# Patient Record
Sex: MALE | Race: WHITE | NOT HISPANIC OR LATINO | Employment: OTHER | URBAN - METROPOLITAN AREA
[De-identification: names, ages, dates, MRNs, and addresses within clinical notes are randomized per-mention and may not be internally consistent; named-entity substitution may affect disease eponyms.]

---

## 2017-02-09 ENCOUNTER — HOSPITAL ENCOUNTER (OUTPATIENT)
Dept: RADIOLOGY | Facility: MEDICAL CENTER | Age: 82
End: 2017-02-09
Attending: INTERNAL MEDICINE
Payer: MEDICARE

## 2017-02-09 DIAGNOSIS — M51.36 DEGENERATION OF LUMBAR INTERVERTEBRAL DISC: ICD-10-CM

## 2017-02-09 PROCEDURE — 72110 X-RAY EXAM L-2 SPINE 4/>VWS: CPT

## 2017-05-04 ENCOUNTER — HOSPITAL ENCOUNTER (OUTPATIENT)
Dept: HOSPITAL 8 - CFH | Age: 82
Discharge: HOME | End: 2017-05-04
Attending: NURSE PRACTITIONER
Payer: MEDICARE

## 2017-05-04 DIAGNOSIS — M25.561: Primary | ICD-10-CM

## 2017-05-04 DIAGNOSIS — M25.562: ICD-10-CM

## 2018-06-18 ENCOUNTER — HOSPITAL ENCOUNTER (OUTPATIENT)
Dept: HOSPITAL 8 - CFH | Age: 83
Discharge: HOME | End: 2018-06-18
Attending: INTERNAL MEDICINE
Payer: COMMERCIAL

## 2018-06-18 DIAGNOSIS — G31.89: Primary | ICD-10-CM

## 2018-06-18 PROCEDURE — 70450 CT HEAD/BRAIN W/O DYE: CPT

## 2019-08-27 ENCOUNTER — APPOINTMENT (RX ONLY)
Dept: URBAN - METROPOLITAN AREA CLINIC 4 | Facility: CLINIC | Age: 84
Setting detail: DERMATOLOGY
End: 2019-08-27

## 2019-08-27 DIAGNOSIS — L81.4 OTHER MELANIN HYPERPIGMENTATION: ICD-10-CM

## 2019-08-27 DIAGNOSIS — D22 MELANOCYTIC NEVI: ICD-10-CM

## 2019-08-27 DIAGNOSIS — L82.1 OTHER SEBORRHEIC KERATOSIS: ICD-10-CM

## 2019-08-27 DIAGNOSIS — D18.0 HEMANGIOMA: ICD-10-CM

## 2019-08-27 DIAGNOSIS — D485 NEOPLASM OF UNCERTAIN BEHAVIOR OF SKIN: ICD-10-CM

## 2019-08-27 DIAGNOSIS — L57.0 ACTINIC KERATOSIS: ICD-10-CM

## 2019-08-27 PROBLEM — D22.72 MELANOCYTIC NEVI OF LEFT LOWER LIMB, INCLUDING HIP: Status: ACTIVE | Noted: 2019-08-27

## 2019-08-27 PROBLEM — D22.62 MELANOCYTIC NEVI OF LEFT UPPER LIMB, INCLUDING SHOULDER: Status: ACTIVE | Noted: 2019-08-27

## 2019-08-27 PROBLEM — D48.5 NEOPLASM OF UNCERTAIN BEHAVIOR OF SKIN: Status: ACTIVE | Noted: 2019-08-27

## 2019-08-27 PROBLEM — D22.71 MELANOCYTIC NEVI OF RIGHT LOWER LIMB, INCLUDING HIP: Status: ACTIVE | Noted: 2019-08-27

## 2019-08-27 PROBLEM — D18.01 HEMANGIOMA OF SKIN AND SUBCUTANEOUS TISSUE: Status: ACTIVE | Noted: 2019-08-27

## 2019-08-27 PROBLEM — D22.5 MELANOCYTIC NEVI OF TRUNK: Status: ACTIVE | Noted: 2019-08-27

## 2019-08-27 PROBLEM — D22.61 MELANOCYTIC NEVI OF RIGHT UPPER LIMB, INCLUDING SHOULDER: Status: ACTIVE | Noted: 2019-08-27

## 2019-08-27 PROCEDURE — 11102 TANGNTL BX SKIN SINGLE LES: CPT

## 2019-08-27 PROCEDURE — 11103 TANGNTL BX SKIN EA SEP/ADDL: CPT

## 2019-08-27 PROCEDURE — ? COUNSELING

## 2019-08-27 PROCEDURE — 99213 OFFICE O/P EST LOW 20 MIN: CPT | Mod: 25

## 2019-08-27 PROCEDURE — ? BIOPSY BY SHAVE METHOD

## 2019-08-27 PROCEDURE — 17000 DESTRUCT PREMALG LESION: CPT | Mod: 59

## 2019-08-27 PROCEDURE — 17003 DESTRUCT PREMALG LES 2-14: CPT

## 2019-08-27 PROCEDURE — ? LIQUID NITROGEN

## 2019-08-27 PROCEDURE — ? SUNSCREEN RECOMMENDATIONS

## 2019-08-27 ASSESSMENT — LOCATION SIMPLE DESCRIPTION DERM
LOCATION SIMPLE: LEFT UPPER BACK
LOCATION SIMPLE: LEFT KNEE
LOCATION SIMPLE: RIGHT SCALP
LOCATION SIMPLE: RIGHT HAND
LOCATION SIMPLE: RIGHT LOWER BACK
LOCATION SIMPLE: RIGHT CHEEK
LOCATION SIMPLE: LEFT ZYGOMA
LOCATION SIMPLE: LEFT CHEEK
LOCATION SIMPLE: LEFT THIGH
LOCATION SIMPLE: LEFT FOREARM
LOCATION SIMPLE: UPPER BACK
LOCATION SIMPLE: SCALP
LOCATION SIMPLE: LEFT HAND
LOCATION SIMPLE: RIGHT FOREHEAD
LOCATION SIMPLE: RIGHT FOREARM
LOCATION SIMPLE: ABDOMEN
LOCATION SIMPLE: RIGHT THIGH
LOCATION SIMPLE: LEFT POSTERIOR UPPER ARM
LOCATION SIMPLE: LEFT FOREHEAD
LOCATION SIMPLE: LEFT ANTERIOR NECK
LOCATION SIMPLE: RIGHT POSTERIOR UPPER ARM

## 2019-08-27 ASSESSMENT — LOCATION ZONE DERM
LOCATION ZONE: LEG
LOCATION ZONE: HAND
LOCATION ZONE: ARM
LOCATION ZONE: FACE
LOCATION ZONE: NECK
LOCATION ZONE: SCALP
LOCATION ZONE: TRUNK

## 2019-08-27 ASSESSMENT — LOCATION DETAILED DESCRIPTION DERM
LOCATION DETAILED: LEFT INFERIOR FOREHEAD
LOCATION DETAILED: LEFT PROXIMAL DORSAL FOREARM
LOCATION DETAILED: RIGHT ANTERIOR PROXIMAL THIGH
LOCATION DETAILED: RIGHT DISTAL POSTERIOR UPPER ARM
LOCATION DETAILED: 2ND WEB SPACE LEFT HAND
LOCATION DETAILED: RIGHT INFERIOR MEDIAL MIDBACK
LOCATION DETAILED: LEFT ULNAR DORSAL HAND
LOCATION DETAILED: RIGHT CENTRAL MALAR CHEEK
LOCATION DETAILED: LEFT ANTERIOR PROXIMAL THIGH
LOCATION DETAILED: LEFT CENTRAL MALAR CHEEK
LOCATION DETAILED: RIGHT CENTRAL FRONTAL SCALP
LOCATION DETAILED: LEFT PROXIMAL POSTERIOR UPPER ARM
LOCATION DETAILED: RIGHT SUPERIOR MEDIAL MIDBACK
LOCATION DETAILED: PERIUMBILICAL SKIN
LOCATION DETAILED: LEFT CENTRAL ZYGOMA
LOCATION DETAILED: LEFT INFERIOR UPPER BACK
LOCATION DETAILED: SUPERIOR THORACIC SPINE
LOCATION DETAILED: LEFT KNEE
LOCATION DETAILED: LEFT SUPERIOR MEDIAL UPPER BACK
LOCATION DETAILED: LEFT CENTRAL PARIETAL SCALP
LOCATION DETAILED: RIGHT SUPERIOR MEDIAL FOREHEAD
LOCATION DETAILED: RIGHT RIB CAGE
LOCATION DETAILED: LEFT INFERIOR ANTERIOR NECK
LOCATION DETAILED: LEFT SUPERIOR CENTRAL MALAR CHEEK
LOCATION DETAILED: LEFT POSTERIOR LATERAL DISTAL THIGH
LOCATION DETAILED: RIGHT RADIAL DORSAL HAND
LOCATION DETAILED: RIGHT PROXIMAL DORSAL FOREARM

## 2019-08-27 NOTE — HPI: FULL BODY SKIN EXAMINATION
How Severe Are Your Spot(S)?: moderate
What Type Of Note Output Would You Prefer (Optional)?: Bullet Format
What Is The Reason For Today's Visit?: Skin Lesions
What Is The Reason For Today's Visit? (Being Monitored For X): the development of new lesions
Additional History: SAUMYA, has “warts” on his left knee and thigh he wants removed.

## 2019-08-27 NOTE — PROCEDURE: BIOPSY BY SHAVE METHOD
Silver Nitrate Text: The wound bed was treated with silver nitrate after the biopsy was performed.
Hemostasis: Drysol
Was A Bandage Applied: Yes
Bill 86174 For Specimen Handling/Conveyance To Laboratory?: no
Dressing: bandage
Electrodesiccation And Curettage Text: The wound bed was treated with electrodesiccation and curettage after the biopsy was performed.
Anesthesia Volume In Cc: 2
Biopsy Type: H and E
X Size Of Lesion In Cm: 0.2
Type Of Destruction Used: Curettage
Consent: Written consent was obtained and risks were reviewed including but not limited to scarring, infection, bleeding, scabbing, incomplete removal, nerve damage and allergy to anesthesia.
Electrodesiccation Text: The wound bed was treated with electrodesiccation after the biopsy was performed.
Depth Of Biopsy: dermis
Notification Instructions: Patient will be notified of biopsy results. However, patient instructed to call the office if not contacted within 2 weeks.
Lab Facility: 
Wound Care: Vaseline
Additional Anesthesia Volume In Cc (Will Not Render If 0): 0
Cryotherapy Text: The wound bed was treated with cryotherapy after the biopsy was performed.
Anesthesia Type: 1% lidocaine with epinephrine
Post-Care Instructions: I reviewed with the patient in detail post-care instructions. Patient is to keep the biopsy site dry overnight, and then apply bacitracin twice daily until healed. Patient may apply hydrogen peroxide soaks to remove any crusting.
Lab: [id number]
Curettage Text: The wound bed was treated with curettage after the biopsy was performed.
Detail Level: Detailed
Billing Type: Third-Party Bill
Biopsy Method: Personna blade
Size Of Lesion In Cm: 1.2
Wound Care: Bacitracin
X Size Of Lesion In Cm: 1.3
Size Of Lesion In Cm: 2.1

## 2019-08-27 NOTE — PROCEDURE: LIQUID NITROGEN
Render Post-Care Instructions In Note?: no
Number Of Freeze-Thaw Cycles: 2 freeze-thaw cycles
Detail Level: Simple
Consent: The patient's consent was obtained including but not limited to risks of crusting, scabbing, blistering, scarring, darker or lighter pigmentary change, recurrence, incomplete removal and infection.
Post-Care Instructions: I reviewed with the patient in detail post-care instructions. Patient is to wear sunprotection, and avoid picking at any of the treated lesions. Pt may apply Vaseline to crusted or scabbing areas.
Duration Of Freeze Thaw-Cycle (Seconds): 3

## 2019-09-05 ENCOUNTER — APPOINTMENT (RX ONLY)
Dept: URBAN - METROPOLITAN AREA CLINIC 4 | Facility: CLINIC | Age: 84
Setting detail: DERMATOLOGY
End: 2019-09-05

## 2019-09-05 PROBLEM — C44.91 BASAL CELL CARCINOMA OF SKIN, UNSPECIFIED: Status: ACTIVE | Noted: 2019-09-05

## 2019-09-05 PROCEDURE — ? MOHS SURGERY PHONE CONSULTATION

## 2019-09-05 NOTE — PROCEDURE: MOHS SURGERY PHONE CONSULTATION
Does The Patient Take Antibiotics Prior To Dental Procedures?: No
Office Location Of Mohs Surgery: [facility]
Detail Level: Simple
Patient Preferred Phone Number: [phone]
Has The Pathology Report Been Received?: Yes
Which Antibiotic Do They Take For Surgical Prophylaxis?: Amoxicillin (2 grams)
Time Of Mohs Surgery: 08:30
If Yes- Details?: MI-2/3/19
Referring Provider: pawel
Patient's Insurance: 
Patient Reported Location: right superior medial forehead
If Yes- What Blood Thinners Do They Take?: asa 81mg
Date Of Mohs Surgery: 09/30/2019
Pathology Accession #: [id number]

## 2019-09-30 ENCOUNTER — APPOINTMENT (RX ONLY)
Dept: URBAN - METROPOLITAN AREA CLINIC 36 | Facility: CLINIC | Age: 84
Setting detail: DERMATOLOGY
End: 2019-09-30

## 2019-09-30 PROBLEM — C44.319 BASAL CELL CARCINOMA OF SKIN OF OTHER PARTS OF FACE: Status: ACTIVE | Noted: 2019-09-30

## 2019-09-30 PROCEDURE — ? MOHS SURGERY

## 2019-09-30 PROCEDURE — 13132 CMPLX RPR F/C/C/M/N/AX/G/H/F: CPT

## 2019-09-30 PROCEDURE — 17311 MOHS 1 STAGE H/N/HF/G: CPT

## 2019-09-30 NOTE — PROCEDURE: MOHS SURGERY
Surgical Defect Length In Cm (Optional): 1.1
Stage 13: Additional Anesthesia Type: 1% lidocaine with epinephrine
Quadrants Reporting?: 0
Show Biopsy Photo Reviewed Variable: Yes
O-L Flap Text: The defect edges were debeveled with a #15 scalpel blade.  Given the location of the defect, shape of the defect and the proximity to free margins an O-L flap was deemed most appropriate.  Using a sterile surgical marker, an appropriate advancement flap was drawn incorporating the defect and placing the expected incisions within the relaxed skin tension lines where possible.    The area thus outlined was incised deep to adipose tissue with a #15 scalpel blade.  The skin margins were undermined to an appropriate distance in all directions utilizing iris scissors.
Donor Site Anesthesia Type: same as repair anesthesia
W Plasty Text: The lesion was extirpated to the level of the fat with a #15 scalpel blade.  Given the location of the defect, shape of the defect and the proximity to free margins a W-plasty was deemed most appropriate for repair.  Using a sterile surgical marker, the appropriate transposition arms of the W-plasty were drawn incorporating the defect and placing the expected incisions within the relaxed skin tension lines where possible.    The area thus outlined was incised deep to adipose tissue with a #15 scalpel blade.  The skin margins were undermined to an appropriate distance in all directions utilizing iris scissors.  The opposing transposition arms were then transposed into place in opposite direction and anchored with interrupted buried subcutaneous sutures.
Repair Type: Complex Repair
Consent 1/Introductory Paragraph: The rationale for Mohs was explained to the patient and consent was obtained. The risks, benefits and alternatives to therapy were discussed in detail. Specifically, the risks of infection, scarring, bleeding, prolonged wound healing, incomplete removal, allergy to anesthesia, nerve injury and recurrence were addressed. Prior to the procedure, the treatment site was clearly identified and confirmed by the patient. All components of Universal Protocol/PAUSE Rule completed.
Plastic Surgeon Procedure Text (A): After obtaining clear surgical margins the patient was sent to plastics for surgical repair.  The patient understands they will receive post-surgical care and follow-up from the referring physician's office.
Area H Indication Text: Tumors in this location are included in Area H (eyelids, eyebrows, nose, lips, chin, ear, pre-auricular, post-auricular, temple, genitalia, hands, feet, ankles and areola).  Tissue conservation is critical in these anatomic locations.
Use A Different Wound Care And Dressing When There Are No Sutures?: No
Non-Graft Cartilage Fenestration Text: The cartilage was fenestrated with a 2mm punch biopsy to help facilitate healing.
Banner Transposition Flap Text: The defect edges were debeveled with a #15 scalpel blade.  Given the location of the defect and the proximity to free margins a Banner transposition flap was deemed most appropriate.  Using a sterile surgical marker, an appropriate flap drawn around the defect. The area thus outlined was incised deep to adipose tissue with a #15 scalpel blade.  The skin margins were undermined to an appropriate distance in all directions utilizing iris scissors.
Xenograft Text: The defect edges were debeveled with a #15 scalpel blade.  Given the location of the defect, shape of the defect and the proximity to free margins a xenograft was deemed most appropriate.  The graft was then trimmed to fit the size of the defect.  The graft was then placed in the primary defect and oriented appropriately.
Closure 3 Information: This tab is for additional flaps and grafts above and beyond our usual structured repairs.  Please note if you enter information here it will not currently bill and you will need to add the billing information manually.
Mohs Method Verbiage: An incision at a 45 degree angle following the standard Mohs approach was done and the specimen was harvested as a microscopic controlled layer.
Advancement-Rotation Flap Text: The defect edges were debeveled with a #15 scalpel blade.  Given the location of the defect, shape of the defect and the proximity to free margins an advancement-rotation flap was deemed most appropriate.  Using a sterile surgical marker, an appropriate flap was drawn incorporating the defect and placing the expected incisions within the relaxed skin tension lines where possible. The area thus outlined was incised deep to adipose tissue with a #15 scalpel blade.  The skin margins were undermined to an appropriate distance in all directions utilizing iris scissors.
Interpolation Flap Text: A decision was made to reconstruct the defect utilizing an interpolation axial flap and a staged reconstruction.  A telfa template was made of the defect.  This telfa template was then used to outline the interpolation flap.  The donor area for the pedicle flap was then injected with anesthesia.  The flap was excised through the skin and subcutaneous tissue down to the layer of the underlying musculature.  The interpolation flap was carefully excised within this deep plane to maintain its blood supply.  The edges of the donor site were undermined.   The donor site was closed in a primary fashion.  The pedicle was then rotated into position and sutured.  Once the tube was sutured into place, adequate blood supply was confirmed with blanching and refill.  The pedicle was then wrapped with xeroform gauze and dressed appropriately with a telfa and gauze bandage to ensure continued blood supply and protect the attached pedicle.
Oculoplastic Surgeon (A): Lamberto
Complex Repair And Flap Additional Text (Will Appearing After The Standard Complex Repair Text): The complex repair was not sufficient to completely close the primary defect. The remaining additional defect was repaired with the flap mentioned below.
Consent (Marginal Mandibular)/Introductory Paragraph: The rationale for Mohs was explained to the patient and consent was obtained. The risks, benefits and alternatives to therapy were discussed in detail. Specifically, the risks of damage to the marginal mandibular branch of the facial nerve, infection, scarring, bleeding, prolonged wound healing, incomplete removal, allergy to anesthesia, and recurrence were addressed. Prior to the procedure, the treatment site was clearly identified and confirmed by the patient. All components of Universal Protocol/PAUSE Rule completed.
Asc Procedure Text (A): After obtaining clear surgical margins the patient was sent to an ASC for surgical repair.  The patient understands they will receive post-surgical care and follow-up from the ASC physician.
Referred To Oculoplastics For Closure Text (Leave Blank If You Do Not Want): After obtaining clear surgical margins the patient was sent to oculoplastics for surgical repair.  The patient understands they will receive post-surgical care and follow-up from the referring physician's office.
Referring Physician (Optional): AMBROSE Garcia
Dorsal Nasal Flap Text: The defect edges were debeveled with a #15 scalpel blade.  Given the location of the defect and the proximity to free margins a dorsal nasal flap,based upon the glabellar folds, was deemed most appropriate.  Using a sterile surgical marker, an appropriate dorsal nasal flap was drawn around the defect.    The area thus outlined was incised deep to adipose tissue with a #15 scalpel blade.  The skin margins were undermined to an appropriate distance in all directions utilizing iris scissors.
Otolaryngologist Procedure Text (B): After obtaining clear surgical margins the patient was sent to otolaryngology for surgical repair.  The patient understands they will receive post-surgical care and follow-up from the referring physician's office.
Partial Purse String (Intermediate) Text: Given the location of the defect and the characteristics of the surrounding skin an intermediate purse string closure was deemed most appropriate.  Undermining was performed circumfirentially around the surgical defect.  A purse string suture was then placed and tightened. Wound tension only allowed a partial closure of the circular defect.
Hatchet Flap Text: The defect edges were debeveled with a #15 scalpel blade.  Given the location of the defect, shape of the defect and the proximity to free margins a hatchet flap based from the glabella was deemed most appropriate.  Using a sterile surgical marker, an appropriate glabellar hatchet flap was drawn incorporating the defect and placing the expected incisions within the relaxed skin tension lines where possible.    The area thus outlined was incised deep to adipose tissue with a #15 scalpel blade.  The skin margins were undermined to an appropriate distance in all directions utilizing iris scissors.
Paramedian Forehead Flap Text: A decision was made to reconstruct the defect utilizing an interpolation axial flap and a staged reconstruction.  A telfa template was made of the defect.  This telfa template was then used to outline the paramedian forehead pedicle flap.  The donor area for the pedicle flap was then injected with anesthesia.  The flap was excised through the skin and subcutaneous tissue down to the layer of the underlying musculature.  The pedicle flap was carefully excised within this deep plane to maintain its blood supply.  The edges of the donor site were undermined.   The donor site was closed in a primary fashion.  The pedicle was then rotated into position and sutured.  Once the tube was sutured into place, adequate blood supply was confirmed with blanching and refill.  The pedicle was then wrapped with xeroform gauze and dressed appropriately with a telfa and gauze bandage to ensure continued blood supply and protect the attached pedicle.
Unique Flap 3 Name: Mercedes Flap
Consent (Nose)/Introductory Paragraph: The rationale for Mohs was explained to the patient and consent was obtained. The risks, benefits and alternatives to therapy were discussed in detail. Specifically, the risks of nasal deformity, changes in the flow of air through the nose, infection, scarring, bleeding, prolonged wound healing, incomplete removal, allergy to anesthesia, nerve injury and recurrence were addressed. Prior to the procedure, the treatment site was clearly identified and confirmed by the patient. All components of Universal Protocol/PAUSE Rule completed.
Cheiloplasty (Less Than 50%) Text: A decision was made to reconstruct the defect with a  cheiloplasty.  The defect was undermined extensively.  Additional obicularis oris muscle was excised with a 15 blade scalpel.  The defect was converted into a full thickness wedge, of less than 50% of the vertical height of the lip, to facilite a better cosmetic result.  Small vessels were then tied off with 5-0 monocyrl. The obicularis oris, superficial fascia, adipose and dermis were then reapproximated.  After the deeper layers were approximated the epidermis was reapproximated with particular care given to realign the vermilion border.
Subsequent Stages Histo Method Verbiage: Using a similar technique to that described above, a thin layer of tissue was removed from all areas where tumor was visible on the previous stage.  The tissue was again oriented, mapped, dyed, and processed as above.
Unique Flap 4 Name: Banner Flap
Consent (Lip)/Introductory Paragraph: The rationale for Mohs was explained to the patient and consent was obtained. The risks, benefits and alternatives to therapy were discussed in detail. Specifically, the risks of lip deformity, changes in the oral aperture, infection, scarring, bleeding, prolonged wound healing, incomplete removal, allergy to anesthesia, nerve injury and recurrence were addressed. Prior to the procedure, the treatment site was clearly identified and confirmed by the patient. All components of Universal Protocol/PAUSE Rule completed.
Island Pedicle Flap-Requiring Vessel Identification Text: The defect edges were debeveled with a #15 scalpel blade.  Given the location of the defect, shape of the defect and the proximity to free margins an island pedicle advancement flap was deemed most appropriate.  Using a sterile surgical marker, an appropriate advancement flap was drawn, based on the axial vessel mentioned above, incorporating the defect, outlining the appropriate donor tissue and placing the expected incisions within the relaxed skin tension lines where possible.    The area thus outlined was incised deep to adipose tissue with a #15 scalpel blade.  The skin margins were undermined to an appropriate distance in all directions around the primary defect and laterally outward around the island pedicle utilizing iris scissors.  There was minimal undermining beneath the pedicle flap.
Epidermal Closure: running cuticular
Stage 3: Additional Anesthesia Type: 1% lidocaine with 1:100,000 epinephrine and 408mcg clindamycin/ml and a 1:10 solution of 8.4% sodium bicarbonate
Repair Performed By Another Provider Text (Leave Blank If You Do Not Want): After obtaining clear surgical margins the defect was repaired by another provider.
Graft Donor Site Epidermal Sutures (Optional): 5-0 Ethibond
Crescentic Complex Repair Preamble Text (Leave Blank If You Do Not Want): Extensive wide undermining was performed at least 2 cm in all directions.
Muscle Hinge Flap Text: The defect edges were debeveled with a #15 scalpel blade.  Given the size, depth and location of the defect and the proximity to free margins a muscle hinge flap was deemed most appropriate.  Using a sterile surgical marker, an appropriate hinge flap was drawn incorporating the defect. The area thus outlined was incised with a #15 scalpel blade.  The skin margins were undermined to an appropriate distance in all directions utilizing iris scissors.
Closure 2 Information: This tab is for additional flaps and grafts, including complex repair and grafts and complex repair and flaps. You can also specify a different location for the additional defect, if the location is the same you do not need to select a new one. We will insert the automated text for the repair you select below just as we do for solitary flaps and grafts. Please note that at this time if you select a location with a different insurance zone you will need to override the ICD10 and CPT if appropriate.
Ftsg Text: The defect edges were debeveled with a #15 scalpel blade.  Given the location of the defect, shape of the defect and the proximity to free margins a full thickness skin graft was deemed most appropriate.  Using a sterile surgical marker, the primary defect shape was transferred to the donor site. The area thus outlined was incised deep to adipose tissue with a #15 scalpel blade.  The harvested graft was then trimmed of adipose tissue until only dermis and epidermis was left.  The skin margins of the secondary defect were undermined to an appropriate distance in all directions utilizing iris scissors.  The secondary defect was closed with interrupted buried subcutaneous sutures.  The skin edges were then re-apposed with running  sutures.  The skin graft was then placed in the primary defect and oriented appropriately.
Unique Flap 4 Text: The defect edges were debeveled with a #15 scalpel blade.  Given the location of the defect and the proximity to free margins a Banner transposition flap was deemed most appropriate.  Using a sterile surgical marker, an appropriate Banner transposition flap was drawn incorporating the defect.    The area thus outlined was incised deep to adipose tissue with a #15 scalpel blade.  The skin margins were undermined to an appropriate distance in all directions utilizing iris scissors.
Chonodrocutaneous Helical Advancement Flap Text: The defect edges were debeveled with a #15 scalpel blade.  Given the location of the defect and the proximity to free margins a chondrocutaneous helical advancement flap was deemed most appropriate.  Using a sterile surgical marker, the appropriate advancement flap was drawn incorporating the defect and placing the expected incisions within the relaxed skin tension lines where possible.    The area thus outlined was incised deep to adipose tissue with a #15 scalpel blade.  The skin margins were undermined to an appropriate distance in all directions utilizing iris scissors.
Graft Donor Site Bandage (Optional-Leave Blank If You Don't Want In Note): Aquaphor and telefa placed on wound. Pressure dressing applied to donor site
X Size Of Lesion In Cm (Optional): 0.5
Double O-Z Plasty Text: The defect edges were debeveled with a #15 scalpel blade.  Given the location of the defect, shape of the defect and the proximity to free margins a Double O-Z plasty (double transposition flap) was deemed most appropriate.  Using a sterile surgical marker, the appropriate transposition flaps were drawn incorporating the defect and placing the expected incisions within the relaxed skin tension lines where possible. The area thus outlined was incised deep to adipose tissue with a #15 scalpel blade.  The skin margins were undermined to an appropriate distance in all directions utilizing iris scissors.  Hemostasis was achieved with electrocautery.  The flaps were then transposed into place, one clockwise and the other counterclockwise, and anchored with interrupted buried subcutaneous sutures.
Additional Anesthesia Volume In Cc: 6
Wound Care: Aquaphor
Crescentic Intermediate Repair Preamble Text (Leave Blank If You Do Not Want): Undermining was performed with blunt dissection.
Bi-Rhombic Flap Text: The defect edges were debeveled with a #15 scalpel blade.  Given the location of the defect and the proximity to free margins a bi-rhombic flap was deemed most appropriate.  Using a sterile surgical marker, an appropriate rhombic flap was drawn incorporating the defect. The area thus outlined was incised deep to adipose tissue with a #15 scalpel blade.  The skin margins were undermined to an appropriate distance in all directions utilizing iris scissors.
Epidermal Autograft Text: The defect edges were debeveled with a #15 scalpel blade.  Given the location of the defect, shape of the defect and the proximity to free margins an epidermal autograft was deemed most appropriate.  Using a sterile surgical marker, the primary defect shape was transferred to the donor site. The epidermal graft was then harvested.  The skin graft was then placed in the primary defect and oriented appropriately.
Information: Selecting Yes will display possible errors in your note based on the variables you have selected. This validation is only offered as a suggestion for you. PLEASE NOTE THAT THE VALIDATION TEXT WILL BE REMOVED WHEN YOU FINALIZE YOUR NOTE. IF YOU WANT TO FAX A PRELIMINARY NOTE YOU WILL NEED TO TOGGLE THIS TO 'NO' IF YOU DO NOT WANT IT IN YOUR FAXED NOTE.
Mid-Level Procedure Text (D): After obtaining clear surgical margins the patient was sent to a mid-level provider for surgical repair.  The patient understands they will receive post-surgical care and follow-up from the mid-level provider.
Same Histology In Subsequent Stages Text: The pattern and morphology of the tumor is as described in the first stage.
Simple / Intermediate / Complex Repair - Final Wound Length In Cm: 3
Surgical Defect Width In Cm (Optional): 0.8
O-Z Flap Text: The defect edges were debeveled with a #15 scalpel blade.  Given the location of the defect, shape of the defect and the proximity to free margins an O-Z flap was deemed most appropriate.  Using a sterile surgical marker, an appropriate transposition flap was drawn incorporating the defect and placing the expected incisions within the relaxed skin tension lines where possible. The area thus outlined was incised deep to adipose tissue with a #15 scalpel blade.  The skin margins were undermined to an appropriate distance in all directions utilizing iris scissors.
Consent 2/Introductory Paragraph: Mohs surgery was explained to the patient and consent was obtained. The risks, benefits and alternatives to therapy were discussed in detail. Specifically, the risks of infection, scarring, bleeding, prolonged wound healing, incomplete removal, allergy to anesthesia, nerve injury and recurrence were addressed. Prior to the procedure, the treatment site was clearly identified and confirmed by the patient. All components of Universal Protocol/PAUSE Rule completed.
Z Plasty Text: The lesion was extirpated to the level of the fat with a #15 scalpel blade.  Given the location of the defect, shape of the defect and the proximity to free margins a Z-plasty was deemed most appropriate for repair.  Using a sterile surgical marker, the appropriate transposition arms of the Z-plasty were drawn incorporating the defect and placing the expected incisions within the relaxed skin tension lines where possible.    The area thus outlined was incised deep to adipose tissue with a #15 scalpel blade.  The skin margins were undermined to an appropriate distance in all directions utilizing iris scissors.  The opposing transposition arms were then transposed into place in opposite direction and anchored with interrupted buried subcutaneous sutures.
Wound Care (No Sutures): Petrolatum
Area M Indication Text: Tumors in this location are included in Area M (cheek, forehead, scalp, neck, jawline and pretibial skin).  Mohs surgery is indicated for tumors in these anatomic locations.
Graft Cartilage Fenestration Text: The cartilage was fenestrated with a 2mm punch biopsy to help facilitate graft survival and healing.
Previous Accession (Optional): [id number]
Bilobed Flap Text: The defect edges were debeveled with a #15 scalpel blade.  Given the location of the defect and the proximity to free margins a bilobe flap was deemed most appropriate.  Using a sterile surgical marker, an appropriate bilobe flap drawn around the defect.    The area thus outlined was incised deep to adipose tissue with a #15 scalpel blade.  The skin margins were undermined to an appropriate distance in all directions utilizing iris scissors.
Medical Necessity Statement: Based on my medical judgement, Mohs surgery is the most appropriate treatment for this cancer compared to other treatments.
Purse String (Simple) Text: Given the location of the defect and the characteristics of the surrounding skin a purse string closure was deemed most appropriate.  Undermining was performed circumfirentially around the surgical defect.  A purse string suture was then placed and tightened.
Pain Refusal Text: I offered to prescribe pain medication but the patient refused to take this medication.
Surgeon/Pathologist Verbiage (Will Incorporate Name Of Surgeon From Intro If Not Blank): operated in two distinct and integrated capacities as the surgeon and pathologist.
Mercedes Flap Text: The defect edges were debeveled with a #15 scalpel blade.  Given the location of the defect, shape of the defect and the proximity to free margins a Mercedes flap was deemed most appropriate.  Using a sterile surgical marker, an appropriate advancement flap was drawn incorporating the defect and placing the expected incisions within the relaxed skin tension lines where possible. The area thus outlined was incised deep to adipose tissue with a #15 scalpel blade.  The skin margins were undermined to an appropriate distance in all directions utilizing iris scissors.
Melolabial Interpolation Flap Text: A decision was made to reconstruct the defect utilizing an interpolation axial flap and a staged reconstruction.  A telfa template was made of the defect.  This telfa template was then used to outline the melolabial interpolation flap.  The donor area for the pedicle flap was then injected with anesthesia.  The flap was excised through the skin and subcutaneous tissue down to the layer of the underlying musculature.  The pedicle flap was carefully excised within this deep plane to maintain its blood supply.  The edges of the donor site were undermined.   The donor site was closed in a primary fashion.  The pedicle was then rotated into position and sutured.  Once the tube was sutured into place, adequate blood supply was confirmed with blanching and refill.  The pedicle was then wrapped with xeroform gauze and dressed appropriately with a telfa and gauze bandage to ensure continued blood supply and protect the attached pedicle.
Complex Repair And Graft Additional Text (Will Appearing After The Standard Complex Repair Text): The complex repair was not sufficient to completely close the primary defect. The remaining additional defect was repaired with the graft mentioned below.
Consent (Spinal Accessory)/Introductory Paragraph: The rationale for Mohs was explained to the patient and consent was obtained. The risks, benefits and alternatives to therapy were discussed in detail. Specifically, the risks of damage to the spinal accessory nerve, infection, scarring, bleeding, prolonged wound healing, incomplete removal, allergy to anesthesia, and recurrence were addressed. Prior to the procedure, the treatment site was clearly identified and confirmed by the patient. All components of Universal Protocol/PAUSE Rule completed.
Suture Removal: 7 days
Consent Type: Consent 1 (Standard)
Island Pedicle Flap Text: The defect edges were debeveled with a #15 scalpel blade.  Given the location of the defect, shape of the defect and the proximity to free margins an island pedicle advancement flap was deemed most appropriate.  Using a sterile surgical marker, an appropriate advancement flap was drawn incorporating the defect, outlining the appropriate donor tissue and placing the expected incisions within the relaxed skin tension lines where possible.    The area thus outlined was incised deep to adipose tissue with a #15 scalpel blade.  The skin margins were undermined to an appropriate distance in all directions around the primary defect and laterally outward around the island pedicle utilizing iris scissors.  There was minimal undermining beneath the pedicle flap.
Localized Dermabrasion With Wire Brush Text: The patient was draped in routine manner.  Localized dermabrasion using 3 x 17 mm wire brush was performed in routine manner to papillary dermis. This spot dermabrasion is being performed to complete skin cancer reconstruction. It also will eliminate the other sun damaged precancerous cells that are known to be part of the regional effect of a lifetime's worth of sun exposure. This localized dermabrasion is therapeutic and should not be considered cosmetic in any regard.
Bcc Histology Text: There were numerous aggregates of basaloid cells.
Rotation Flap Text: The defect edges were debeveled with a #15 scalpel blade.  Given the location of the defect, shape of the defect and the proximity to free margins a rotation flap was deemed most appropriate.  Using a sterile surgical marker, an appropriate rotation flap was drawn incorporating the defect and placing the expected incisions within the relaxed skin tension lines where possible.    The area thus outlined was incised deep to adipose tissue with a #15 scalpel blade.  The skin margins were undermined to an appropriate distance in all directions utilizing iris scissors.
Location Indication Override (Is Already Calculated Based On Selected Body Location): Area M
Spiral Flap Text: The defect edges were debeveled with a #15 scalpel blade.  Given the location of the defect, shape of the defect and the proximity to free margins a spiral flap was deemed most appropriate.  Using a sterile surgical marker, an appropriate rotation flap was drawn incorporating the defect and placing the expected incisions within the relaxed skin tension lines where possible. The area thus outlined was incised deep to adipose tissue with a #15 scalpel blade.  The skin margins were undermined to an appropriate distance in all directions utilizing iris scissors.
Cheiloplasty (Complex) Text: A decision was made to reconstruct the defect with a  cheiloplasty.  The defect was undermined extensively.  Additional obicularis oris muscle was excised with a 15 blade scalpel.  The defect was converted into a full thickness wedge to facilite a better cosmetic result.  Small vessels were then tied off with 5-0 monocyrl. The obicularis oris, superficial fascia, adipose and dermis were then reapproximated.  After the deeper layers were approximated the epidermis was reapproximated with particular care given to realign the vermilion border.
Mohs Rapid Report Verbiage: The area of clinically evident tumor was marked with skin marking ink and appropriately hatched.  The initial incision was made following the Mohs approach through the skin.  The specimen was taken to the lab, divided into the necessary number of pieces, chromacoded and processed according to the Mohs protocol.  This was repeated in successive stages until a tumor free defect was achieved.
Consent (Scalp)/Introductory Paragraph: The rationale for Mohs was explained to the patient and consent was obtained. The risks, benefits and alternatives to therapy were discussed in detail. Specifically, the risks of changes in hair growth pattern secondary to repair, infection, scarring, bleeding, prolonged wound healing, incomplete removal, allergy to anesthesia, nerve injury and recurrence were addressed. Prior to the procedure, the treatment site was clearly identified and confirmed by the patient. All components of Universal Protocol/PAUSE Rule completed.
Unique Flap 1 Text: A decision was made to reconstruct the defect utilizing a myocutaneous Island pedicle Flap based on the levator labii superioris muscle.  A telfa template was made of the defect.  This telfa template was then used to outline the myocutaneous flap, based along the meilolabial fold.  The donor area for the pedicle flap was then injected with anesthesia.  The flap was excised through the skin and subcutaneous tissue down to the layer of the underlying musculature.  The myocutaneous flap was carefully excised within this deep plane to maintain its blood supply. Based on the muscle. The edges of the donor site were undermined.   The donor site was closed in a primary fashion to the point of transposition.  The pedicle was then transposed into position and sutured.  Once the flap was sutured into place, adequate blood supply was confirmed with blanching and refill.
Keystone Flap Text: The defect edges were debeveled with a #15 scalpel blade.  Given the location of the defect, shape of the defect a keystone flap was deemed most appropriate.  Using a sterile surgical marker, an appropriate keystone flap was drawn incorporating the defect, outlining the appropriate donor tissue and placing the expected incisions within the relaxed skin tension lines where possible. The area thus outlined was incised deep to adipose tissue with a #15 scalpel blade.  The skin margins were undermined to an appropriate distance in all directions around the primary defect and laterally outward around the flap utilizing iris scissors.
Advancement Flap (Single) Text: The defect edges were debeveled with a #15 scalpel blade.  Given the location of the defect and the proximity to free margins a single advancement flap was deemed most appropriate.  Using a sterile surgical marker, an appropriate advancement flap was drawn incorporating the defect and placing the expected incisions within the relaxed skin tension lines where possible.    The area thus outlined was incised deep to adipose tissue with a #15 scalpel blade.  The skin margins were undermined to an appropriate distance in all directions utilizing iris scissors.
Anesthesia Volume In Cc: 2.5
Melolabial Transposition Flap Text: The defect edges were debeveled with a #15 scalpel blade.  Given the location of the defect and the proximity to free margins a melolabial flap was deemed most appropriate.  Using a sterile surgical marker, an appropriate melolabial transposition flap was drawn incorporating the defect.    The area thus outlined was incised deep to adipose tissue with a #15 scalpel blade.  The skin margins were undermined to an appropriate distance in all directions utilizing iris scissors.
Split-Thickness Skin Graft Text: The defect edges were debeveled with a #15 scalpel blade.  Given the location of the defect, shape of the defect and the proximity to free margins a split thickness skin graft was deemed most appropriate.  Using a sterile surgical marker, the primary defect shape was transferred to the donor site. The split thickness graft was then harvested.  The skin graft was then placed in the primary defect and oriented appropriately.
Manual Repair Warning Statement: We plan on removing the manually selected variable below in favor of our much easier automatic structured text blocks found in the previous tab. We decided to do this to help make the flow better and give you the full power of structured data. Manual selection is never going to be ideal in our platform and I would encourage you to avoid using manual selection from this point on, especially since I will be sunsetting this feature. It is important that you do one of two things with the customized text below. First, you can save all of the text in a word file so you can have it for future reference. Second, transfer the text to the appropriate area in the Library tab. Lastly, if there is a flap or graft type which we do not have you need to let us know right away so I can add it in before the variable is hidden. No need to panic, we plan to give you roughly 6 months to make the change.
Retention Suture Bite Size: 3 mm
Crescentic Advancement Flap Text: The defect edges were debeveled with a #15 scalpel blade.  Given the location of the defect and the proximity to free margins a crescentic advancement flap was deemed most appropriate.  Using a sterile surgical marker, the appropriate advancement flap was drawn incorporating the defect and placing the expected incisions within the relaxed skin tension lines where possible.    The area thus outlined was incised deep to adipose tissue with a #15 scalpel blade.  The skin margins were undermined to an appropriate distance in all directions utilizing iris scissors.
S Plasty Text: Given the location and shape of the defect, and the orientation of relaxed skin tension lines, an S-plasty was deemed most appropriate for repair.  Using a sterile surgical marker, the appropriate outline of the S-plasty was drawn, incorporating the defect and placing the expected incisions within the relaxed skin tension lines where possible.  The area thus outlined was incised deep to adipose tissue with a #15 scalpel blade.  The skin margins were undermined to an appropriate distance in all directions utilizing iris scissors. The skin flaps were advanced over the defect.  The opposing margins were then approximated with interrupted buried subcutaneous sutures.
Number Of Stages: 1
Hemostasis: Electrocautery
Deep Sutures: 5-0 Vicryl
Helical Rim Advancement Flap Text: The defect edges were debeveled with a #15 blade scalpel.  Given the location of the defect and the proximity to free margins (helical rim) a double helical rim advancement flap was deemed most appropriate.  Using a sterile surgical marker, the appropriate advancement flaps were drawn incorporating the defect and placing the expected incisions between the helical rim and antihelix where possible.  The area thus outlined was incised through and through with a #15 scalpel blade.  With a skin hook and iris scissors, the flaps were gently and sharply undermined and freed up.
Dermal Autograft Text: The defect edges were debeveled with a #15 scalpel blade.  Given the location of the defect, shape of the defect and the proximity to free margins a dermal autograft was deemed most appropriate.  Using a sterile surgical marker, the primary defect shape was transferred to the donor site. The area thus outlined was incised deep to adipose tissue with a #15 scalpel blade.  The harvested graft was then trimmed of adipose and epidermal tissue until only dermis was left.  The skin graft was then placed in the primary defect and oriented appropriately.
Postop Diagnosis: same
Bilateral Helical Rim Advancement Flap Text: The defect edges were debeveled with a #15 blade scalpel.  Given the location of the defect and the proximity to free margins (helical rim) a bilateral helical rim advancement flap was deemed most appropriate.  Using a sterile surgical marker, the appropriate advancement flaps were drawn incorporating the defect and placing the expected incisions between the helical rim and antihelix where possible.  The area thus outlined was incised through and through with a #15 scalpel blade.  With a skin hook and iris scissors, the flaps were gently and sharply undermined and freed up.
Skin Substitute Text: The defect edges were debeveled with a #15 scalpel blade.  Given the location of the defect, shape of the defect and the proximity to free margins a skin substitute graft was deemed most appropriate.  The graft material was trimmed to fit the size of the defect. The graft was then placed in the primary defect and oriented appropriately.
No Residual Tumor Seen Histology Text: There were no malignant cells seen in the sections examined.
Double O-Z Flap Text: The defect edges were debeveled with a #15 scalpel blade.  Given the location of the defect, shape of the defect and the proximity to free margins a Double O-Z flap was deemed most appropriate.  Using a sterile surgical marker, an appropriate transposition flap was drawn incorporating the defect and placing the expected incisions within the relaxed skin tension lines where possible. The area thus outlined was incised deep to adipose tissue with a #15 scalpel blade.  The skin margins were undermined to an appropriate distance in all directions utilizing iris scissors.
Consent 3/Introductory Paragraph: I gave the patient a chance to ask questions they had about the procedure.  Following this I explained the Mohs procedure and consent was obtained. The risks, benefits and alternatives to therapy were discussed in detail. Specifically, the risks of infection, scarring, bleeding, prolonged wound healing, incomplete removal, allergy to anesthesia, nerve injury and recurrence were addressed. Prior to the procedure, the treatment site was clearly identified and confirmed by the patient. All components of Universal Protocol/PAUSE Rule completed.
Cheek Interpolation Flap Text: A decision was made to reconstruct the defect utilizing an interpolation axial flap and a staged reconstruction.  A telfa template was made of the defect.  This telfa template was then used to outline the Cheek Interpolation flap.  The donor area for the pedicle flap was then injected with anesthesia.  The flap was excised through the skin and subcutaneous tissue down to the layer of the underlying musculature.  The interpolation flap was carefully excised within this deep plane to maintain its blood supply.  The edges of the donor site were undermined.   The donor site was closed in a primary fashion.  The pedicle was then rotated into position and sutured.  Once the tube was sutured into place, adequate blood supply was confirmed with blanching and refill.  The pedicle was then wrapped with xeroform gauze and dressed appropriately with a telfa and gauze bandage to ensure continued blood supply and protect the attached pedicle.
Area L Indication Text: Tumors in this location are included in Area L (trunk and extremities).  Mohs surgery is indicated for larger tumors, or tumors with aggressive histologic features, in these anatomic locations.
Secondary Intention Text (Leave Blank If You Do Not Want): The defect will heal with secondary intention.
Bilobed Transposition Flap Text: The defect edges were debeveled with a #15 scalpel blade.  Given the location of the defect and the proximity to free margins a bilobed transposition flap was deemed most appropriate.  Using a sterile surgical marker, an appropriate bilobe flap drawn around the defect.    The area thus outlined was incised deep to adipose tissue with a #15 scalpel blade.  The skin margins were undermined to an appropriate distance in all directions utilizing iris scissors.
Purse String (Intermediate) Text: Given the location of the defect and the characteristics of the surrounding skin a purse string intermediate closure was deemed most appropriate.  Undermining was performed circumfirentially around the surgical defect.  A purse string suture was then placed and tightened.
Mauc Instructions: By selecting yes to the question below the MAUC number will be added into the note.  This will be calculated automatically based on the diagnosis chosen, the size entered, the body zone selected (H,M,L) and the specific indications you chose. You will also have the option to override the Mohs AUC if you disagree with the automatically calculated number and this option is found in the Case Summary tab.
Modified Advancement Flap Text: The defect edges were debeveled with a #15 scalpel blade.  Given the location of the defect, shape of the defect and the proximity to free margins a modified advancement flap was deemed most appropriate.  Using a sterile surgical marker, an appropriate advancement flap was drawn incorporating the defect and placing the expected incisions within the relaxed skin tension lines where possible.    The area thus outlined was incised deep to adipose tissue with a #15 scalpel blade.  The skin margins were undermined to an appropriate distance in all directions utilizing iris scissors.
Mastoid Interpolation Flap Text: A decision was made to reconstruct the defect utilizing an interpolation axial flap and a staged reconstruction.  A telfa template was made of the defect.  This telfa template was then used to outline the mastoid interpolation flap.  The donor area for the pedicle flap was then injected with anesthesia.  The flap was excised through the skin and subcutaneous tissue down to the layer of the underlying musculature.  The pedicle flap was carefully excised within this deep plane to maintain its blood supply.  The edges of the donor site were undermined.   The donor site was closed in a primary fashion.  The pedicle was then rotated into position and sutured.  Once the tube was sutured into place, adequate blood supply was confirmed with blanching and refill.  The pedicle was then wrapped with xeroform gauze and dressed appropriately with a telfa and gauze bandage to ensure continued blood supply and protect the attached pedicle.
Mohs Histo Method Verbiage: Each section was then chromacoded and processed in the Mohs lab using the Mohs protocol and submitted for frozen section.
Unique Flap 1 Name: Myocutaneous Island pedicle Flap
Consent (Near Eyelid Margin)/Introductory Paragraph: The rationale for Mohs was explained to the patient and consent was obtained. The risks, benefits and alternatives to therapy were discussed in detail. Specifically, the risks of ectropion or eyelid deformity, infection, scarring, bleeding, prolonged wound healing, incomplete removal, allergy to anesthesia, nerve injury and recurrence were addressed. Prior to the procedure, the treatment site was clearly identified and confirmed by the patient. All components of Universal Protocol/PAUSE Rule completed.
Island Pedicle Flap With Canthal Suspension Text: The defect edges were debeveled with a #15 scalpel blade.  Given the location of the defect, shape of the defect and the proximity to free margins an island pedicle advancement flap was deemed most appropriate.  Using a sterile surgical marker, an appropriate advancement flap was drawn incorporating the defect, outlining the appropriate donor tissue and placing the expected incisions within the relaxed skin tension lines where possible. The area thus outlined was incised deep to adipose tissue with a #15 scalpel blade.  The skin margins were undermined to an appropriate distance in all directions around the primary defect and laterally outward around the island pedicle utilizing iris scissors.  There was minimal undermining beneath the pedicle flap. A suspension suture was placed in the canthal tendon to prevent tension and prevent ectropion.
Tarsorrhaphy Text: A tarsorrhaphy was performed using Frost sutures.
Bcc Infiltrative Histology Text: There were numerous aggregates of basaloid cells demonstrating an infiltrative pattern.
Alar Island Pedicle Flap Text: The defect edges were debeveled with a #15 scalpel blade.  Given the location of the defect, shape of the defect and the proximity to the alar rim an island pedicle advancement flap was deemed most appropriate.  Using a sterile surgical marker, an appropriate advancement flap was drawn incorporating the defect, outlining the appropriate donor tissue and placing the expected incisions within the nasal ala running parallel to the alar rim. The area thus outlined was incised with a #15 scalpel blade.  The skin margins were undermined minimally to an appropriate distance in all directions around the primary defect and laterally outward around the island pedicle utilizing iris scissors.  There was minimal undermining beneath the pedicle flap.
Star Wedge Flap Text: The defect edges were debeveled with a #15 scalpel blade.  Given the location of the defect, shape of the defect and the proximity to free margins a star wedge flap was deemed most appropriate.  Using a sterile surgical marker, an appropriate rotation flap was drawn incorporating the defect and placing the expected incisions within the relaxed skin tension lines where possible. The area thus outlined was incised deep to adipose tissue with a #15 scalpel blade.  The skin margins were undermined to an appropriate distance in all directions utilizing iris scissors.
Ear Wedge Repair Text: A wedge excision was completed by carrying down an excision through the full thickness of the ear and cartilage with an inward facing Burow's triangle. The wound was then closed in a layered fashion.
Detail Level: Detailed
Unique Flap 2 Text: A decision was made to reconstruct the defect utilizing a Peng Flap (Bilateral Advancement Rotation Flap). Given the location of the defect and the proximity to free margins, this flap was deemed most appropriate.  Using a sterile surgical marker, the appropriate rotation flaps were drawn incorporating the defect and placing the expected incisions within the relaxed skin tension lines where possible.    The area thus outlined was incised deep to adipose tissue with a #15 scalpel blade.  The skin margins were undermined to an appropriate distance in all directions utilizing iris scissors.
Unna Boot Text: An Unna boot was placed to help immobilize the limb and facilitate more rapid healing.
Advancement Flap (Double) Text: The defect edges were debeveled with a #15 scalpel blade.  Given the location of the defect and the proximity to free margins a double advancement flap was deemed most appropriate.  Using a sterile surgical marker, the appropriate advancement flaps were drawn incorporating the defect and placing the expected incisions within the relaxed skin tension lines where possible.    The area thus outlined was incised deep to adipose tissue with a #15 scalpel blade.  The skin margins were undermined to an appropriate distance in all directions utilizing iris scissors.
O-T Plasty Text: The defect edges were debeveled with a #15 scalpel blade.  Given the location of the defect, shape of the defect and the proximity to free margins an O-T plasty was deemed most appropriate.  Using a sterile surgical marker, an appropriate O-T plasty was drawn incorporating the defect and placing the expected incisions within the relaxed skin tension lines where possible.    The area thus outlined was incised deep to adipose tissue with a #15 scalpel blade.  The skin margins were undermined to an appropriate distance in all directions utilizing iris scissors.
Epidermal Closure Graft Donor Site (Optional): simple interrupted
Surgeon Performing Repair (Optional): Melissa
M-Plasty Complex Repair Preamble Text (Leave Blank If You Do Not Want): Extensive wide undermining was performed.
Rhombic Flap Text: The defect edges were debeveled with a #15 scalpel blade.  Given the location of the defect and the proximity to free margins a rhombic flap was deemed most appropriate.  Using a sterile surgical marker, an appropriate rhombic flap was drawn incorporating the defect.    The area thus outlined was incised deep to adipose tissue with a #15 scalpel blade.  The skin margins were undermined to an appropriate distance in all directions utilizing iris scissors.
Cartilage Graft Text: The defect edges were debeveled with a #15 scalpel blade.  Given the location of the defect, shape of the defect, the fact the defect involved a full thickness cartilage defect a cartilage graft was deemed most appropriate.  An appropriate donor site was identified, cleansed, and anesthetized. The cartilage graft was then harvested and transferred to the recipient site, oriented appropriately and then sutured into place.  The secondary defect was then repaired using a primary closure.
Post-Care Instructions: I reviewed with the patient in detail post-care instructions. Patient is not to engage in any heavy lifting, exercise, or swimming for the next 14 days. Should the patient develop any fevers, chills, bleeding, severe pain patient will contact the office immediately.
Suturegard Intro: Intraoperative tissue expansion was performed, utilizing the SUTUREGARD device, in order to reduce wound tension.
A-T Advancement Flap Text: The defect edges were debeveled with a #15 scalpel blade.  Given the location of the defect, shape of the defect and the proximity to free margins an A-T advancement flap was deemed most appropriate.  Using a sterile surgical marker, an appropriate advancement flap was drawn incorporating the defect and placing the expected incisions within the relaxed skin tension lines where possible.    The area thus outlined was incised deep to adipose tissue with a #15 scalpel blade.  The skin margins were undermined to an appropriate distance in all directions utilizing iris scissors.
V-Y Plasty Text: The defect edges were debeveled with a #15 scalpel blade.  Given the location of the defect, shape of the defect and the proximity to free margins an V-Y advancement flap was deemed most appropriate.  Using a sterile surgical marker, an appropriate advancement flap was drawn incorporating the defect and placing the expected incisions within the relaxed skin tension lines where possible.    The area thus outlined was incised deep to adipose tissue with a #15 scalpel blade.  The skin margins were undermined to an appropriate distance in all directions utilizing iris scissors.
Dressing: dry sterile dressing
Estimated Blood Loss (Cc): less than 5 cc
Mohs Case Number: 
Ear Star Wedge Flap Text: The defect edges were debeveled with a #15 blade scalpel.  Given the location of the defect and the proximity to free margins (helical rim) an ear star wedge flap was deemed most appropriate.  Using a sterile surgical marker, the appropriate flap was drawn incorporating the defect and placing the expected incisions between the helical rim and antihelix where possible.  The area thus outlined was incised through and through with a #15 scalpel blade.
Tissue Cultured Epidermal Autograft Text: The defect edges were debeveled with a #15 scalpel blade.  Given the location of the defect, shape of the defect and the proximity to free margins a tissue cultured epidermal autograft was deemed most appropriate.  The graft was then trimmed to fit the size of the defect.  The graft was then placed in the primary defect and oriented appropriately.
Inflammation Suggestive Of Cancer Camouflage Histology Text: There was a dense lymphocytic infiltrate which prevented adequate histologic evaluation of adjacent structures.
Eye Protection Verbiage: Before proceeding with the stage, a plastic scleral shield was inserted. The globe was anesthetized with a few drops of 1% lidocaine with 1:100,000 epinephrine. Then, an appropriate sized scleral shield was chosen and coated with lacrilube ointment. The shield was gently inserted and left in place for the duration of each stage. After the stage was completed, the shield was gently removed.
V-Y Flap Text: The defect edges were debeveled with a #15 scalpel blade.  Given the location of the defect, shape of the defect and the proximity to free margins a V-Y flap was deemed most appropriate.  Using a sterile surgical marker, an appropriate advancement flap was drawn incorporating the defect and placing the expected incisions within the relaxed skin tension lines where possible.    The area thus outlined was incised deep to adipose tissue with a #15 scalpel blade.  The skin margins were undermined to an appropriate distance in all directions utilizing iris scissors.
Graft Basting Suture (Optional): 5-0 Fast Absorbing Gut
Cheek-To-Nose Interpolation Flap Text: A decision was made to reconstruct the defect utilizing an interpolation axial flap and a staged reconstruction.  A telfa template was made of the defect.  This telfa template was then used to outline the Cheek-To-Nose Interpolation flap.  The donor area for the pedicle flap was then injected with anesthesia.  The flap was excised through the skin and subcutaneous tissue down to the layer of the underlying musculature.  The interpolation flap was carefully excised within this deep plane to maintain its blood supply.  The edges of the donor site were undermined.   The donor site was closed in a primary fashion.  The pedicle was then rotated into position and sutured.  Once the tube was sutured into place, adequate blood supply was confirmed with blanching and refill.  The pedicle was then wrapped with xeroform gauze and dressed appropriately with a telfa and gauze bandage to ensure continued blood supply and protect the attached pedicle.
Consent (Temporal Branch)/Introductory Paragraph: The rationale for Mohs was explained to the patient and consent was obtained. The risks, benefits and alternatives to therapy were discussed in detail. Specifically, the risks of damage to the temporal branch of the facial nerve, infection, scarring, bleeding, prolonged wound healing, incomplete removal, allergy to anesthesia, and recurrence were addressed. Prior to the procedure, the treatment site was clearly identified and confirmed by the patient. All components of Universal Protocol/PAUSE Rule completed.
No Repair - Repaired With Adjacent Surgical Defect Text (Leave Blank If You Do Not Want): After obtaining clear surgical margins the defect was repaired concurrently with another surgical defect which was in close approximation.
Epidermal Sutures: 5-0 Ethilon
Trilobed Flap Text: The defect edges were debeveled with a #15 scalpel blade.  Given the location of the defect and the proximity to free margins a trilobed flap was deemed most appropriate.  Using a sterile surgical marker, an appropriate trilobed flap drawn around the defect.    The area thus outlined was incised deep to adipose tissue with a #15 scalpel blade.  The skin margins were undermined to an appropriate distance in all directions utilizing iris scissors.
Partial Purse String (Simple) Text: Given the location of the defect and the characteristics of the surrounding skin a simple purse string closure was deemed most appropriate.  Undermining was performed circumfirentially around the surgical defect.  A purse string suture was then placed and tightened. Wound tension only allowed a partial closure of the circular defect.
Mucosal Advancement Flap Text: Given the location of the defect, shape of the defect and the proximity to free margins a mucosal advancement flap was deemed most appropriate. Incisions were made with a 15 blade scalpel in the appropriate fashion along the cutaneous vermilion border and the mucosal lip. The remaining actinically damaged mucosal tissue was excised.  The mucosal advancement flap was then elevated to the gingival sulcus with care taken to preserve the neurovascular structures and advanced into the primary defect. Care was taken to ensure that precise realignment of the vermilion border was achieved.
Repair Anesthesia Method: local infiltration
Posterior Auricular Interpolation Flap Text: A decision was made to reconstruct the defect utilizing an interpolation axial flap and a staged reconstruction.  A telfa template was made of the defect.  This telfa template was then used to outline the posterior auricular interpolation flap.  The donor area for the pedicle flap was then injected with anesthesia.  The flap was excised through the skin and subcutaneous tissue down to the layer of the underlying musculature.  The pedicle flap was carefully excised within this deep plane to maintain its blood supply.  The edges of the donor site were undermined.   The donor site was closed in a primary fashion.  The pedicle was then rotated into position and sutured.  Once the tube was sutured into place, adequate blood supply was confirmed with blanching and refill.  The pedicle was then wrapped with xeroform gauze and dressed appropriately with a telfa and gauze bandage to ensure continued blood supply and protect the attached pedicle.
Unique Flap 2 Name: Peng Flap
Consent (Ear)/Introductory Paragraph: The rationale for Mohs was explained to the patient and consent was obtained. The risks, benefits and alternatives to therapy were discussed in detail. Specifically, the risks of ear deformity, infection, scarring, bleeding, prolonged wound healing, incomplete removal, allergy to anesthesia, nerve injury and recurrence were addressed. Prior to the procedure, the treatment site was clearly identified and confirmed by the patient. All components of Universal Protocol/PAUSE Rule completed.
Double Island Pedicle Flap Text: The defect edges were debeveled with a #15 scalpel blade.  Given the location of the defect, shape of the defect and the proximity to free margins a double island pedicle advancement flap was deemed most appropriate.  Using a sterile surgical marker, an appropriate advancement flap was drawn incorporating the defect, outlining the appropriate donor tissue and placing the expected incisions within the relaxed skin tension lines where possible.    The area thus outlined was incised deep to adipose tissue with a #15 scalpel blade.  The skin margins were undermined to an appropriate distance in all directions around the primary defect and laterally outward around the island pedicle utilizing iris scissors.  There was minimal undermining beneath the pedicle flap.
Anesthesia Type: 0.5% lidocaine with 1:200,000 epinephrine and a 1:10 solution of 8.4% sodium bicarbonate and 408mcg clindamycin/ml
Transposition Flap Text: The defect edges were debeveled with a #15 scalpel blade.  Given the location of the defect and the proximity to free margins a transposition flap was deemed most appropriate.  Using a sterile surgical marker, an appropriate transposition flap was drawn incorporating the defect.    The area thus outlined was incised deep to adipose tissue with a #15 scalpel blade.  The skin margins were undermined to an appropriate distance in all directions utilizing iris scissors.
Full Thickness Lip Wedge Repair (Flap) Text: Given the location of the defect and the proximity to free margins a full thickness wedge repair was deemed most appropriate.  Using a sterile surgical marker, the appropriate repair was drawn incorporating the defect and placing the expected incisions perpendicular to the vermilion border.  The vermilion border was also meticulously outlined to ensure appropriate reapproximation during the repair.  The area thus outlined was incised through and through with a #15 scalpel blade.  The muscularis and dermis were reaproximated with deep sutures following hemostasis. Care was taken to realign the vermilion border before proceeding with the superficial closure.  Once the vermilion was realigned the superfical and mucosal closure was finished.
Unique Flap 3 Text: The defect edges were debeveled with a #15 scalpel blade.  Given the location of the defect, shape of the defect and the proximity to free margins a Mercedes (double advancement flap) was deemed most appropriate.  Using a sterile surgical marker, the appropriate transposition flaps were drawn incorporating the defect and placing the expected incisions within the relaxed skin tension lines where possible.    The area thus outlined was incised deep to adipose tissue with a #15 scalpel blade.  The skin margins were undermined to an appropriate distance in all directions utilizing iris scissors.  Hemostasis was achieved with electrocautery.  The flaps were then advanced into the defect and anchored with interrupted buried subcutaneous sutures.
Home Suture Removal Text: Patient was provided instructions on removing sutures and will remove their sutures at home.  If they have any questions or difficulties they will call the office.
Suturegard Retention Suture: 2-0 Nylon
O-Z Plasty Text: The defect edges were debeveled with a #15 scalpel blade.  Given the location of the defect, shape of the defect and the proximity to free margins an O-Z plasty (double transposition flap) was deemed most appropriate.  Using a sterile surgical marker, the appropriate transposition flaps were drawn incorporating the defect and placing the expected incisions within the relaxed skin tension lines where possible.    The area thus outlined was incised deep to adipose tissue with a #15 scalpel blade.  The skin margins were undermined to an appropriate distance in all directions utilizing iris scissors.  Hemostasis was achieved with electrocautery.  The flaps were then transposed into place, one clockwise and the other counterclockwise, and anchored with interrupted buried subcutaneous sutures.
Burow's Advancement Flap Text: The defect edges were debeveled with a #15 scalpel blade.  Given the location of the defect and the proximity to free margins a Burow's advancement flap was deemed most appropriate.  Using a sterile surgical marker, the appropriate advancement flap was drawn incorporating the defect and placing the expected incisions within the relaxed skin tension lines where possible.    The area thus outlined was incised deep to adipose tissue with a #15 scalpel blade.  The skin margins were undermined to an appropriate distance in all directions utilizing iris scissors.
Rhomboid Transposition Flap Text: The defect edges were debeveled with a #15 scalpel blade.  Given the location of the defect and the proximity to free margins a rhomboid transposition flap was deemed most appropriate.  Using a sterile surgical marker, an appropriate rhomboid flap was drawn incorporating the defect.    The area thus outlined was incised deep to adipose tissue with a #15 scalpel blade.  The skin margins were undermined to an appropriate distance in all directions utilizing iris scissors.
Composite Graft Text: The defect edges were debeveled with a #15 scalpel blade.  Given the location of the defect, shape of the defect, the proximity to free margins and the fact the defect was full thickness a composite graft was deemed most appropriate.  The defect was outline and then transferred to the donor site.  A full thickness graft was then excised from the donor site. The graft was then placed in the primary defect, oriented appropriately and then sutured into place.  The secondary defect was then repaired using a primary closure.
Length To Time In Minutes Device Was In Place: 10
Suturegard Body: The suture ends were repeatedly re-tightened and re-clamped to achieve the desired tissue expansion.
O-T Advancement Flap Text: The defect edges were debeveled with a #15 scalpel blade.  Given the location of the defect, shape of the defect and the proximity to free margins an O-T advancement flap was deemed most appropriate.  Using a sterile surgical marker, an appropriate advancement flap was drawn incorporating the defect and placing the expected incisions within the relaxed skin tension lines where possible.    The area thus outlined was incised deep to adipose tissue with a #15 scalpel blade.  The skin margins were undermined to an appropriate distance in all directions utilizing iris scissors.
Alternatives Discussed Intro (Do Not Add Period): I discussed alternative treatments to Mohs surgery and specifically discussed the risks and benefits of
H Plasty Text: Given the location of the defect, shape of the defect and the proximity to free margins a H-plasty was deemed most appropriate for repair.  Using a sterile surgical marker, the appropriate advancement arms of the H-plasty were drawn incorporating the defect and placing the expected incisions within the relaxed skin tension lines where possible. The area thus outlined was incised deep to adipose tissue with a #15 scalpel blade. The skin margins were undermined to an appropriate distance in all directions utilizing iris scissors.  The opposing advancement arms were then advanced into place in opposite direction and anchored with interrupted buried subcutaneous sutures.

## 2019-10-07 ENCOUNTER — APPOINTMENT (RX ONLY)
Dept: URBAN - METROPOLITAN AREA CLINIC 36 | Facility: CLINIC | Age: 84
Setting detail: DERMATOLOGY
End: 2019-10-07

## 2019-10-07 DIAGNOSIS — Z48.02 ENCOUNTER FOR REMOVAL OF SUTURES: ICD-10-CM

## 2019-10-07 PROCEDURE — ? SUTURE REMOVAL (GLOBAL PERIOD)

## 2019-10-07 PROCEDURE — 99024 POSTOP FOLLOW-UP VISIT: CPT

## 2019-10-07 ASSESSMENT — LOCATION SIMPLE DESCRIPTION DERM: LOCATION SIMPLE: RIGHT FOREHEAD

## 2019-10-07 ASSESSMENT — LOCATION ZONE DERM: LOCATION ZONE: FACE

## 2019-10-07 ASSESSMENT — LOCATION DETAILED DESCRIPTION DERM: LOCATION DETAILED: RIGHT SUPERIOR MEDIAL FOREHEAD

## 2019-10-07 NOTE — PROCEDURE: SUTURE REMOVAL (GLOBAL PERIOD)
Detail Level: Detailed
Add 60513 Cpt? (Important Note: In 2017 The Use Of 56306 Is Being Tracked By Cms To Determine Future Global Period Reimbursement For Global Periods): yes

## 2019-10-14 ENCOUNTER — APPOINTMENT (RX ONLY)
Dept: URBAN - METROPOLITAN AREA CLINIC 36 | Facility: CLINIC | Age: 84
Setting detail: DERMATOLOGY
End: 2019-10-14

## 2019-10-14 PROBLEM — C44.519 BASAL CELL CARCINOMA OF SKIN OF OTHER PART OF TRUNK: Status: ACTIVE | Noted: 2019-10-14

## 2019-10-14 PROCEDURE — ? EXCISION

## 2019-10-14 PROCEDURE — 13101 CMPLX RPR TRUNK 2.6-7.5 CM: CPT

## 2019-10-14 PROCEDURE — 11603 EXC TR-EXT MAL+MARG 2.1-3 CM: CPT

## 2019-10-14 NOTE — PROCEDURE: EXCISION
Deep Sutures: 4-0 PDO
Billing Type: Third-Party Bill
Island Pedicle Flap Text: The defect edges were debeveled with a #15 scalpel blade.  Given the location of the defect, shape of the defect and the proximity to free margins an island pedicle advancement flap was deemed most appropriate.  Using a sterile surgical marker, an appropriate advancement flap was drawn incorporating the defect, outlining the appropriate donor tissue and placing the expected incisions within the relaxed skin tension lines where possible.    The area thus outlined was incised deep to adipose tissue with a #15 scalpel blade.  The skin margins were undermined to an appropriate distance in all directions around the primary defect and laterally outward around the island pedicle utilizing iris scissors.  There was minimal undermining beneath the pedicle flap.
Skin Substitute Units (Will Override Primary Defect Units If Greater Than 0): 0
Complex Repair And Xenograft Text: The defect edges were debeveled with a #15 scalpel blade.  The primary defect was closed partially with a complex linear closure.  Given the location of the defect, shape of the defect and the proximity to free margins a xenograft was deemed most appropriate to repair the remaining defect.  The graft was trimmed to fit the size of the remaining defect.  The graft was then placed in the primary defect, oriented appropriately, and sutured into place.
Curettage Prior To Excision?: Yes
Mucosal Advancement Flap Text: Given the location of the defect, shape of the defect and the proximity to free margins a mucosal advancement flap was deemed most appropriate. Incisions were made with a 15 blade scalpel in the appropriate fashion along the cutaneous vermilion border and the mucosal lip. The remaining actinically damaged mucosal tissue was excised.  The mucosal advancement flap was then elevated to the gingival sulcus with care taken to preserve the neurovascular structures and advanced into the primary defect. Care was taken to ensure that precise realignment of the vermilion border was achieved.
Complex Repair And A-T Advancement Flap Text: The defect edges were debeveled with a #15 scalpel blade.  The primary defect was closed partially with a complex linear closure.  Given the location of the remaining defect, shape of the defect and the proximity to free margins an A-T advancement flap was deemed most appropriate for complete closure of the defect.  Using a sterile surgical marker, an appropriate advancement flap was drawn incorporating the defect and placing the expected incisions within the relaxed skin tension lines where possible.    The area thus outlined was incised deep to adipose tissue with a #15 scalpel blade.  The skin margins were undermined to an appropriate distance in all directions utilizing iris scissors.
Posterior Auricular Interpolation Flap Text: A decision was made to reconstruct the defect utilizing an interpolation axial flap and a staged reconstruction.  A telfa template was made of the defect.  This telfa template was then used to outline the posterior auricular interpolation flap.  The donor area for the pedicle flap was then injected with anesthesia.  The flap was excised through the skin and subcutaneous tissue down to the layer of the underlying musculature.  The pedicle flap was carefully excised within this deep plane to maintain its blood supply.  The edges of the donor site were undermined.   The donor site was closed in a primary fashion.  The pedicle was then rotated into position and sutured.  Once the tube was sutured into place, adequate blood supply was confirmed with blanching and refill.  The pedicle was then wrapped with xeroform gauze and dressed appropriately with a telfa and gauze bandage to ensure continued blood supply and protect the attached pedicle.
Render Path Notes In Note?: no
Elliptical Excision Additional Text (Leave Blank If You Do Not Want): The margin was drawn around the clinically apparent lesion.  An elliptical shape was then drawn on the skin incorporating the lesion and margins.  Incisions were then made along these lines to the appropriate tissue plane and the lesion was extirpated.
Intermediate / Complex Repair - Final Wound Length In Cm: 6
Dressing: dry sterile dressing
Complex Repair And Tissue Cultured Epidermal Autograft Text: The defect edges were debeveled with a #15 scalpel blade.  The primary defect was closed partially with a complex linear closure.  Given the location of the defect, shape of the defect and the proximity to free margins an tissue cultured epidermal autograft was deemed most appropriate to repair the remaining defect.  The graft was trimmed to fit the size of the remaining defect.  The graft was then placed in the primary defect, oriented appropriately, and sutured into place.
Dorsal Nasal Flap Text: The defect edges were debeveled with a #15 scalpel blade.  Given the location of the defect and the proximity to free margins a dorsal nasal flap was deemed most appropriate.  Using a sterile surgical marker, an appropriate dorsal nasal flap was drawn around the defect.    The area thus outlined was incised deep to adipose tissue with a #15 scalpel blade.  The skin margins were undermined to an appropriate distance in all directions utilizing iris scissors.
Modified Advancement Flap Text: The defect edges were debeveled with a #15 scalpel blade.  Given the location of the defect, shape of the defect and the proximity to free margins a modified advancement flap was deemed most appropriate.  Using a sterile surgical marker, an appropriate advancement flap was drawn incorporating the defect and placing the expected incisions within the relaxed skin tension lines where possible.    The area thus outlined was incised deep to adipose tissue with a #15 scalpel blade.  The skin margins were undermined to an appropriate distance in all directions utilizing iris scissors.
Complex Repair And Modified Advancement Flap Text: The defect edges were debeveled with a #15 scalpel blade.  The primary defect was closed partially with a complex linear closure.  Given the location of the remaining defect, shape of the defect and the proximity to free margins a modified advancement flap was deemed most appropriate for complete closure of the defect.  Using a sterile surgical marker, an appropriate advancement flap was drawn incorporating the defect and placing the expected incisions within the relaxed skin tension lines where possible.    The area thus outlined was incised deep to adipose tissue with a #15 scalpel blade.  The skin margins were undermined to an appropriate distance in all directions utilizing iris scissors.
Saucerization Excision Additional Text (Leave Blank If You Do Not Want): The margin was drawn around the clinically apparent lesion.  Incisions were then made along these lines, in a tangential fashion, to the appropriate tissue plane and the lesion was extirpated.
Mastoid Interpolation Flap Text: A decision was made to reconstruct the defect utilizing an interpolation axial flap and a staged reconstruction.  A telfa template was made of the defect.  This telfa template was then used to outline the mastoid interpolation flap.  The donor area for the pedicle flap was then injected with anesthesia.  The flap was excised through the skin and subcutaneous tissue down to the layer of the underlying musculature.  The pedicle flap was carefully excised within this deep plane to maintain its blood supply.  The edges of the donor site were undermined.   The donor site was closed in a primary fashion.  The pedicle was then rotated into position and sutured.  Once the tube was sutured into place, adequate blood supply was confirmed with blanching and refill.  The pedicle was then wrapped with xeroform gauze and dressed appropriately with a telfa and gauze bandage to ensure continued blood supply and protect the attached pedicle.
Referring Physician (Optional): AMBROSE Garcia
Hemostasis: Electrocautery
Complex Repair And Dermal Autograft Text: The defect edges were debeveled with a #15 scalpel blade.  The primary defect was closed partially with a complex linear closure.  Given the location of the defect, shape of the defect and the proximity to free margins an dermal autograft was deemed most appropriate to repair the remaining defect.  The graft was trimmed to fit the size of the remaining defect.  The graft was then placed in the primary defect, oriented appropriately, and sutured into place.
Trilobed Flap Text: The defect edges were debeveled with a #15 scalpel blade.  Given the location of the defect and the proximity to free margins a trilobed flap was deemed most appropriate.  Using a sterile surgical marker, an appropriate trilobed flap drawn around the defect.    The area thus outlined was incised deep to adipose tissue with a #15 scalpel blade.  The skin margins were undermined to an appropriate distance in all directions utilizing iris scissors.
Complex Repair And Double Advancement Flap Text: The defect edges were debeveled with a #15 scalpel blade.  The primary defect was closed partially with a complex linear closure.  Given the location of the remaining defect, shape of the defect and the proximity to free margins a double advancement flap was deemed most appropriate for complete closure of the defect.  Using a sterile surgical marker, an appropriate advancement flap was drawn incorporating the defect and placing the expected incisions within the relaxed skin tension lines where possible.    The area thus outlined was incised deep to adipose tissue with a #15 scalpel blade.  The skin margins were undermined to an appropriate distance in all directions utilizing iris scissors.
Mercedes Flap Text: The defect edges were debeveled with a #15 scalpel blade.  Given the location of the defect, shape of the defect and the proximity to free margins a Mercedes flap was deemed most appropriate.  Using a sterile surgical marker, an appropriate advancement flap was drawn incorporating the defect and placing the expected incisions within the relaxed skin tension lines where possible. The area thus outlined was incised deep to adipose tissue with a #15 scalpel blade.  The skin margins were undermined to an appropriate distance in all directions utilizing iris scissors.
Slit Excision Additional Text (Leave Blank If You Do Not Want): A linear line was drawn on the skin overlying the lesion. An incision was made slowly until the lesion was visualized.  Once visualized, the lesion was removed with blunt dissection.
Melolabial Interpolation Flap Text: A decision was made to reconstruct the defect utilizing an interpolation axial flap and a staged reconstruction.  A telfa template was made of the defect.  This telfa template was then used to outline the melolabial interpolation flap.  The donor area for the pedicle flap was then injected with anesthesia.  The flap was excised through the skin and subcutaneous tissue down to the layer of the underlying musculature.  The pedicle flap was carefully excised within this deep plane to maintain its blood supply.  The edges of the donor site were undermined.   The donor site was closed in a primary fashion.  The pedicle was then rotated into position and sutured.  Once the tube was sutured into place, adequate blood supply was confirmed with blanching and refill.  The pedicle was then wrapped with xeroform gauze and dressed appropriately with a telfa and gauze bandage to ensure continued blood supply and protect the attached pedicle.
Information: Selecting Yes will display possible errors in your note based on the variables you have selected. This validation is only offered as a suggestion for you. PLEASE NOTE THAT THE VALIDATION TEXT WILL BE REMOVED WHEN YOU FINALIZE YOUR NOTE. IF YOU WANT TO FAX A PRELIMINARY NOTE YOU WILL NEED TO TOGGLE THIS TO 'NO' IF YOU DO NOT WANT IT IN YOUR FAXED NOTE.
Repair Type: Complex
Bilobed Transposition Flap Text: The defect edges were debeveled with a #15 scalpel blade.  Given the location of the defect and the proximity to free margins a bilobed transposition flap was deemed most appropriate.  Using a sterile surgical marker, an appropriate bilobe flap drawn around the defect.    The area thus outlined was incised deep to adipose tissue with a #15 scalpel blade.  The skin margins were undermined to an appropriate distance in all directions utilizing iris scissors.
Advancement-Rotation Flap Text: The defect edges were debeveled with a #15 scalpel blade.  Given the location of the defect, shape of the defect and the proximity to free margins an advancement-rotation flap was deemed most appropriate.  Using a sterile surgical marker, an appropriate flap was drawn incorporating the defect and placing the expected incisions within the relaxed skin tension lines where possible. The area thus outlined was incised deep to adipose tissue with a #15 scalpel blade.  The skin margins were undermined to an appropriate distance in all directions utilizing iris scissors.
Complex Repair And Single Advancement Flap Text: The defect edges were debeveled with a #15 scalpel blade.  The primary defect was closed partially with a complex linear closure.  Given the location of the remaining defect, shape of the defect and the proximity to free margins a single advancement flap was deemed most appropriate for complete closure of the defect.  Using a sterile surgical marker, an appropriate advancement flap was drawn incorporating the defect and placing the expected incisions within the relaxed skin tension lines where possible.    The area thus outlined was incised deep to adipose tissue with a #15 scalpel blade.  The skin margins were undermined to an appropriate distance in all directions utilizing iris scissors.
Interpolation Flap Text: A decision was made to reconstruct the defect utilizing an interpolation axial flap and a staged reconstruction.  A telfa template was made of the defect.  This telfa template was then used to outline the interpolation flap.  The donor area for the pedicle flap was then injected with anesthesia.  The flap was excised through the skin and subcutaneous tissue down to the layer of the underlying musculature.  The interpolation flap was carefully excised within this deep plane to maintain its blood supply.  The edges of the donor site were undermined.   The donor site was closed in a primary fashion.  The pedicle was then rotated into position and sutured.  Once the tube was sutured into place, adequate blood supply was confirmed with blanching and refill.  The pedicle was then wrapped with xeroform gauze and dressed appropriately with a telfa and gauze bandage to ensure continued blood supply and protect the attached pedicle.
Excisional Biopsy Additional Text (Leave Blank If You Do Not Want): The margin was drawn around the clinically apparent lesion. An elliptical shape was then drawn on the skin incorporating the lesion and margins.  Incisions were then made along these lines to the appropriate tissue plane and the lesion was extirpated.
Complex Repair And Epidermal Autograft Text: The defect edges were debeveled with a #15 scalpel blade.  The primary defect was closed partially with a complex linear closure.  Given the location of the defect, shape of the defect and the proximity to free margins an epidermal autograft was deemed most appropriate to repair the remaining defect.  The graft was trimmed to fit the size of the remaining defect.  The graft was then placed in the primary defect, oriented appropriately, and sutured into place.
Estimated Blood Loss (Cc): minimal
Lab: [id number]
Bilobed Flap Text: The defect edges were debeveled with a #15 scalpel blade.  Given the location of the defect and the proximity to free margins a bilobe flap was deemed most appropriate.  Using a sterile surgical marker, an appropriate bilobe flap drawn around the defect.    The area thus outlined was incised deep to adipose tissue with a #15 scalpel blade.  The skin margins were undermined to an appropriate distance in all directions utilizing iris scissors.
Partial Purse String (Simple) Text: Given the location of the defect and the characteristics of the surrounding skin a simple purse string closure was deemed most appropriate.  Undermining was performed circumferentially around the surgical defect.  A purse string suture was then placed and tightened. Wound tension of the circular defect prevented complete closure of the wound.
V-Y Flap Text: The defect edges were debeveled with a #15 scalpel blade.  Given the location of the defect, shape of the defect and the proximity to free margins a V-Y flap was deemed most appropriate.  Using a sterile surgical marker, an appropriate advancement flap was drawn incorporating the defect and placing the expected incisions within the relaxed skin tension lines where possible.    The area thus outlined was incised deep to adipose tissue with a #15 scalpel blade.  The skin margins were undermined to an appropriate distance in all directions utilizing iris scissors.
Perilesional Excision Additional Text (Leave Blank If You Do Not Want): The margin was drawn around the clinically apparent lesion. Incisions were then made along these lines to the appropriate tissue plane and the lesion was extirpated.
Cheek-To-Nose Interpolation Flap Text: A decision was made to reconstruct the defect utilizing an interpolation axial flap and a staged reconstruction.  A telfa template was made of the defect.  This telfa template was then used to outline the Cheek-To-Nose Interpolation flap.  The donor area for the pedicle flap was then injected with anesthesia.  The flap was excised through the skin and subcutaneous tissue down to the layer of the underlying musculature.  The interpolation flap was carefully excised within this deep plane to maintain its blood supply.  The edges of the donor site were undermined.   The donor site was closed in a primary fashion.  The pedicle was then rotated into position and sutured.  Once the tube was sutured into place, adequate blood supply was confirmed with blanching and refill.  The pedicle was then wrapped with xeroform gauze and dressed appropriately with a telfa and gauze bandage to ensure continued blood supply and protect the attached pedicle.
Complex Repair And Split-Thickness Skin Graft Text: The defect edges were debeveled with a #15 scalpel blade.  The primary defect was closed partially with a complex linear closure.  Given the location of the defect, shape of the defect and the proximity to free margins a split thickness skin graft was deemed most appropriate to repair the remaining defect.  The graft was trimmed to fit the size of the remaining defect.  The graft was then placed in the primary defect, oriented appropriately, and sutured into place.
Lab Facility: 
Double O-Z Flap Text: The defect edges were debeveled with a #15 scalpel blade.  Given the location of the defect, shape of the defect and the proximity to free margins a Double O-Z flap was deemed most appropriate.  Using a sterile surgical marker, an appropriate transposition flap was drawn incorporating the defect and placing the expected incisions within the relaxed skin tension lines where possible. The area thus outlined was incised deep to adipose tissue with a #15 scalpel blade.  The skin margins were undermined to an appropriate distance in all directions utilizing iris scissors.
Partial Purse String (Intermediate) Text: Given the location of the defect and the characteristics of the surrounding skin an intermediate purse string closure was deemed most appropriate.  Undermining was performed circumferentially around the surgical defect.  A purse string suture was then placed and tightened. Wound tension of the circular defect prevented complete closure of the wound.
Cheek Interpolation Flap Text: A decision was made to reconstruct the defect utilizing an interpolation axial flap and a staged reconstruction.  A telfa template was made of the defect.  This telfa template was then used to outline the Cheek Interpolation flap.  The donor area for the pedicle flap was then injected with anesthesia.  The flap was excised through the skin and subcutaneous tissue down to the layer of the underlying musculature.  The interpolation flap was carefully excised within this deep plane to maintain its blood supply.  The edges of the donor site were undermined.   The donor site was closed in a primary fashion.  The pedicle was then rotated into position and sutured.  Once the tube was sutured into place, adequate blood supply was confirmed with blanching and refill.  The pedicle was then wrapped with xeroform gauze and dressed appropriately with a telfa and gauze bandage to ensure continued blood supply and protect the attached pedicle.
Complex Repair And Ftsg Text: The defect edges were debeveled with a #15 scalpel blade.  The primary defect was closed partially with a complex linear closure.  Given the location of the defect, shape of the defect and the proximity to free margins a full thickness skin graft was deemed most appropriate to repair the remaining defect.  The graft was trimmed to fit the size of the remaining defect.  The graft was then placed in the primary defect, oriented appropriately, and sutured into place.
Banner Transposition Flap Text: The defect edges were debeveled with a #15 scalpel blade.  Given the location of the defect and the proximity to free margins a Banner transposition flap was deemed most appropriate.  Using a sterile surgical marker, an appropriate flap drawn around the defect. The area thus outlined was incised deep to adipose tissue with a #15 scalpel blade.  The skin margins were undermined to an appropriate distance in all directions utilizing iris scissors.
Anesthesia Volume In Cc: 10
Positioning (Leave Blank If You Do Not Want): The patient was placed in a comfortable position exposing the surgical site.
Excision Depth: adipose tissue
Graft Donor Site Bandage (Optional-Leave Blank If You Don't Want In Note): Steri-strips and a pressure bandage were applied to the donor site.
O-Z Flap Text: The defect edges were debeveled with a #15 scalpel blade.  Given the location of the defect, shape of the defect and the proximity to free margins an O-Z flap was deemed most appropriate.  Using a sterile surgical marker, an appropriate transposition flap was drawn incorporating the defect and placing the expected incisions within the relaxed skin tension lines where possible. The area thus outlined was incised deep to adipose tissue with a #15 scalpel blade.  The skin margins were undermined to an appropriate distance in all directions utilizing iris scissors.
Purse String (Simple) Text: Given the location of the defect and the characteristics of the surrounding skin a purse string simple closure was deemed most appropriate.  Undermining was performed circumferentially around the surgical defect.  A purse string suture was then placed and tightened.
Z Plasty Text: The lesion was extirpated to the level of the fat with a #15 scalpel blade.  Given the location of the defect, shape of the defect and the proximity to free margins a Z-plasty was deemed most appropriate for repair.  Using a sterile surgical marker, the appropriate transposition arms of the Z-plasty were drawn incorporating the defect and placing the expected incisions within the relaxed skin tension lines where possible.    The area thus outlined was incised deep to adipose tissue with a #15 scalpel blade.  The skin margins were undermined to an appropriate distance in all directions utilizing iris scissors.  The opposing transposition arms were then transposed into place in opposite direction and anchored with interrupted buried subcutaneous sutures.
Ear Star Wedge Flap Text: The defect edges were debeveled with a #15 blade scalpel.  Given the location of the defect and the proximity to free margins (helical rim) an ear star wedge flap was deemed most appropriate.  Using a sterile surgical marker, the appropriate flap was drawn incorporating the defect and placing the expected incisions between the helical rim and antihelix where possible.  The area thus outlined was incised through and through with a #15 scalpel blade.
Complex Repair And Dorsal Nasal Flap Text: The defect edges were debeveled with a #15 scalpel blade.  The primary defect was closed partially with a complex linear closure.  Given the location of the remaining defect, shape of the defect and the proximity to free margins a dorsal nasal flap was deemed most appropriate for complete closure of the defect.  Using a sterile surgical marker, an appropriate flap was drawn incorporating the defect and placing the expected incisions within the relaxed skin tension lines where possible.    The area thus outlined was incised deep to adipose tissue with a #15 scalpel blade.  The skin margins were undermined to an appropriate distance in all directions utilizing iris scissors.
Pre-Excision Curettage Text (Leave Blank If You Do Not Want): Prior to drawing the surgical margin the visible lesion was removed with electrodesiccation and curettage to clearly define the lesion size.
Purse String (Intermediate) Text: Given the location of the defect and the characteristics of the surrounding skin a purse string intermediate closure was deemed most appropriate.  Undermining was performed circumfirentially around the surgical defect.  A purse string suture was then placed and tightened.
O-L Flap Text: The defect edges were debeveled with a #15 scalpel blade.  Given the location of the defect, shape of the defect and the proximity to free margins an O-L flap was deemed most appropriate.  Using a sterile surgical marker, an appropriate advancement flap was drawn incorporating the defect and placing the expected incisions within the relaxed skin tension lines where possible.    The area thus outlined was incised deep to adipose tissue with a #15 scalpel blade.  The skin margins were undermined to an appropriate distance in all directions utilizing iris scissors.
W Plasty Text: The lesion was extirpated to the level of the fat with a #15 scalpel blade.  Given the location of the defect, shape of the defect and the proximity to free margins a W-plasty was deemed most appropriate for repair.  Using a sterile surgical marker, the appropriate transposition arms of the W-plasty were drawn incorporating the defect and placing the expected incisions within the relaxed skin tension lines where possible.    The area thus outlined was incised deep to adipose tissue with a #15 scalpel blade.  The skin margins were undermined to an appropriate distance in all directions utilizing iris scissors.  The opposing transposition arms were then transposed into place in opposite direction and anchored with interrupted buried subcutaneous sutures.
Complex Repair And Z Plasty Text: The defect edges were debeveled with a #15 scalpel blade.  The primary defect was closed partially with a complex linear closure.  Given the location of the remaining defect, shape of the defect and the proximity to free margins a Z plasty was deemed most appropriate for complete closure of the defect.  Using a sterile surgical marker, an appropriate advancement flap was drawn incorporating the defect and placing the expected incisions within the relaxed skin tension lines where possible.    The area thus outlined was incised deep to adipose tissue with a #15 scalpel blade.  The skin margins were undermined to an appropriate distance in all directions utilizing iris scissors.
Bilateral Helical Rim Advancement Flap Text: The defect edges were debeveled with a #15 blade scalpel.  Given the location of the defect and the proximity to free margins (helical rim) a bilateral helical rim advancement flap was deemed most appropriate.  Using a sterile surgical marker, the appropriate advancement flaps were drawn incorporating the defect and placing the expected incisions between the helical rim and antihelix where possible.  The area thus outlined was incised through and through with a #15 scalpel blade.  With a skin hook and iris scissors, the flaps were gently and sharply undermined and freed up.
Excision Method: Elliptical
Complex Repair Preamble Text (Leave Blank If You Do Not Want): Extensive wide undermining was performed.
Scalpel Size: 15 blade
Detail Level: Detailed
Xenograft Text: The defect edges were debeveled with a #15 scalpel blade.  Given the location of the defect, shape of the defect and the proximity to free margins a xenograft was deemed most appropriate.  The graft was then trimmed to fit the size of the defect.  The graft was then placed in the primary defect and oriented appropriately.
H Plasty Text: Given the location of the defect, shape of the defect and the proximity to free margins a H-plasty was deemed most appropriate for repair.  Using a sterile surgical marker, the appropriate advancement arms of the H-plasty were drawn incorporating the defect and placing the expected incisions within the relaxed skin tension lines where possible. The area thus outlined was incised deep to adipose tissue with a #15 scalpel blade. The skin margins were undermined to an appropriate distance in all directions utilizing iris scissors.  The opposing advancement arms were then advanced into place in opposite direction and anchored with interrupted buried subcutaneous sutures.
Helical Rim Advancement Flap Text: The defect edges were debeveled with a #15 blade scalpel.  Given the location of the defect and the proximity to free margins (helical rim) a double helical rim advancement flap was deemed most appropriate.  Using a sterile surgical marker, the appropriate advancement flaps were drawn incorporating the defect and placing the expected incisions between the helical rim and antihelix where possible.  The area thus outlined was incised through and through with a #15 scalpel blade.  With a skin hook and iris scissors, the flaps were gently and sharply undermined and freed up.
O-T Advancement Flap Text: The defect edges were debeveled with a #15 scalpel blade.  Given the location of the defect, shape of the defect and the proximity to free margins an O-T advancement flap was deemed most appropriate.  Using a sterile surgical marker, an appropriate advancement flap was drawn incorporating the defect and placing the expected incisions within the relaxed skin tension lines where possible.    The area thus outlined was incised deep to adipose tissue with a #15 scalpel blade.  The skin margins were undermined to an appropriate distance in all directions utilizing iris scissors.
Complex Repair And W Plasty Text: The defect edges were debeveled with a #15 scalpel blade.  The primary defect was closed partially with a complex linear closure.  Given the location of the remaining defect, shape of the defect and the proximity to free margins a W plasty was deemed most appropriate for complete closure of the defect.  Using a sterile surgical marker, an appropriate advancement flap was drawn incorporating the defect and placing the expected incisions within the relaxed skin tension lines where possible.    The area thus outlined was incised deep to adipose tissue with a #15 scalpel blade.  The skin margins were undermined to an appropriate distance in all directions utilizing iris scissors.
Size Of Margin In Cm: 0.4
Intermediate Repair Preamble Text (Leave Blank If You Do Not Want): Undermining was performed with blunt dissection.
Tissue Cultured Epidermal Autograft Text: The defect edges were debeveled with a #15 scalpel blade.  Given the location of the defect, shape of the defect and the proximity to free margins a tissue cultured epidermal autograft was deemed most appropriate.  The graft was then trimmed to fit the size of the defect.  The graft was then placed in the primary defect and oriented appropriately.
V-Y Plasty Text: The defect edges were debeveled with a #15 scalpel blade.  Given the location of the defect, shape of the defect and the proximity to free margins an V-Y advancement flap was deemed most appropriate.  Using a sterile surgical marker, an appropriate advancement flap was drawn incorporating the defect and placing the expected incisions within the relaxed skin tension lines where possible.    The area thus outlined was incised deep to adipose tissue with a #15 scalpel blade.  The skin margins were undermined to an appropriate distance in all directions utilizing iris scissors.
Where Do You Want The Question To Include Opioid Counseling Located?: Case Summary Tab
Bi-Rhombic Flap Text: The defect edges were debeveled with a #15 scalpel blade.  Given the location of the defect and the proximity to free margins a bi-rhombic flap was deemed most appropriate.  Using a sterile surgical marker, an appropriate rhombic flap was drawn incorporating the defect. The area thus outlined was incised deep to adipose tissue with a #15 scalpel blade.  The skin margins were undermined to an appropriate distance in all directions utilizing iris scissors.
Complex Repair And Double M Plasty Text: The defect edges were debeveled with a #15 scalpel blade.  The primary defect was closed partially with a complex linear closure.  Given the location of the remaining defect, shape of the defect and the proximity to free margins a double M plasty was deemed most appropriate for complete closure of the defect.  Using a sterile surgical marker, an appropriate advancement flap was drawn incorporating the defect and placing the expected incisions within the relaxed skin tension lines where possible.    The area thus outlined was incised deep to adipose tissue with a #15 scalpel blade.  The skin margins were undermined to an appropriate distance in all directions utilizing iris scissors.
A-T Advancement Flap Text: The defect edges were debeveled with a #15 scalpel blade.  Given the location of the defect, shape of the defect and the proximity to free margins an A-T advancement flap was deemed most appropriate.  Using a sterile surgical marker, an appropriate advancement flap was drawn incorporating the defect and placing the expected incisions within the relaxed skin tension lines where possible.    The area thus outlined was incised deep to adipose tissue with a #15 scalpel blade.  The skin margins were undermined to an appropriate distance in all directions utilizing iris scissors.
X Size Of Lesion In Cm (Optional): 1.5
Skin Substitute Text: The defect edges were debeveled with a #15 scalpel blade.  Given the location of the defect, shape of the defect and the proximity to free margins a skin substitute graft was deemed most appropriate.  The graft material was trimmed to fit the size of the defect. The graft was then placed in the primary defect and oriented appropriately.
S Plasty Text: Given the location and shape of the defect, and the orientation of relaxed skin tension lines, an S-plasty was deemed most appropriate for repair.  Using a sterile surgical marker, the appropriate outline of the S-plasty was drawn, incorporating the defect and placing the expected incisions within the relaxed skin tension lines where possible.  The area thus outlined was incised deep to adipose tissue with a #15 scalpel blade.  The skin margins were undermined to an appropriate distance in all directions utilizing iris scissors. The skin flaps were advanced over the defect.  The opposing margins were then approximated with interrupted buried subcutaneous sutures.
Complex Repair And M Plasty Text: The defect edges were debeveled with a #15 scalpel blade.  The primary defect was closed partially with a complex linear closure.  Given the location of the remaining defect, shape of the defect and the proximity to free margins an M plasty was deemed most appropriate for complete closure of the defect.  Using a sterile surgical marker, an appropriate advancement flap was drawn incorporating the defect and placing the expected incisions within the relaxed skin tension lines where possible.    The area thus outlined was incised deep to adipose tissue with a #15 scalpel blade.  The skin margins were undermined to an appropriate distance in all directions utilizing iris scissors.
Rhomboid Transposition Flap Text: The defect edges were debeveled with a #15 scalpel blade.  Given the location of the defect and the proximity to free margins a rhomboid transposition flap was deemed most appropriate.  Using a sterile surgical marker, an appropriate rhomboid flap was drawn incorporating the defect.    The area thus outlined was incised deep to adipose tissue with a #15 scalpel blade.  The skin margins were undermined to an appropriate distance in all directions utilizing iris scissors.
Home Suture Removal Text: Patient was provided a home suture removal kit and will remove their sutures at home.  If they have any questions or difficulties they will call the office.
Crescentic Advancement Flap Text: The defect edges were debeveled with a #15 scalpel blade.  Given the location of the defect and the proximity to free margins a crescentic advancement flap was deemed most appropriate.  Using a sterile surgical marker, the appropriate advancement flap was drawn incorporating the defect and placing the expected incisions within the relaxed skin tension lines where possible.    The area thus outlined was incised deep to adipose tissue with a #15 scalpel blade.  The skin margins were undermined to an appropriate distance in all directions utilizing iris scissors.
Dermal Autograft Text: The defect edges were debeveled with a #15 scalpel blade.  Given the location of the defect, shape of the defect and the proximity to free margins a dermal autograft was deemed most appropriate.  Using a sterile surgical marker, the primary defect shape was transferred to the donor site. The area thus outlined was incised deep to adipose tissue with a #15 scalpel blade.  The harvested graft was then trimmed of adipose and epidermal tissue until only dermis was left.  The skin graft was then placed in the primary defect and oriented appropriately.
Double O-Z Plasty Text: The defect edges were debeveled with a #15 scalpel blade.  Given the location of the defect, shape of the defect and the proximity to free margins a Double O-Z plasty (double transposition flap) was deemed most appropriate.  Using a sterile surgical marker, the appropriate transposition flaps were drawn incorporating the defect and placing the expected incisions within the relaxed skin tension lines where possible. The area thus outlined was incised deep to adipose tissue with a #15 scalpel blade.  The skin margins were undermined to an appropriate distance in all directions utilizing iris scissors.  Hemostasis was achieved with electrocautery.  The flaps were then transposed into place, one clockwise and the other counterclockwise, and anchored with interrupted buried subcutaneous sutures.
Rhombic Flap Text: The defect edges were debeveled with a #15 scalpel blade.  Given the location of the defect and the proximity to free margins a rhombic flap was deemed most appropriate.  Using a sterile surgical marker, an appropriate rhombic flap was drawn incorporating the defect.    The area thus outlined was incised deep to adipose tissue with a #15 scalpel blade.  The skin margins were undermined to an appropriate distance in all directions utilizing iris scissors.
Complex Repair And V-Y Plasty Text: The defect edges were debeveled with a #15 scalpel blade.  The primary defect was closed partially with a complex linear closure.  Given the location of the remaining defect, shape of the defect and the proximity to free margins a V-Y plasty was deemed most appropriate for complete closure of the defect.  Using a sterile surgical marker, an appropriate advancement flap was drawn incorporating the defect and placing the expected incisions within the relaxed skin tension lines where possible.    The area thus outlined was incised deep to adipose tissue with a #15 scalpel blade.  The skin margins were undermined to an appropriate distance in all directions utilizing iris scissors.
Chonodrocutaneous Helical Advancement Flap Text: The defect edges were debeveled with a #15 scalpel blade.  Given the location of the defect and the proximity to free margins a chondrocutaneous helical advancement flap was deemed most appropriate.  Using a sterile surgical marker, the appropriate advancement flap was drawn incorporating the defect and placing the expected incisions within the relaxed skin tension lines where possible.    The area thus outlined was incised deep to adipose tissue with a #15 scalpel blade.  The skin margins were undermined to an appropriate distance in all directions utilizing iris scissors.
Post-Care Instructions: I reviewed with the patient in detail post-care instructions. Patient is not to engage in any heavy lifting, exercise, or swimming for the next 14 days. Should the patient develop any fevers, chills, bleeding, severe pain patient will contact the office immediately.
Primary Defect Width (In Cm): 2.2
Epidermal Autograft Text: The defect edges were debeveled with a #15 scalpel blade.  Given the location of the defect, shape of the defect and the proximity to free margins an epidermal autograft was deemed most appropriate.  Using a sterile surgical marker, the primary defect shape was transferred to the donor site. The epidermal graft was then harvested.  The skin graft was then placed in the primary defect and oriented appropriately.
O-Z Plasty Text: The defect edges were debeveled with a #15 scalpel blade.  Given the location of the defect, shape of the defect and the proximity to free margins an O-Z plasty (double transposition flap) was deemed most appropriate.  Using a sterile surgical marker, the appropriate transposition flaps were drawn incorporating the defect and placing the expected incisions within the relaxed skin tension lines where possible.    The area thus outlined was incised deep to adipose tissue with a #15 scalpel blade.  The skin margins were undermined to an appropriate distance in all directions utilizing iris scissors.  Hemostasis was achieved with electrocautery.  The flaps were then transposed into place, one clockwise and the other counterclockwise, and anchored with interrupted buried subcutaneous sutures.
Melolabial Transposition Flap Text: The defect edges were debeveled with a #15 scalpel blade.  Given the location of the defect and the proximity to free margins a melolabial flap was deemed most appropriate.  Using a sterile surgical marker, an appropriate melolabial transposition flap was drawn incorporating the defect.    The area thus outlined was incised deep to adipose tissue with a #15 scalpel blade.  The skin margins were undermined to an appropriate distance in all directions utilizing iris scissors.
Complex Repair And Transposition Flap Text: The defect edges were debeveled with a #15 scalpel blade.  The primary defect was closed partially with a complex linear closure.  Given the location of the remaining defect, shape of the defect and the proximity to free margins a transposition flap was deemed most appropriate for complete closure of the defect.  Using a sterile surgical marker, an appropriate advancement flap was drawn incorporating the defect and placing the expected incisions within the relaxed skin tension lines where possible.    The area thus outlined was incised deep to adipose tissue with a #15 scalpel blade.  The skin margins were undermined to an appropriate distance in all directions utilizing iris scissors.
Burow's Advancement Flap Text: The defect edges were debeveled with a #15 scalpel blade.  Given the location of the defect and the proximity to free margins a Burow's advancement flap was deemed most appropriate.  Using a sterile surgical marker, the appropriate advancement flap was drawn incorporating the defect and placing the expected incisions within the relaxed skin tension lines where possible.    The area thus outlined was incised deep to adipose tissue with a #15 scalpel blade.  The skin margins were undermined to an appropriate distance in all directions utilizing iris scissors.
Composite Graft Text: The defect edges were debeveled with a #15 scalpel blade.  Given the location of the defect, shape of the defect, the proximity to free margins and the fact the defect was full thickness a composite graft was deemed most appropriate.  The defect was outline and then transferred to the donor site.  A full thickness graft was then excised from the donor site. The graft was then placed in the primary defect, oriented appropriately and then sutured into place.  The secondary defect was then repaired using a primary closure.
O-T Plasty Text: The defect edges were debeveled with a #15 scalpel blade.  Given the location of the defect, shape of the defect and the proximity to free margins an O-T plasty was deemed most appropriate.  Using a sterile surgical marker, an appropriate O-T plasty was drawn incorporating the defect and placing the expected incisions within the relaxed skin tension lines where possible.    The area thus outlined was incised deep to adipose tissue with a #15 scalpel blade.  The skin margins were undermined to an appropriate distance in all directions utilizing iris scissors.
Epidermal Closure: running subcuticular
Complex Repair And Rhombic Flap Text: The defect edges were debeveled with a #15 scalpel blade.  The primary defect was closed partially with a complex linear closure.  Given the location of the remaining defect, shape of the defect and the proximity to free margins a rhombic flap was deemed most appropriate for complete closure of the defect.  Using a sterile surgical marker, an appropriate advancement flap was drawn incorporating the defect and placing the expected incisions within the relaxed skin tension lines where possible.    The area thus outlined was incised deep to adipose tissue with a #15 scalpel blade.  The skin margins were undermined to an appropriate distance in all directions utilizing iris scissors.
Muscle Hinge Flap Text: The defect edges were debeveled with a #15 scalpel blade.  Given the size, depth and location of the defect and the proximity to free margins a muscle hinge flap was deemed most appropriate.  Using a sterile surgical marker, an appropriate hinge flap was drawn incorporating the defect. The area thus outlined was incised with a #15 scalpel blade.  The skin margins were undermined to an appropriate distance in all directions utilizing iris scissors.
Advancement Flap (Double) Text: The defect edges were debeveled with a #15 scalpel blade.  Given the location of the defect and the proximity to free margins a double advancement flap was deemed most appropriate.  Using a sterile surgical marker, the appropriate advancement flaps were drawn incorporating the defect and placing the expected incisions within the relaxed skin tension lines where possible.    The area thus outlined was incised deep to adipose tissue with a #15 scalpel blade.  The skin margins were undermined to an appropriate distance in all directions utilizing iris scissors.
Anesthesia Type: 1% lidocaine with epinephrine
Anesthesia Type: 0.5% lidocaine with 1:200,000 epinephrine and a 1:10 solution of 8.4% sodium bicarbonate and 408mcg clindamycin/ml
Cartilage Graft Text: The defect edges were debeveled with a #15 scalpel blade.  Given the location of the defect, shape of the defect, the fact the defect involved a full thickness cartilage defect a cartilage graft was deemed most appropriate.  An appropriate donor site was identified, cleansed, and anesthetized. The cartilage graft was then harvested and transferred to the recipient site, oriented appropriately and then sutured into place.  The secondary defect was then repaired using a primary closure.
Keystone Flap Text: The defect edges were debeveled with a #15 scalpel blade.  Given the location of the defect, shape of the defect a keystone flap was deemed most appropriate.  Using a sterile surgical marker, an appropriate keystone flap was drawn incorporating the defect, outlining the appropriate donor tissue and placing the expected incisions within the relaxed skin tension lines where possible. The area thus outlined was incised deep to adipose tissue with a #15 scalpel blade.  The skin margins were undermined to an appropriate distance in all directions around the primary defect and laterally outward around the flap utilizing iris scissors.
Transposition Flap Text: The defect edges were debeveled with a #15 scalpel blade.  Given the location of the defect and the proximity to free margins a transposition flap was deemed most appropriate.  Using a sterile surgical marker, an appropriate transposition flap was drawn incorporating the defect.    The area thus outlined was incised deep to adipose tissue with a #15 scalpel blade.  The skin margins were undermined to an appropriate distance in all directions utilizing iris scissors.
Complex Repair And Rotation Flap Text: The defect edges were debeveled with a #15 scalpel blade.  The primary defect was closed partially with a complex linear closure.  Given the location of the remaining defect, shape of the defect and the proximity to free margins a rotation flap was deemed most appropriate for complete closure of the defect.  Using a sterile surgical marker, an appropriate advancement flap was drawn incorporating the defect and placing the expected incisions within the relaxed skin tension lines where possible.    The area thus outlined was incised deep to adipose tissue with a #15 scalpel blade.  The skin margins were undermined to an appropriate distance in all directions utilizing iris scissors.
Advancement Flap (Single) Text: The defect edges were debeveled with a #15 scalpel blade.  Given the location of the defect and the proximity to free margins a single advancement flap was deemed most appropriate.  Using a sterile surgical marker, an appropriate advancement flap was drawn incorporating the defect and placing the expected incisions within the relaxed skin tension lines where possible.    The area thus outlined was incised deep to adipose tissue with a #15 scalpel blade.  The skin margins were undermined to an appropriate distance in all directions utilizing iris scissors.
Consent was obtained from the patient. The risks and benefits to therapy were discussed in detail. Specifically, the risks of infection, scarring, bleeding, prolonged wound healing, incomplete removal, allergy to anesthesia, nerve injury and recurrence were addressed. Prior to the procedure, the treatment site was clearly identified and confirmed by the patient. All components of Universal Protocol/PAUSE Rule completed.
Epidermal Closure Graft Donor Site (Optional): simple interrupted
Previous Accession (Optional): [id number]
Split-Thickness Skin Graft Text: The defect edges were debeveled with a #15 scalpel blade.  Given the location of the defect, shape of the defect and the proximity to free margins a split thickness skin graft was deemed most appropriate.  Using a sterile surgical marker, the primary defect shape was transferred to the donor site. The split thickness graft was then harvested.  The skin graft was then placed in the primary defect and oriented appropriately.
Island Pedicle Flap-Requiring Vessel Identification Text: The defect edges were debeveled with a #15 scalpel blade.  Given the location of the defect, shape of the defect and the proximity to free margins an island pedicle advancement flap was deemed most appropriate.  Using a sterile surgical marker, an appropriate advancement flap was drawn, based on the axial vessel mentioned above, incorporating the defect, outlining the appropriate donor tissue and placing the expected incisions within the relaxed skin tension lines where possible.    The area thus outlined was incised deep to adipose tissue with a #15 scalpel blade.  The skin margins were undermined to an appropriate distance in all directions around the primary defect and laterally outward around the island pedicle utilizing iris scissors.  There was minimal undermining beneath the pedicle flap.
Complex Repair And Melolabial Flap Text: The defect edges were debeveled with a #15 scalpel blade.  The primary defect was closed partially with a complex linear closure.  Given the location of the remaining defect, shape of the defect and the proximity to free margins a melolabial flap was deemed most appropriate for complete closure of the defect.  Using a sterile surgical marker, an appropriate advancement flap was drawn incorporating the defect and placing the expected incisions within the relaxed skin tension lines where possible.    The area thus outlined was incised deep to adipose tissue with a #15 scalpel blade.  The skin margins were undermined to an appropriate distance in all directions utilizing iris scissors.
Star Wedge Flap Text: The defect edges were debeveled with a #15 scalpel blade.  Given the location of the defect, shape of the defect and the proximity to free margins a star wedge flap was deemed most appropriate.  Using a sterile surgical marker, an appropriate rotation flap was drawn incorporating the defect and placing the expected incisions within the relaxed skin tension lines where possible. The area thus outlined was incised deep to adipose tissue with a #15 scalpel blade.  The skin margins were undermined to an appropriate distance in all directions utilizing iris scissors.
No Repair - Repaired With Adjacent Surgical Defect Text (Leave Blank If You Do Not Want): After the excision the defect was repaired concurrently with another surgical defect which was in close approximation.
Anesthesia Volume In Cc: 7
Ftsg Text: The defect edges were debeveled with a #15 scalpel blade.  Given the location of the defect, shape of the defect and the proximity to free margins a full thickness skin graft was deemed most appropriate.  Using a sterile surgical marker, the primary defect shape was transferred to the donor site. The area thus outlined was incised deep to adipose tissue with a #15 scalpel blade.  The harvested graft was then trimmed of adipose tissue until only dermis and epidermis was left.  The skin margins of the secondary defect were undermined to an appropriate distance in all directions utilizing iris scissors.  The secondary defect was closed with interrupted buried subcutaneous sutures.  The skin edges were then re-apposed with running  sutures.  The skin graft was then placed in the primary defect and oriented appropriately.
Double Island Pedicle Flap Text: The defect edges were debeveled with a #15 scalpel blade.  Given the location of the defect, shape of the defect and the proximity to free margins a double island pedicle advancement flap was deemed most appropriate.  Using a sterile surgical marker, an appropriate advancement flap was drawn incorporating the defect, outlining the appropriate donor tissue and placing the expected incisions within the relaxed skin tension lines where possible.    The area thus outlined was incised deep to adipose tissue with a #15 scalpel blade.  The skin margins were undermined to an appropriate distance in all directions around the primary defect and laterally outward around the island pedicle utilizing iris scissors.  There was minimal undermining beneath the pedicle flap.
Spiral Flap Text: The defect edges were debeveled with a #15 scalpel blade.  Given the location of the defect, shape of the defect and the proximity to free margins a spiral flap was deemed most appropriate.  Using a sterile surgical marker, an appropriate rotation flap was drawn incorporating the defect and placing the expected incisions within the relaxed skin tension lines where possible. The area thus outlined was incised deep to adipose tissue with a #15 scalpel blade.  The skin margins were undermined to an appropriate distance in all directions utilizing iris scissors.
Repair Performed By Another Provider Text (Leave Blank If You Do Not Want): After the tissue was excised the defect was repaired by another provider.
Complex Repair And Bilobe Flap Text: The defect edges were debeveled with a #15 scalpel blade.  The primary defect was closed partially with a complex linear closure.  Given the location of the remaining defect, shape of the defect and the proximity to free margins a bilobe flap was deemed most appropriate for complete closure of the defect.  Using a sterile surgical marker, an appropriate advancement flap was drawn incorporating the defect and placing the expected incisions within the relaxed skin tension lines where possible.    The area thus outlined was incised deep to adipose tissue with a #15 scalpel blade.  The skin margins were undermined to an appropriate distance in all directions utilizing iris scissors.
Alar Island Pedicle Flap Text: The defect edges were debeveled with a #15 scalpel blade.  Given the location of the defect, shape of the defect and the proximity to the alar rim an island pedicle advancement flap was deemed most appropriate.  Using a sterile surgical marker, an appropriate advancement flap was drawn incorporating the defect, outlining the appropriate donor tissue and placing the expected incisions within the nasal ala running parallel to the alar rim. The area thus outlined was incised with a #15 scalpel blade.  The skin margins were undermined minimally to an appropriate distance in all directions around the primary defect and laterally outward around the island pedicle utilizing iris scissors.  There was minimal undermining beneath the pedicle flap.
Rotation Flap Text: The defect edges were debeveled with a #15 scalpel blade.  Given the location of the defect, shape of the defect and the proximity to free margins a rotation flap was deemed most appropriate.  Using a sterile surgical marker, an appropriate rotation flap was drawn incorporating the defect and placing the expected incisions within the relaxed skin tension lines where possible.    The area thus outlined was incised deep to adipose tissue with a #15 scalpel blade.  The skin margins were undermined to an appropriate distance in all directions utilizing iris scissors.
Complex Repair And O-L Flap Text: The defect edges were debeveled with a #15 scalpel blade.  The primary defect was closed partially with a complex linear closure.  Given the location of the remaining defect, shape of the defect and the proximity to free margins an O-L flap was deemed most appropriate for complete closure of the defect.  Using a sterile surgical marker, an appropriate flap was drawn incorporating the defect and placing the expected incisions within the relaxed skin tension lines where possible.    The area thus outlined was incised deep to adipose tissue with a #15 scalpel blade.  The skin margins were undermined to an appropriate distance in all directions utilizing iris scissors.
Lip Wedge Excision Repair Text: Given the location of the defect and the proximity to free margins a full thickness wedge repair was deemed most appropriate.  Using a sterile surgical marker, the appropriate repair was drawn incorporating the defect and placing the expected incisions perpendicular to the vermilion border.  The vermilion border was also meticulously outlined to ensure appropriate reapproximation during the repair.  The area thus outlined was incised through and through with a #15 scalpel blade.  The muscularis and dermis were reaproximated with deep sutures following hemostasis. Care was taken to realign the vermilion border before proceeding with the superficial closure.  Once the vermilion was realigned the superfical and mucosal closure was finished.
Path Notes (To The Dermatopathologist): Please check margins.
Curvilinear Excision Additional Text (Leave Blank If You Do Not Want): The margin was drawn around the clinically apparent lesion.  A curvilinear shape was then drawn on the skin incorporating the lesion and margins.  Incisions were then made along these lines to the appropriate tissue plane and the lesion was extirpated.
Wound Care: Petrolatum
Additional Anesthesia Volume In Cc: 3
Complex Repair And Skin Substitute Graft Text: The defect edges were debeveled with a #15 scalpel blade.  The primary defect was closed partially with a complex linear closure.  Given the location of the remaining defect, shape of the defect and the proximity to free margins a skin substitute graft was deemed most appropriate to repair the remaining defect.  The graft was trimmed to fit the size of the remaining defect.  The graft was then placed in the primary defect, oriented appropriately, and sutured into place.
Island Pedicle Flap With Canthal Suspension Text: The defect edges were debeveled with a #15 scalpel blade.  Given the location of the defect, shape of the defect and the proximity to free margins an island pedicle advancement flap was deemed most appropriate.  Using a sterile surgical marker, an appropriate advancement flap was drawn incorporating the defect, outlining the appropriate donor tissue and placing the expected incisions within the relaxed skin tension lines where possible. The area thus outlined was incised deep to adipose tissue with a #15 scalpel blade.  The skin margins were undermined to an appropriate distance in all directions around the primary defect and laterally outward around the island pedicle utilizing iris scissors.  There was minimal undermining beneath the pedicle flap. A suspension suture was placed in the canthal tendon to prevent tension and prevent ectropion.
Complex Repair And O-T Advancement Flap Text: The defect edges were debeveled with a #15 scalpel blade.  The primary defect was closed partially with a complex linear closure.  Given the location of the remaining defect, shape of the defect and the proximity to free margins an O-T advancement flap was deemed most appropriate for complete closure of the defect.  Using a sterile surgical marker, an appropriate advancement flap was drawn incorporating the defect and placing the expected incisions within the relaxed skin tension lines where possible.    The area thus outlined was incised deep to adipose tissue with a #15 scalpel blade.  The skin margins were undermined to an appropriate distance in all directions utilizing iris scissors.
Hatchet Flap Text: The defect edges were debeveled with a #15 scalpel blade.  Given the location of the defect, shape of the defect and the proximity to free margins a hatchet flap was deemed most appropriate.  Using a sterile surgical marker, an appropriate hatchet flap was drawn incorporating the defect and placing the expected incisions within the relaxed skin tension lines where possible.    The area thus outlined was incised deep to adipose tissue with a #15 scalpel blade.  The skin margins were undermined to an appropriate distance in all directions utilizing iris scissors.
Fusiform Excision Additional Text (Leave Blank If You Do Not Want): The margin was drawn around the clinically apparent lesion.  A fusiform shape was then drawn on the skin incorporating the lesion and margins.  Incisions were then made along these lines to the appropriate tissue plane and the lesion was extirpated.
Paramedian Forehead Flap Text: A decision was made to reconstruct the defect utilizing an interpolation axial flap and a staged reconstruction.  A telfa template was made of the defect.  This telfa template was then used to outline the paramedian forehead pedicle flap.  The donor area for the pedicle flap was then injected with anesthesia.  The flap was excised through the skin and subcutaneous tissue down to the layer of the underlying musculature.  The pedicle flap was carefully excised within this deep plane to maintain its blood supply.  The edges of the donor site were undermined.   The donor site was closed in a primary fashion.  The pedicle was then rotated into position and sutured.  Once the tube was sutured into place, adequate blood supply was confirmed with blanching and refill.  The pedicle was then wrapped with xeroform gauze and dressed appropriately with a telfa and gauze bandage to ensure continued blood supply and protect the attached pedicle.

## 2020-10-26 ENCOUNTER — APPOINTMENT (RX ONLY)
Dept: URBAN - METROPOLITAN AREA CLINIC 4 | Facility: CLINIC | Age: 85
Setting detail: DERMATOLOGY
End: 2020-10-26

## 2020-10-26 DIAGNOSIS — L40.0 PSORIASIS VULGARIS: ICD-10-CM

## 2020-10-26 DIAGNOSIS — L81.4 OTHER MELANIN HYPERPIGMENTATION: ICD-10-CM

## 2020-10-26 DIAGNOSIS — L57.0 ACTINIC KERATOSIS: ICD-10-CM

## 2020-10-26 DIAGNOSIS — Z71.89 OTHER SPECIFIED COUNSELING: ICD-10-CM

## 2020-10-26 DIAGNOSIS — L82.1 OTHER SEBORRHEIC KERATOSIS: ICD-10-CM

## 2020-10-26 DIAGNOSIS — D22 MELANOCYTIC NEVI: ICD-10-CM

## 2020-10-26 DIAGNOSIS — D18.0 HEMANGIOMA: ICD-10-CM

## 2020-10-26 PROBLEM — D18.01 HEMANGIOMA OF SKIN AND SUBCUTANEOUS TISSUE: Status: ACTIVE | Noted: 2020-10-26

## 2020-10-26 PROBLEM — D22.9 MELANOCYTIC NEVI, UNSPECIFIED: Status: ACTIVE | Noted: 2020-10-26

## 2020-10-26 PROCEDURE — 99213 OFFICE O/P EST LOW 20 MIN: CPT | Mod: 25

## 2020-10-26 PROCEDURE — ? LIQUID NITROGEN

## 2020-10-26 PROCEDURE — ? SUNSCREEN RECOMMENDATIONS

## 2020-10-26 PROCEDURE — ? COUNSELING

## 2020-10-26 PROCEDURE — 17004 DESTROY PREMAL LESIONS 15/>: CPT

## 2020-10-26 ASSESSMENT — LOCATION SIMPLE DESCRIPTION DERM
LOCATION SIMPLE: LEFT THIGH
LOCATION SIMPLE: RIGHT UPPER ARM
LOCATION SIMPLE: RIGHT CHEEK
LOCATION SIMPLE: LEFT CHEEK
LOCATION SIMPLE: LEFT FOREHEAD
LOCATION SIMPLE: LEFT UPPER ARM
LOCATION SIMPLE: LEFT ELBOW
LOCATION SIMPLE: RIGHT SCALP
LOCATION SIMPLE: RIGHT HAND
LOCATION SIMPLE: RIGHT FOREARM
LOCATION SIMPLE: RIGHT PRETIBIAL REGION
LOCATION SIMPLE: RIGHT EAR
LOCATION SIMPLE: LEFT PRETIBIAL REGION
LOCATION SIMPLE: LEFT FOREARM
LOCATION SIMPLE: LOWER BACK
LOCATION SIMPLE: RIGHT FOREHEAD

## 2020-10-26 ASSESSMENT — LOCATION ZONE DERM
LOCATION ZONE: LEG
LOCATION ZONE: FACE
LOCATION ZONE: TRUNK
LOCATION ZONE: ARM
LOCATION ZONE: HAND
LOCATION ZONE: SCALP
LOCATION ZONE: EAR

## 2020-10-26 ASSESSMENT — LOCATION DETAILED DESCRIPTION DERM
LOCATION DETAILED: RIGHT ANTERIOR PROXIMAL UPPER ARM
LOCATION DETAILED: LEFT ANTERIOR PROXIMAL UPPER ARM
LOCATION DETAILED: LEFT SUPERIOR CENTRAL BUCCAL CHEEK
LOCATION DETAILED: LEFT LATERAL DISTAL UPPER ARM
LOCATION DETAILED: RIGHT ULNAR DORSAL HAND
LOCATION DETAILED: RIGHT ANTERIOR DISTAL UPPER ARM
LOCATION DETAILED: RIGHT CENTRAL MALAR CHEEK
LOCATION DETAILED: RIGHT RADIAL DORSAL HAND
LOCATION DETAILED: LEFT PROXIMAL PRETIBIAL REGION
LOCATION DETAILED: LEFT INFERIOR FOREHEAD
LOCATION DETAILED: LEFT PROXIMAL RADIAL DORSAL FOREARM
LOCATION DETAILED: RIGHT DORSAL MIDDLE METACARPOPHALANGEAL JOINT
LOCATION DETAILED: LEFT ANTERIOR DISTAL THIGH
LOCATION DETAILED: LEFT ANTECUBITAL SKIN
LOCATION DETAILED: RIGHT PROXIMAL DORSAL FOREARM
LOCATION DETAILED: RIGHT PROXIMAL PRETIBIAL REGION
LOCATION DETAILED: SUPERIOR LUMBAR SPINE
LOCATION DETAILED: RIGHT SUPERIOR HELIX
LOCATION DETAILED: RIGHT SUPERIOR LATERAL FOREHEAD
LOCATION DETAILED: RIGHT LATERAL FOREHEAD
LOCATION DETAILED: LEFT DISTAL DORSAL FOREARM
LOCATION DETAILED: LEFT FOREHEAD
LOCATION DETAILED: RIGHT LATERAL PROXIMAL UPPER ARM
LOCATION DETAILED: LEFT SUPERIOR MEDIAL FOREHEAD
LOCATION DETAILED: RIGHT CENTRAL FRONTAL SCALP

## 2020-10-26 NOTE — HPI: UPPER BODY SKIN CHECK
How Severe Are Your Spot(S)?: mild
What Is The Reason For Today's Visit?: Upper Body Skin Exam
Additional History: FBE.

## 2020-10-26 NOTE — PROCEDURE: LIQUID NITROGEN
Render Post-Care Instructions In Note?: no
Detail Level: Simple
Post-Care Instructions: I reviewed with the patient in detail post-care instructions. Patient is to wear sunprotection, and avoid picking at any of the treated lesions. Pt may apply Vaseline to crusted or scabbing areas.
Number Of Freeze-Thaw Cycles: 2 freeze-thaw cycles
Duration Of Freeze Thaw-Cycle (Seconds): 2
Consent: The patient's consent was obtained including but not limited to risks of crusting, scabbing, blistering, scarring, darker or lighter pigmentary change, recurrence, incomplete removal and infection.

## 2020-10-28 ENCOUNTER — HOSPITAL ENCOUNTER (OUTPATIENT)
Dept: HOSPITAL 8 - CFH | Age: 85
Discharge: HOME | End: 2020-10-28
Attending: INTERNAL MEDICINE
Payer: MEDICARE

## 2020-10-28 DIAGNOSIS — I25.10: ICD-10-CM

## 2020-10-28 DIAGNOSIS — I10: Primary | ICD-10-CM

## 2020-10-28 PROCEDURE — A9502 TC99M TETROFOSMIN: HCPCS

## 2020-10-28 PROCEDURE — 93017 CV STRESS TEST TRACING ONLY: CPT

## 2020-10-28 PROCEDURE — 78452 HT MUSCLE IMAGE SPECT MULT: CPT

## 2021-01-14 DIAGNOSIS — Z23 NEED FOR VACCINATION: ICD-10-CM

## 2022-01-19 ENCOUNTER — APPOINTMENT (OUTPATIENT)
Dept: RADIOLOGY | Facility: MEDICAL CENTER | Age: 87
DRG: 521 | End: 2022-01-19
Attending: STUDENT IN AN ORGANIZED HEALTH CARE EDUCATION/TRAINING PROGRAM
Payer: MEDICARE

## 2022-01-19 ENCOUNTER — HOSPITAL ENCOUNTER (INPATIENT)
Facility: MEDICAL CENTER | Age: 87
LOS: 14 days | DRG: 521 | End: 2022-02-02
Attending: STUDENT IN AN ORGANIZED HEALTH CARE EDUCATION/TRAINING PROGRAM | Admitting: STUDENT IN AN ORGANIZED HEALTH CARE EDUCATION/TRAINING PROGRAM
Payer: MEDICARE

## 2022-01-19 DIAGNOSIS — R55 VASOVAGAL SYNCOPE: ICD-10-CM

## 2022-01-19 DIAGNOSIS — S72.001A CLOSED FRACTURE OF RIGHT HIP, INITIAL ENCOUNTER (HCC): ICD-10-CM

## 2022-01-19 PROCEDURE — 36415 COLL VENOUS BLD VENIPUNCTURE: CPT

## 2022-01-19 PROCEDURE — 80048 BASIC METABOLIC PNL TOTAL CA: CPT

## 2022-01-19 PROCEDURE — 72192 CT PELVIS W/O DYE: CPT | Mod: MG

## 2022-01-19 PROCEDURE — 87426 SARSCOV CORONAVIRUS AG IA: CPT

## 2022-01-19 PROCEDURE — 99285 EMERGENCY DEPT VISIT HI MDM: CPT

## 2022-01-19 PROCEDURE — 72125 CT NECK SPINE W/O DYE: CPT | Mod: MG

## 2022-01-19 PROCEDURE — 72170 X-RAY EXAM OF PELVIS: CPT

## 2022-01-19 PROCEDURE — 71045 X-RAY EXAM CHEST 1 VIEW: CPT

## 2022-01-19 PROCEDURE — 85025 COMPLETE CBC W/AUTO DIFF WBC: CPT

## 2022-01-19 PROCEDURE — 70450 CT HEAD/BRAIN W/O DYE: CPT | Mod: ME

## 2022-01-19 PROCEDURE — 99223 1ST HOSP IP/OBS HIGH 75: CPT | Mod: AI | Performed by: STUDENT IN AN ORGANIZED HEALTH CARE EDUCATION/TRAINING PROGRAM

## 2022-01-19 PROCEDURE — 770006 HCHG ROOM/CARE - MED/SURG/GYN SEMI*

## 2022-01-19 PROCEDURE — 73552 X-RAY EXAM OF FEMUR 2/>: CPT | Mod: RT

## 2022-01-19 PROCEDURE — 0SRR0J9 REPLACEMENT OF RIGHT HIP JOINT, FEMORAL SURFACE WITH SYNTHETIC SUBSTITUTE, CEMENTED, OPEN APPROACH: ICD-10-PCS | Performed by: ORTHOPAEDIC SURGERY

## 2022-01-19 RX ORDER — ASPIRIN 81 MG/1
81 TABLET, CHEWABLE ORAL DAILY
COMMUNITY
End: 2022-08-22

## 2022-01-20 PROBLEM — I25.10 CAD (CORONARY ARTERY DISEASE), NATIVE CORONARY ARTERY: Status: ACTIVE | Noted: 2022-01-20

## 2022-01-20 PROBLEM — R71.8 ELEVATED MCV: Status: ACTIVE | Noted: 2022-01-20

## 2022-01-20 PROBLEM — N18.31 STAGE 3A CHRONIC KIDNEY DISEASE: Status: ACTIVE | Noted: 2022-01-20

## 2022-01-20 LAB
ANION GAP SERPL CALC-SCNC: 14 MMOL/L (ref 7–16)
BASOPHILS # BLD AUTO: 0.3 % (ref 0–1.8)
BASOPHILS # BLD: 0.03 K/UL (ref 0–0.12)
BUN SERPL-MCNC: 26 MG/DL (ref 8–22)
CALCIUM SERPL-MCNC: 9.2 MG/DL (ref 8.5–10.5)
CHLORIDE SERPL-SCNC: 105 MMOL/L (ref 96–112)
CO2 SERPL-SCNC: 20 MMOL/L (ref 20–33)
CREAT SERPL-MCNC: 1.21 MG/DL (ref 0.5–1.4)
EOSINOPHIL # BLD AUTO: 0.08 K/UL (ref 0–0.51)
EOSINOPHIL NFR BLD: 0.9 % (ref 0–6.9)
ERYTHROCYTE [DISTWIDTH] IN BLOOD BY AUTOMATED COUNT: 46.4 FL (ref 35.9–50)
GLUCOSE SERPL-MCNC: 108 MG/DL (ref 65–99)
HCT VFR BLD AUTO: 43.5 % (ref 42–52)
HGB BLD-MCNC: 14.6 G/DL (ref 14–18)
IMM GRANULOCYTES # BLD AUTO: 0.03 K/UL (ref 0–0.11)
IMM GRANULOCYTES NFR BLD AUTO: 0.3 % (ref 0–0.9)
LYMPHOCYTES # BLD AUTO: 1 K/UL (ref 1–4.8)
LYMPHOCYTES NFR BLD: 11.1 % (ref 22–41)
MCH RBC QN AUTO: 33.9 PG (ref 27–33)
MCHC RBC AUTO-ENTMCNC: 33.6 G/DL (ref 33.7–35.3)
MCV RBC AUTO: 100.9 FL (ref 81.4–97.8)
MONOCYTES # BLD AUTO: 0.79 K/UL (ref 0–0.85)
MONOCYTES NFR BLD AUTO: 8.8 % (ref 0–13.4)
NEUTROPHILS # BLD AUTO: 7.08 K/UL (ref 1.82–7.42)
NEUTROPHILS NFR BLD: 78.6 % (ref 44–72)
NRBC # BLD AUTO: 0 K/UL
NRBC BLD-RTO: 0 /100 WBC
PLATELET # BLD AUTO: 151 K/UL (ref 164–446)
PMV BLD AUTO: 9.7 FL (ref 9–12.9)
POTASSIUM SERPL-SCNC: 4.4 MMOL/L (ref 3.6–5.5)
RBC # BLD AUTO: 4.31 M/UL (ref 4.7–6.1)
SARS-COV+SARS-COV-2 AG RESP QL IA.RAPID: NOTDETECTED
SODIUM SERPL-SCNC: 139 MMOL/L (ref 135–145)
SPECIMEN SOURCE: NORMAL
WBC # BLD AUTO: 9 K/UL (ref 4.8–10.8)

## 2022-01-20 PROCEDURE — 700102 HCHG RX REV CODE 250 W/ 637 OVERRIDE(OP): Performed by: STUDENT IN AN ORGANIZED HEALTH CARE EDUCATION/TRAINING PROGRAM

## 2022-01-20 PROCEDURE — 96376 TX/PRO/DX INJ SAME DRUG ADON: CPT

## 2022-01-20 PROCEDURE — 99221 1ST HOSP IP/OBS SF/LOW 40: CPT | Mod: 57 | Performed by: ORTHOPAEDIC SURGERY

## 2022-01-20 PROCEDURE — 700111 HCHG RX REV CODE 636 W/ 250 OVERRIDE (IP): Performed by: STUDENT IN AN ORGANIZED HEALTH CARE EDUCATION/TRAINING PROGRAM

## 2022-01-20 PROCEDURE — 99233 SBSQ HOSP IP/OBS HIGH 50: CPT | Performed by: STUDENT IN AN ORGANIZED HEALTH CARE EDUCATION/TRAINING PROGRAM

## 2022-01-20 PROCEDURE — A9270 NON-COVERED ITEM OR SERVICE: HCPCS | Performed by: STUDENT IN AN ORGANIZED HEALTH CARE EDUCATION/TRAINING PROGRAM

## 2022-01-20 PROCEDURE — 770001 HCHG ROOM/CARE - MED/SURG/GYN PRIV*

## 2022-01-20 PROCEDURE — 96374 THER/PROPH/DIAG INJ IV PUSH: CPT

## 2022-01-20 RX ORDER — MORPHINE SULFATE 4 MG/ML
4 INJECTION INTRAVENOUS EVERY 4 HOURS PRN
Status: DISCONTINUED | OUTPATIENT
Start: 2022-01-20 | End: 2022-01-21

## 2022-01-20 RX ORDER — CHOLECALCIFEROL (VITAMIN D3) 125 MCG
1000 CAPSULE ORAL DAILY
Status: DISCONTINUED | OUTPATIENT
Start: 2022-01-20 | End: 2022-02-02 | Stop reason: HOSPADM

## 2022-01-20 RX ORDER — ATORVASTATIN CALCIUM 40 MG/1
40 TABLET, FILM COATED ORAL DAILY
Status: DISCONTINUED | OUTPATIENT
Start: 2022-01-20 | End: 2022-02-02 | Stop reason: HOSPADM

## 2022-01-20 RX ORDER — LABETALOL HYDROCHLORIDE 5 MG/ML
10 INJECTION, SOLUTION INTRAVENOUS EVERY 4 HOURS PRN
Status: DISCONTINUED | OUTPATIENT
Start: 2022-01-20 | End: 2022-02-02 | Stop reason: HOSPADM

## 2022-01-20 RX ORDER — AMOXICILLIN 250 MG
2 CAPSULE ORAL 2 TIMES DAILY
Status: DISCONTINUED | OUTPATIENT
Start: 2022-01-20 | End: 2022-01-21

## 2022-01-20 RX ORDER — CHOLECALCIFEROL (VITAMIN D3) 125 MCG
30 CAPSULE ORAL NIGHTLY
Status: DISCONTINUED | OUTPATIENT
Start: 2022-01-20 | End: 2022-01-20

## 2022-01-20 RX ORDER — IBUPROFEN 200 MG
400 TABLET ORAL EVERY 6 HOURS PRN
Status: ON HOLD | COMMUNITY
End: 2022-02-02

## 2022-01-20 RX ORDER — CHOLECALCIFEROL (VITAMIN D3) 125 MCG
5 CAPSULE ORAL NIGHTLY
Status: DISCONTINUED | OUTPATIENT
Start: 2022-01-20 | End: 2022-01-26

## 2022-01-20 RX ORDER — BISACODYL 10 MG
10 SUPPOSITORY, RECTAL RECTAL
Status: DISCONTINUED | OUTPATIENT
Start: 2022-01-20 | End: 2022-01-21

## 2022-01-20 RX ORDER — POLYETHYLENE GLYCOL 3350 17 G/17G
1 POWDER, FOR SOLUTION ORAL
Status: DISCONTINUED | OUTPATIENT
Start: 2022-01-20 | End: 2022-01-21

## 2022-01-20 RX ORDER — OXYCODONE HYDROCHLORIDE 5 MG/1
5 TABLET ORAL EVERY 4 HOURS PRN
Status: DISCONTINUED | OUTPATIENT
Start: 2022-01-20 | End: 2022-01-21

## 2022-01-20 RX ORDER — ATORVASTATIN CALCIUM 40 MG/1
40 TABLET, FILM COATED ORAL DAILY
COMMUNITY
End: 2023-07-31

## 2022-01-20 RX ORDER — M-VIT,TX,IRON,MINS/CALC/FOLIC 27MG-0.4MG
1 TABLET ORAL DAILY
COMMUNITY
End: 2022-02-22

## 2022-01-20 RX ORDER — ACETAMINOPHEN 325 MG/1
650 TABLET ORAL EVERY 6 HOURS PRN
Status: DISCONTINUED | OUTPATIENT
Start: 2022-01-20 | End: 2022-01-21

## 2022-01-20 RX ORDER — CALCIUM CARBONATE 300MG(750)
30 TABLET,CHEWABLE ORAL
Status: ON HOLD | COMMUNITY
End: 2022-02-02

## 2022-01-20 RX ORDER — FOLIC ACID 1 MG/1
1 TABLET ORAL DAILY
Status: DISCONTINUED | OUTPATIENT
Start: 2022-01-20 | End: 2022-02-02 | Stop reason: HOSPADM

## 2022-01-20 RX ADMIN — SENNOSIDES AND DOCUSATE SODIUM 2 TABLET: 50; 8.6 TABLET ORAL at 20:00

## 2022-01-20 RX ADMIN — OXYCODONE 5 MG: 5 TABLET ORAL at 00:41

## 2022-01-20 RX ADMIN — MORPHINE SULFATE 4 MG: 4 INJECTION INTRAVENOUS at 15:32

## 2022-01-20 RX ADMIN — MORPHINE SULFATE 4 MG: 4 INJECTION INTRAVENOUS at 11:24

## 2022-01-20 RX ADMIN — ACETAMINOPHEN 650 MG: 325 TABLET, FILM COATED ORAL at 22:49

## 2022-01-20 RX ADMIN — OXYCODONE 5 MG: 5 TABLET ORAL at 14:25

## 2022-01-20 RX ADMIN — MORPHINE SULFATE 4 MG: 4 INJECTION INTRAVENOUS at 01:54

## 2022-01-20 RX ADMIN — Medication 5 MG: at 22:47

## 2022-01-20 ASSESSMENT — PAIN SCALES - PAIN ASSESSMENT IN ADVANCED DEMENTIA (PAINAD)
FACIALEXPRESSION: SMILING OR INEXPRESSIVE
BREATHING: NORMAL
TOTALSCORE: 0
BODYLANGUAGE: RELAXED
CONSOLABILITY: NO NEED TO CONSOLE

## 2022-01-20 ASSESSMENT — ENCOUNTER SYMPTOMS
NAUSEA: 0
COUGH: 0
SORE THROAT: 0
HEADACHES: 0
FALLS: 1
VOMITING: 0
FEVER: 0
DIARRHEA: 0
ABDOMINAL PAIN: 0
SHORTNESS OF BREATH: 0
DIZZINESS: 0
CHILLS: 0

## 2022-01-20 ASSESSMENT — PAIN DESCRIPTION - PAIN TYPE
TYPE: ACUTE PAIN
TYPE: ACUTE PAIN

## 2022-01-20 NOTE — ED TRIAGE NOTES
"Chief Complaint   Patient presents with   • GLF     Pt BIB [facility] after falling down 1 stair. Questionable \"head bump\". -LOC. +asa   • Hip Pain     Pt complains of R hip pain following fall        Pt activated as TBI and brought to CT. Pt given 100 fentanyl by EMS prior to arrival   "

## 2022-01-20 NOTE — ED PROVIDER NOTES
"ED Provider Note    CHIEF COMPLAINT  Chief Complaint   Patient presents with   • GLF     Pt BIB [facility] after falling down 1 stair. Questionable \"head bump\". -LOC. +asa   • Hip Pain     Pt complains of R hip pain following fall        HPI  Navin Suazo is a 89 y.o. male hx CAD s/p stent, HTN, HLD who presents via EMS with right hip pain after falling down 1 stair.  Patient states he tripped and fell, denies any dizziness, lightheadedness, chest pain, shortness of breath.  States he landed on his right hip and has most pain there.  Has had difficulty bearing weight since the fall.  Does report hitting his head when he fell, but denies loss of consciousness or neck pain.  Reports mild left hip pain as well, but denies pain other than these 2 areas.  States he is able to feel me touching his foot, but states he cannot move his toes due to pain.  Denies history of prior hip surgeries.    REVIEW OF SYSTEMS  See HPI for further details. All other systems are negative.     PAST MEDICAL HISTORY   has a past medical history of Erectile dysfunction.    SOCIAL HISTORY  Social History     Tobacco Use   • Smoking status: Never Smoker   • Smokeless tobacco: Never Used   Substance and Sexual Activity   • Alcohol use: Yes     Alcohol/week: 0.0 oz   • Drug use: Not on file   • Sexual activity: Not on file       SURGICAL HISTORY   has a past surgical history that includes hernia repair and vasectomy.    CURRENT MEDICATIONS  Home Medications     Reviewed by Miles Barbosa (Pharmacy Tech) on 01/20/22 at 0005  Med List Status: Complete   Medication Last Dose Status   aspirin (ASA) 81 MG Chew Tab chewable tablet 1/19/2022 Active   atorvastatin (LIPITOR) 40 MG Tab 1/20/2022 Active   Glucosamine HCl (GLUCOSAMINE PO) 1/20/2022 Active   ibuprofen (MOTRIN) 200 MG Tab 1/19/2022 Active   Melatonin 10 MG TABLET DISPERSIBLE 1/19/2022 Active   therapeutic multivitamin-minerals (THERAGRAN-M) Tab 1/20/2022 Active          " "      ALLERGIES  No Known Allergies    PHYSICAL EXAM  VITAL SIGNS: /92   Pulse 97   Temp 36.6 °C (97.8 °F) (Temporal)   Resp 19   Ht 1.803 m (5' 11\")   Wt 90.7 kg (200 lb)   SpO2 96%   BMI 27.89 kg/m²     Pulse ox interpretation: I interpret this pulse ox as normal.  Constitutional: Alert in no apparent distress.  Elderly male who appears stated age and pleasant  HENT: No signs of trauma, Bilateral external ears normal, Nose normal.  Hard of hearing  Eyes: Pupils are equal and reactive, Conjunctiva normal, Non-icteric.   Neck: Normal range of motion, No tenderness, Supple, No stridor.  No C-spine tenderness  Cardiovascular: Regular rate and rhythm, no murmurs.   Thorax & Lungs: Normal breath sounds, No respiratory distress, No wheezing, No chest tenderness.   Abdomen: Soft, No tenderness, No masses, No pulsatile masses. No peritoneal signs.  Skin: Warm, Dry, No erythema, No rash.   Extremities: Intact distal pulses, No edema, No tenderness, No cyanosis.  Musculoskeletal: Decreased range of motion at right hip, pain with any attempts at range of motion.  Intact sensation distally.  Mild tenderness over left hip, but no pain in left hip with range of motion of left leg.  No tenderness of bilateral upper extremities, clavicles, chest wall.  Neurologic: Alert , Normal motor function, Normal sensory function, No focal deficits noted.   Psychiatric: Affect normal, Judgment normal, Mood normal.       DIAGNOSTIC STUDIES / PROCEDURES      LABS  Results for orders placed or performed during the hospital encounter of 01/19/22   CBC WITH DIFFERENTIAL   Result Value Ref Range    WBC 9.0 4.8 - 10.8 K/uL    RBC 4.31 (L) 4.70 - 6.10 M/uL    Hemoglobin 14.6 14.0 - 18.0 g/dL    Hematocrit 43.5 42.0 - 52.0 %    .9 (H) 81.4 - 97.8 fL    MCH 33.9 (H) 27.0 - 33.0 pg    MCHC 33.6 (L) 33.7 - 35.3 g/dL    RDW 46.4 35.9 - 50.0 fL    Platelet Count 151 (L) 164 - 446 K/uL    MPV 9.7 9.0 - 12.9 fL    Neutrophils-Polys 78.60 " (H) 44.00 - 72.00 %    Lymphocytes 11.10 (L) 22.00 - 41.00 %    Monocytes 8.80 0.00 - 13.40 %    Eosinophils 0.90 0.00 - 6.90 %    Basophils 0.30 0.00 - 1.80 %    Immature Granulocytes 0.30 0.00 - 0.90 %    Nucleated RBC 0.00 /100 WBC    Neutrophils (Absolute) 7.08 1.82 - 7.42 K/uL    Lymphs (Absolute) 1.00 1.00 - 4.80 K/uL    Monos (Absolute) 0.79 0.00 - 0.85 K/uL    Eos (Absolute) 0.08 0.00 - 0.51 K/uL    Baso (Absolute) 0.03 0.00 - 0.12 K/uL    Immature Granulocytes (abs) 0.03 0.00 - 0.11 K/uL    NRBC (Absolute) 0.00 K/uL   BASIC METABOLIC PANEL   Result Value Ref Range    Sodium 139 135 - 145 mmol/L    Potassium 4.4 3.6 - 5.5 mmol/L    Chloride 105 96 - 112 mmol/L    Co2 20 20 - 33 mmol/L    Glucose 108 (H) 65 - 99 mg/dL    Bun 26 (H) 8 - 22 mg/dL    Creatinine 1.21 0.50 - 1.40 mg/dL    Calcium 9.2 8.5 - 10.5 mg/dL    Anion Gap 14.0 7.0 - 16.0   SARS-COV Antigen MATILDE: Collect dry nasal swab   Result Value Ref Range    SARS-CoV-2 Source Nasal Swab     SARS-COV ANTIGEN MATILDE NotDetected NotDetected   ESTIMATED GFR   Result Value Ref Range    GFR If African American >60 >60 mL/min/1.73 m 2    GFR If Non  56 (A) >60 mL/min/1.73 m 2         RADIOLOGY  DX-CHEST-LIMITED (1 VIEW)   Final Result      1.  Cardiomegaly.   2.  Slight interstitial prominence. No consolidation or pleural effusion.      DX-FEMUR-2+ RIGHT   Final Result      Mildly angulated and displaced transcervical right femoral neck fracture.      DX-PELVIS-1 OR 2 VIEWS   Final Result      Mildly displaced transcervical right femoral neck fracture.      CT-PELVIS W/O PLUS RECONS   Final Result      Mildly comminuted, angulated and displaced transcervical right femoral neck fracture.      CT-HEAD W/O   Final Result      1.  Chronic ischemic changes.   2.  No acute intracranial abnormality.         CT-CSPINE WITHOUT PLUS RECONS   Final Result      1.  No acute fracture or dislocation of the cervical spine.   2.  Mild degeneration.   3.  Severe  carotid calcified plaque.              COURSE & MEDICAL DECISION MAKING  Pertinent Labs & Imaging studies reviewed. (See chart for details)  11:34 PM  X-ray shows acute right femoral neck fracture, no injuries on the left.  Orthopedics and hospitalist christin.      Denisse 89-year-old male presented via EMS after mechanical fall from standing.  Only complaint of right hip pain.  Patient tripped and had no presyncopal episodes and has no chest pain or shortness of breath to me any concern for dysrhythmia, PE, or ACS.  On exam he has no evidence of injury apart from right hip pain and tenderness and pain with range of motion.  X-ray shows fracture of the right femoral neck which will require operative intervention.  He had has mild tenderness in the left, but no fractures seen on CT or x-ray.  CT head and C-spine were ordered given age and head trauma with fall and were negative for fracture or intracranial hemorrhage.  Basic preop labs were ordered.  Patient admitted to hospitalist, plan for OR 1/20 with ortho.         FINAL IMPRESSION  1. Closed fracture of right hip, initial encounter (HCC)           Electronically signed by: Rosette Gilbert M.D., 1/19/2022 10:40 PM

## 2022-01-20 NOTE — ED NOTES
Pt resting in gurney, pt reports pain starting to increase but denies need for pain medication at this time.  Call light in reach.

## 2022-01-20 NOTE — ED NOTES
Patient transported from CT. Dressed patient in gown. Placed on monitor. Placed all belongings in patient belongings bag. Provided blankets for comfort.

## 2022-01-20 NOTE — ASSESSMENT & PLAN NOTE
Mechanical fall down some stairs prior to admission.  CT pelvis shows right femoral neck fracture  S/p right hip fx repair on 1/21.   PT/OT has evaluate the patient, recommend postacute

## 2022-01-20 NOTE — CONSULTS
[facility] ORTHO TRAUMA    Ortho Trauma Consult Note    Assessment & Plan:  1) 89 y.o. male s/p ground-level fall who sustained right closed displaced femoral neck fracture.      Discussion of nonoperative and operative treatments occurred at length. I am recommending operative management. Goals of surgery would be to restore length, alignment, and rotation, improve mobility and function and decrease pain.    Risks, benefits, alternatives, and expected prognosis were discussed at length with the patient, who verbalized understanding.  Risks include, but are not limited to, pain, infection, bleeding, neurologic injury that may be permanent, hip instability, need for conversion to JASKARAN, the need for further surgery, reaction to the anesthetic, thromboembolic events, loss of limb, and even loss of life; he understood these risks and wished to proceed.     Patient and family also understand and agree to intraoperative clinical photographs and radiographs that may be taken and utilized for research and medical education in addition to routine clinical care.    We will proceed to the OR for right hip hemiarthroplasty with either myself, Dr. Roman, or Dr. House..    Patient is in agreement with the above-mentioned plan; all questions were answered to their apparent satisfaction.    Subjective     Chief Complaint: Right hip pain    History of Present Illness:  Navin Suazo was injured as a result of ground-level fall.  Was unable to ambulate and had severe right hip pain.  Denies other injuries.  Denies numbness/tingling in the extremity.   Has no other questions or concerns.      ROS: Negative otherwise than mentioned in HPI.    Past Medical History:   Diagnosis Date   • Erectile dysfunction        Family History:  Family History   Problem Relation Age of Onset   • Other Mother    • Other Father      No family history of DVT/PE. No family history of bleeding disorders.    Social History:   reports that he has never  "smoked. He has never used smokeless tobacco. He reports current alcohol use.     Patient has No Known Allergies.    Prior to Admission medications    Medication Sig Start Date End Date Taking? Authorizing Provider   atorvastatin (LIPITOR) 40 MG Tab Take 40 mg by mouth every day.   Yes Physician Outpatient   ibuprofen (MOTRIN) 200 MG Tab Take 400 mg by mouth every 6 hours as needed for Mild Pain or Inflammation.   Yes Physician Outpatient   Melatonin 10 MG TABLET DISPERSIBLE Take 30 mg by mouth at bedtime. 3 TABLETS = 30 MG   Yes Physician Outpatient   Glucosamine HCl (GLUCOSAMINE PO) Take 1 Tablet by mouth every day.   Yes Physician Outpatient   therapeutic multivitamin-minerals (THERAGRAN-M) Tab Take 1 Tablet by mouth every day.   Yes Physician Outpatient   aspirin (ASA) 81 MG Chew Tab chewable tablet Chew 81 mg every day.   Yes Nn Emergency Md Per Protocol, M.D.       Objective     Current Vital Signs:  /62   Pulse 81   Temp 36.6 °C (97.8 °F) (Temporal)   Resp 17   Ht 1.803 m (5' 11\")   Wt 90.7 kg (200 lb)   SpO2 96%   BMI 27.89 kg/m²     Physical Exam:  General: Awake, appropriate, cooperative, and in no acute distress  HEENT: Normocephalic, atraumatic  CV: Equal peripheral pulses  Resp: Equal chest rise bilaterally  GI: Abdomen soft, nontender, no rebound, no guarding  Neuro: Cranial nerves II-XII grossly intact  Psych: Alert and oriented x3, good insight and judgement  Extremities:   Right lower extremity: Shortened externally rotated right lower extremity, positive logroll, neurovascular tact distally.    Data Review: labs pending    Imaging: Radiographs of the pelvis right hip demonstrate a closed displaced femoral neck fracture.    Paul Graves M.D.  Date: 1/20/2022  Time: 9:37 AM  "

## 2022-01-20 NOTE — PROGRESS NOTES
[facility] Medicine Daily Progress Note    Date of Service  1/20/2022    Chief Complaint  Navin Suazo is a 89 y.o. male admitted 1/19/2022 with right hip pain    Hospital Course  Navin Suazo is a 89 y.o. male who presented 1/19/2022 with fall.  PMH of dyslipidemia, CKD 3.  Patient tripped down some stairs, fell down on the last step and hit the concrete on the right side earlier this evening.  Did not has hit his head, denies LOC, most of the pain is on the right way he also has some left hip pain as well.  CT head, C-spine with no acute abnormality.  CT pelvis shows angulated and displaced transcervical right femoral neck fracture.  EDP spoke with Ortho who will evaluate the patient for surgery tomorrow.    Interval Problem Update  Patient reports that he has right hip pain that is slightly improved with pain meds and is better with rest.  Case was discussed with orthopedic surgery.  They are reporting that they may consider operating this afternoon or tomorrow.  For now n.p.o.  Vitals are stable.    I have personally seen and examined the patient at bedside. I discussed the plan of care with patient, bedside RN and orthopedics.    Consultants/Specialty  orthopedics    Code Status  Full Code    Disposition  Patient is not medically cleared for discharge.   Anticipate discharge to to skilled nursing facility.  I have placed the appropriate orders for post-discharge needs.    Review of Systems  Review of Systems   Constitutional: Negative for chills and fever.   Respiratory: Negative for shortness of breath.    Cardiovascular: Negative for chest pain and leg swelling.   Gastrointestinal: Negative for abdominal pain, nausea and vomiting.   Genitourinary: Negative for dysuria.   Musculoskeletal:        Right hip pain   All other systems reviewed and are negative.       Physical Exam  Temp:  [36.6 °C (97.8 °F)] 36.6 °C (97.8 °F)  Pulse:  [64-97] 64  Resp:  [14-22] 16  BP: (111-172)/() 119/57  SpO2:   [87 %-99 %] 93 %    Physical Exam  Vitals and nursing note reviewed.   Constitutional:       General: He is not in acute distress.     Appearance: Normal appearance.   HENT:      Head: Normocephalic and atraumatic.   Eyes:      General: No scleral icterus.        Right eye: No discharge.         Left eye: No discharge.   Cardiovascular:      Rate and Rhythm: Normal rate and regular rhythm.      Heart sounds: Normal heart sounds.   Pulmonary:      Effort: No respiratory distress.      Breath sounds: Normal breath sounds. No wheezing or rales.   Abdominal:      General: Abdomen is flat. Bowel sounds are normal. There is no distension.      Tenderness: There is no abdominal tenderness.   Musculoskeletal:         General: Tenderness (Right) present.      Right lower leg: No edema.      Left lower leg: No edema.      Comments: Decreased range of motion of right hip   Neurological:      Mental Status: He is alert and oriented to person, place, and time.      Cranial Nerves: No cranial nerve deficit.   Psychiatric:         Mood and Affect: Mood normal.         Thought Content: Thought content normal.         Judgment: Judgment normal.         Fluids  No intake or output data in the 24 hours ending 01/20/22 1246    Laboratory  Recent Labs     01/19/22  2329   WBC 9.0   RBC 4.31*   HEMOGLOBIN 14.6   HEMATOCRIT 43.5   .9*   MCH 33.9*   MCHC 33.6*   RDW 46.4   PLATELETCT 151*   MPV 9.7     Recent Labs     01/19/22  2329   SODIUM 139   POTASSIUM 4.4   CHLORIDE 105   CO2 20   GLUCOSE 108*   BUN 26*   CREATININE 1.21   CALCIUM 9.2                   Imaging  DX-CHEST-LIMITED (1 VIEW)   Final Result      1.  Cardiomegaly.   2.  Slight interstitial prominence. No consolidation or pleural effusion.      DX-FEMUR-2+ RIGHT   Final Result      Mildly angulated and displaced transcervical right femoral neck fracture.      DX-PELVIS-1 OR 2 VIEWS   Final Result      Mildly displaced transcervical right femoral neck fracture.       CT-PELVIS W/O PLUS RECONS   Final Result      Mildly comminuted, angulated and displaced transcervical right femoral neck fracture.      CT-HEAD W/O   Final Result      1.  Chronic ischemic changes.   2.  No acute intracranial abnormality.         CT-CSPINE WITHOUT PLUS RECONS   Final Result      1.  No acute fracture or dislocation of the cervical spine.   2.  Mild degeneration.   3.  Severe carotid calcified plaque.           Assessment/Plan  * Closed right hip fracture, initial encounter (HCC)- (present on admission)  Assessment & Plan  Mechanical fall down some stairs prior to admission.  CT pelvis shows right femoral neck fracture  EDP spoke with Ortho evaluate patient for surgery tomorrow  N.p.o., pain management  PT, OT  Surgery will be later today or tomorrow per orthopedic surgery    CAD (coronary artery disease), native coronary artery  Assessment & Plan  From chart review patient appears of had CAD with a stent placed in the past.  Unclear when this was placed however greater than 1 year ago.  Continue atorvastatin  Postoperatively would benefit from starting aspirin.    Elevated MCV  Assessment & Plan  .9.  B12 and folate ordered  TSH level    Stage 3a chronic kidney disease (HCC)- (present on admission)  Assessment & Plan  History of CKD, labs pending  Avoid nephrotoxic agents    Mixed hyperlipidemia- (present on admission)  Assessment & Plan  Continue statin    Insomnia- (present on admission)  Assessment & Plan  Continue melatonin       VTE prophylaxis: pharmacologic prophylaxis contraindicated due to Pending surgery    I have performed a physical exam and reviewed and updated ROS and Plan today (1/20/2022). In review of yesterday's note (1/19/2022), there are no changes except as documented above.

## 2022-01-20 NOTE — ED NOTES
Report rec'd from JUDY Bishop.  Pt resting in [facility], no needs or complaints voiced at this time time.   Call light in reach.

## 2022-01-20 NOTE — ASSESSMENT & PLAN NOTE
From chart review patient appears of had CAD with a stent placed in the past.  Unclear when this was placed however greater than 1 year ago.  Continue home atorvastatin.  Continue home ASA on discharge.

## 2022-01-20 NOTE — ED NOTES
Pt medicated for pain per MAR. Notified [facility] of order of 30 mg of melatonin is a high dose. MD states do not administer, New orders for Melatonin 5mg nightly instead.

## 2022-01-20 NOTE — H&P
"Hospital Medicine History & Physical Note    Date of Service  1/19/2022    Primary Care Physician  Karthikeyan Chaudhry M.D.    Consultants  orthopedics    Code Status  Full Code    Chief Complaint  Chief Complaint   Patient presents with   • GLF     Pt BIB [facility] after falling down 1 stair. Questionable \"head bump\". -LOC. +asa   • Hip Pain     Pt complains of R hip pain following fall        History of Presenting Illness  Navin Suazo is a 89 y.o. male who presented 1/19/2022 with fall.  PMH of dyslipidemia, CKD 3.  Patient tripped down some stairs, fell down on the last step and hit the concrete on the right side earlier this evening.  Did not has hit his head, denies LOC, most of the pain is on the right way he also has some left hip pain as well.  CT head, C-spine with no acute abnormality.  CT pelvis shows angulated and displaced transcervical right femoral neck fracture.  EDP spoke with Ortho who will evaluate the patient for surgery tomorrow.    I discussed the plan of care with patient.    Review of Systems  Review of Systems   Constitutional: Negative for chills and fever.   HENT: Negative for sore throat.    Respiratory: Negative for cough and shortness of breath.    Cardiovascular: Negative for chest pain.   Gastrointestinal: Negative for abdominal pain, diarrhea, nausea and vomiting.   Genitourinary: Negative for dysuria and urgency.   Musculoskeletal: Positive for falls and joint pain.   Neurological: Negative for dizziness and headaches.   All other systems reviewed and are negative.      Past Medical History   has a past medical history of Erectile dysfunction.    Surgical History   has a past surgical history that includes hernia repair and vasectomy.     Family History  family history includes Other in his father and mother.   Family history reviewed with patient. There is no family history that is pertinent to the chief complaint.     Social History   reports that he has never smoked. He has never " used smokeless tobacco. He reports current alcohol use.    Allergies  No Known Allergies    Medications  Prior to Admission Medications   Prescriptions Last Dose Informant Patient Reported? Taking?   Glucosamine HCl (GLUCOSAMINE PO) 1/20/2022 at 1200 Patient Yes Yes   Sig: Take 1 Tablet by mouth every day.   Melatonin 10 MG TABLET DISPERSIBLE 1/19/2022 at PM Patient Yes Yes   Sig: Take 30 mg by mouth at bedtime. 3 TABLETS = 30 MG   aspirin (ASA) 81 MG Chew Tab chewable tablet 1/19/2022 at 2000 Patient Yes Yes   Sig: Chew 81 mg every day.   atorvastatin (LIPITOR) 40 MG Tab 1/20/2022 at 1200 Patient Yes Yes   Sig: Take 40 mg by mouth every day.   ibuprofen (MOTRIN) 200 MG Tab 1/19/2022 at 0000 Patient Yes Yes   Sig: Take 400 mg by mouth every 6 hours as needed for Mild Pain or Inflammation.   therapeutic multivitamin-minerals (THERAGRAN-M) Tab 1/20/2022 at 1200 Patient Yes Yes   Sig: Take 1 Tablet by mouth every day.      Facility-Administered Medications: None       Physical Exam  Temp:  [36.6 °C (97.8 °F)] 36.6 °C (97.8 °F)  Pulse:  [75-93] 93  Resp:  [22] 22  BP: (168)/(101) 168/101  SpO2:  [93 %-99 %] 93 %  Blood Pressure : (!) 168/101   Temperature: 36.6 °C (97.8 °F)   Pulse: 93   Respiration: (!) 22   Pulse Oximetry: 93 %       Physical Exam  Constitutional:       Appearance: Normal appearance.   HENT:      Head: Normocephalic and atraumatic.      Mouth/Throat:      Mouth: Mucous membranes are moist.      Pharynx: Oropharynx is clear. No oropharyngeal exudate or posterior oropharyngeal erythema.   Eyes:      General: No scleral icterus.  Cardiovascular:      Rate and Rhythm: Normal rate and regular rhythm.      Pulses: Normal pulses.      Heart sounds: Normal heart sounds.   Pulmonary:      Effort: Pulmonary effort is normal. No respiratory distress.      Breath sounds: Normal breath sounds. No wheezing.   Abdominal:      Palpations: Abdomen is soft.      Tenderness: There is no abdominal tenderness.    Musculoskeletal:      Cervical back: Normal range of motion.      Comments: Right lower extremity pain and decreased range of motion   Skin:     General: Skin is warm and dry.   Neurological:      General: No focal deficit present.      Mental Status: He is alert and oriented to person, place, and time. Mental status is at baseline.   Psychiatric:         Mood and Affect: Mood normal.         Laboratory:  Recent Labs     01/19/22  2329   WBC 9.0   RBC 4.31*   HEMOGLOBIN 14.6   HEMATOCRIT 43.5   .9*   MCH 33.9*   MCHC 33.6*   RDW 46.4   PLATELETCT 151*   MPV 9.7         No results for input(s): ALTSGPT, ASTSGOT, ALKPHOSPHAT, TBILIRUBIN, DBILIRUBIN, GAMMAGT, AMYLASE, LIPASE, ALB, PREALBUMIN, GLUCOSE in the last 72 hours.      No results for input(s): NTPROBNP in the last 72 hours.      No results for input(s): TROPONINT in the last 72 hours.    Imaging:  DX-CHEST-LIMITED (1 VIEW)   Final Result      1.  Cardiomegaly.   2.  Slight interstitial prominence. No consolidation or pleural effusion.      DX-FEMUR-2+ RIGHT   Final Result      Mildly angulated and displaced transcervical right femoral neck fracture.      DX-PELVIS-1 OR 2 VIEWS   Final Result      Mildly displaced transcervical right femoral neck fracture.      CT-PELVIS W/O PLUS RECONS   Final Result      Mildly comminuted, angulated and displaced transcervical right femoral neck fracture.      CT-HEAD W/O   Final Result      1.  Chronic ischemic changes.   2.  No acute intracranial abnormality.         CT-CSPINE WITHOUT PLUS RECONS   Final Result      1.  No acute fracture or dislocation of the cervical spine.   2.  Mild degeneration.   3.  Severe carotid calcified plaque.          X-Ray:  I have personally reviewed the images and compared with prior images.    Assessment/Plan:  I anticipate this patient will require at least two midnights for appropriate medical management, necessitating inpatient admission.    * Closed right hip fracture, initial  encounter (HCC)- (present on admission)  Assessment & Plan  Mechanical fall down some stairs prior to admission.  CT pelvis shows right femoral neck fracture  EDP spoke with Ortho evaluate patient for surgery tomorrow  N.p.o., pain management  PT, OT    Stage 3a chronic kidney disease (HCC)- (present on admission)  Assessment & Plan  History of CKD, labs pending  Avoid nephrotoxic agents    Mixed hyperlipidemia- (present on admission)  Assessment & Plan  Continue statin    Insomnia- (present on admission)  Assessment & Plan  Continue melatonin      VTE prophylaxis: pharmacologic prophylaxis contraindicated due to surgery

## 2022-01-21 ENCOUNTER — ANESTHESIA EVENT (OUTPATIENT)
Dept: SURGERY | Facility: MEDICAL CENTER | Age: 87
DRG: 521 | End: 2022-01-21
Payer: MEDICARE

## 2022-01-21 ENCOUNTER — ANESTHESIA (OUTPATIENT)
Dept: SURGERY | Facility: MEDICAL CENTER | Age: 87
DRG: 521 | End: 2022-01-21
Payer: MEDICARE

## 2022-01-21 LAB
ANION GAP SERPL CALC-SCNC: 14 MMOL/L (ref 7–16)
BUN SERPL-MCNC: 19 MG/DL (ref 8–22)
CALCIUM SERPL-MCNC: 8.4 MG/DL (ref 8.5–10.5)
CHLORIDE SERPL-SCNC: 105 MMOL/L (ref 96–112)
CO2 SERPL-SCNC: 20 MMOL/L (ref 20–33)
CREAT SERPL-MCNC: 1 MG/DL (ref 0.5–1.4)
EKG IMPRESSION: NORMAL
ERYTHROCYTE [DISTWIDTH] IN BLOOD BY AUTOMATED COUNT: 45.9 FL (ref 35.9–50)
GLUCOSE SERPL-MCNC: 114 MG/DL (ref 65–99)
HCT VFR BLD AUTO: 45.7 % (ref 42–52)
HGB BLD-MCNC: 15.7 G/DL (ref 14–18)
MCH RBC QN AUTO: 35 PG (ref 27–33)
MCHC RBC AUTO-ENTMCNC: 34.4 G/DL (ref 33.7–35.3)
MCV RBC AUTO: 102 FL (ref 81.4–97.8)
PLATELET # BLD AUTO: 151 K/UL (ref 164–446)
PMV BLD AUTO: 9.7 FL (ref 9–12.9)
POTASSIUM SERPL-SCNC: 3.9 MMOL/L (ref 3.6–5.5)
RBC # BLD AUTO: 4.48 M/UL (ref 4.7–6.1)
SODIUM SERPL-SCNC: 139 MMOL/L (ref 135–145)
TSH SERPL DL<=0.005 MIU/L-ACNC: 2.16 UIU/ML (ref 0.38–5.33)
WBC # BLD AUTO: 11 K/UL (ref 4.8–10.8)

## 2022-01-21 PROCEDURE — 700102 HCHG RX REV CODE 250 W/ 637 OVERRIDE(OP): Performed by: ORTHOPAEDIC SURGERY

## 2022-01-21 PROCEDURE — 27236 TREAT THIGH FRACTURE: CPT | Mod: RT | Performed by: ORTHOPAEDIC SURGERY

## 2022-01-21 PROCEDURE — 700111 HCHG RX REV CODE 636 W/ 250 OVERRIDE (IP): Performed by: ORTHOPAEDIC SURGERY

## 2022-01-21 PROCEDURE — A9270 NON-COVERED ITEM OR SERVICE: HCPCS | Performed by: STUDENT IN AN ORGANIZED HEALTH CARE EDUCATION/TRAINING PROGRAM

## 2022-01-21 PROCEDURE — 93010 ELECTROCARDIOGRAM REPORT: CPT | Performed by: INTERNAL MEDICINE

## 2022-01-21 PROCEDURE — 84443 ASSAY THYROID STIM HORMONE: CPT

## 2022-01-21 PROCEDURE — A9270 NON-COVERED ITEM OR SERVICE: HCPCS | Performed by: ORTHOPAEDIC SURGERY

## 2022-01-21 PROCEDURE — 700102 HCHG RX REV CODE 250 W/ 637 OVERRIDE(OP): Performed by: STUDENT IN AN ORGANIZED HEALTH CARE EDUCATION/TRAINING PROGRAM

## 2022-01-21 PROCEDURE — 700101 HCHG RX REV CODE 250: Performed by: ORTHOPAEDIC SURGERY

## 2022-01-21 PROCEDURE — 160031 HCHG SURGERY MINUTES - 1ST 30 MINS LEVEL 5: Performed by: ORTHOPAEDIC SURGERY

## 2022-01-21 PROCEDURE — 700111 HCHG RX REV CODE 636 W/ 250 OVERRIDE (IP): Performed by: ANESTHESIOLOGY

## 2022-01-21 PROCEDURE — 501838 HCHG SUTURE GENERAL: Performed by: ORTHOPAEDIC SURGERY

## 2022-01-21 PROCEDURE — 770001 HCHG ROOM/CARE - MED/SURG/GYN PRIV*

## 2022-01-21 PROCEDURE — 27236 TREAT THIGH FRACTURE: CPT | Mod: ASROC,RT | Performed by: PHYSICIAN ASSISTANT

## 2022-01-21 PROCEDURE — 160042 HCHG SURGERY MINUTES - EA ADDL 1 MIN LEVEL 5: Performed by: ORTHOPAEDIC SURGERY

## 2022-01-21 PROCEDURE — 36415 COLL VENOUS BLD VENIPUNCTURE: CPT

## 2022-01-21 PROCEDURE — 160035 HCHG PACU - 1ST 60 MINS PHASE I: Performed by: ORTHOPAEDIC SURGERY

## 2022-01-21 PROCEDURE — 700101 HCHG RX REV CODE 250: Performed by: ANESTHESIOLOGY

## 2022-01-21 PROCEDURE — 160048 HCHG OR STATISTICAL LEVEL 1-5: Performed by: ORTHOPAEDIC SURGERY

## 2022-01-21 PROCEDURE — 160009 HCHG ANES TIME/MIN: Performed by: ORTHOPAEDIC SURGERY

## 2022-01-21 PROCEDURE — 80048 BASIC METABOLIC PNL TOTAL CA: CPT

## 2022-01-21 PROCEDURE — 85027 COMPLETE CBC AUTOMATED: CPT

## 2022-01-21 PROCEDURE — 93005 ELECTROCARDIOGRAM TRACING: CPT | Performed by: ORTHOPAEDIC SURGERY

## 2022-01-21 PROCEDURE — 160002 HCHG RECOVERY MINUTES (STAT): Performed by: ORTHOPAEDIC SURGERY

## 2022-01-21 PROCEDURE — L8699 PROSTHETIC IMPLANT NOS: HCPCS | Performed by: ORTHOPAEDIC SURGERY

## 2022-01-21 PROCEDURE — 502000 HCHG MISC OR IMPLANTS RC 0278: Performed by: ORTHOPAEDIC SURGERY

## 2022-01-21 PROCEDURE — 700111 HCHG RX REV CODE 636 W/ 250 OVERRIDE (IP): Performed by: STUDENT IN AN ORGANIZED HEALTH CARE EDUCATION/TRAINING PROGRAM

## 2022-01-21 DEVICE — BONE CEMENT SIMPLEX ANTIBIO - (10/PK): Type: IMPLANTABLE DEVICE | Site: HIP | Status: FUNCTIONAL

## 2022-01-21 DEVICE — IMPLANTABLE DEVICE: Type: IMPLANTABLE DEVICE | Site: HIP | Status: FUNCTIONAL

## 2022-01-21 RX ORDER — ONDANSETRON 2 MG/ML
INJECTION INTRAMUSCULAR; INTRAVENOUS PRN
Status: DISCONTINUED | OUTPATIENT
Start: 2022-01-21 | End: 2022-01-23 | Stop reason: SURG

## 2022-01-21 RX ORDER — ONDANSETRON 2 MG/ML
4 INJECTION INTRAMUSCULAR; INTRAVENOUS EVERY 4 HOURS PRN
Status: DISCONTINUED | OUTPATIENT
Start: 2022-01-21 | End: 2022-02-02 | Stop reason: HOSPADM

## 2022-01-21 RX ORDER — ACETAMINOPHEN 325 MG/1
650 TABLET ORAL EVERY 6 HOURS PRN
Status: DISCONTINUED | OUTPATIENT
Start: 2022-01-26 | End: 2022-02-02 | Stop reason: HOSPADM

## 2022-01-21 RX ORDER — POLYETHYLENE GLYCOL 3350 17 G/17G
1 POWDER, FOR SOLUTION ORAL 2 TIMES DAILY PRN
Status: DISCONTINUED | OUTPATIENT
Start: 2022-01-21 | End: 2022-02-02 | Stop reason: HOSPADM

## 2022-01-21 RX ORDER — SCOLOPAMINE TRANSDERMAL SYSTEM 1 MG/1
1 PATCH, EXTENDED RELEASE TRANSDERMAL
Status: DISCONTINUED | OUTPATIENT
Start: 2022-01-21 | End: 2022-02-02 | Stop reason: HOSPADM

## 2022-01-21 RX ORDER — KETOROLAC TROMETHAMINE 30 MG/ML
15 INJECTION, SOLUTION INTRAMUSCULAR; INTRAVENOUS EVERY 6 HOURS
Status: DISPENSED | OUTPATIENT
Start: 2022-01-21 | End: 2022-01-24

## 2022-01-21 RX ORDER — OXYCODONE HCL 5 MG/5 ML
10 SOLUTION, ORAL ORAL
Status: DISCONTINUED | OUTPATIENT
Start: 2022-01-21 | End: 2022-01-21 | Stop reason: HOSPADM

## 2022-01-21 RX ORDER — OXYCODONE HCL 5 MG/5 ML
5 SOLUTION, ORAL ORAL
Status: DISCONTINUED | OUTPATIENT
Start: 2022-01-21 | End: 2022-01-21 | Stop reason: HOSPADM

## 2022-01-21 RX ORDER — DIPHENHYDRAMINE HYDROCHLORIDE 50 MG/ML
25 INJECTION INTRAMUSCULAR; INTRAVENOUS EVERY 6 HOURS PRN
Status: DISCONTINUED | OUTPATIENT
Start: 2022-01-21 | End: 2022-02-02 | Stop reason: HOSPADM

## 2022-01-21 RX ORDER — DOCUSATE SODIUM 100 MG/1
100 CAPSULE, LIQUID FILLED ORAL 2 TIMES DAILY
Status: DISCONTINUED | OUTPATIENT
Start: 2022-01-21 | End: 2022-02-02 | Stop reason: HOSPADM

## 2022-01-21 RX ORDER — LABETALOL HYDROCHLORIDE 5 MG/ML
5 INJECTION, SOLUTION INTRAVENOUS
Status: DISCONTINUED | OUTPATIENT
Start: 2022-01-21 | End: 2022-01-21 | Stop reason: HOSPADM

## 2022-01-21 RX ORDER — DEXAMETHASONE SODIUM PHOSPHATE 4 MG/ML
4 INJECTION, SOLUTION INTRA-ARTICULAR; INTRALESIONAL; INTRAMUSCULAR; INTRAVENOUS; SOFT TISSUE
Status: DISCONTINUED | OUTPATIENT
Start: 2022-01-21 | End: 2022-01-31

## 2022-01-21 RX ORDER — HYDROMORPHONE HYDROCHLORIDE 1 MG/ML
0.2 INJECTION, SOLUTION INTRAMUSCULAR; INTRAVENOUS; SUBCUTANEOUS
Status: DISCONTINUED | OUTPATIENT
Start: 2022-01-21 | End: 2022-01-21 | Stop reason: HOSPADM

## 2022-01-21 RX ORDER — ONDANSETRON 2 MG/ML
4 INJECTION INTRAMUSCULAR; INTRAVENOUS
Status: DISCONTINUED | OUTPATIENT
Start: 2022-01-21 | End: 2022-01-21 | Stop reason: HOSPADM

## 2022-01-21 RX ORDER — DEXAMETHASONE SODIUM PHOSPHATE 4 MG/ML
INJECTION, SOLUTION INTRA-ARTICULAR; INTRALESIONAL; INTRAMUSCULAR; INTRAVENOUS; SOFT TISSUE PRN
Status: DISCONTINUED | OUTPATIENT
Start: 2022-01-21 | End: 2022-01-23 | Stop reason: SURG

## 2022-01-21 RX ORDER — AMOXICILLIN 250 MG
1 CAPSULE ORAL NIGHTLY
Status: DISCONTINUED | OUTPATIENT
Start: 2022-01-21 | End: 2022-02-02 | Stop reason: HOSPADM

## 2022-01-21 RX ORDER — ACETAMINOPHEN 325 MG/1
650 TABLET ORAL EVERY 6 HOURS
Status: DISPENSED | OUTPATIENT
Start: 2022-01-21 | End: 2022-01-26

## 2022-01-21 RX ORDER — HYDROMORPHONE HYDROCHLORIDE 1 MG/ML
0.1 INJECTION, SOLUTION INTRAMUSCULAR; INTRAVENOUS; SUBCUTANEOUS
Status: DISCONTINUED | OUTPATIENT
Start: 2022-01-21 | End: 2022-01-21 | Stop reason: HOSPADM

## 2022-01-21 RX ORDER — IBUPROFEN 800 MG/1
800 TABLET ORAL 3 TIMES DAILY PRN
Status: DISCONTINUED | OUTPATIENT
Start: 2022-01-24 | End: 2022-01-31

## 2022-01-21 RX ORDER — BISACODYL 10 MG
10 SUPPOSITORY, RECTAL RECTAL
Status: DISCONTINUED | OUTPATIENT
Start: 2022-01-21 | End: 2022-02-02 | Stop reason: HOSPADM

## 2022-01-21 RX ORDER — CEFAZOLIN SODIUM 1 G/3ML
INJECTION, POWDER, FOR SOLUTION INTRAMUSCULAR; INTRAVENOUS PRN
Status: DISCONTINUED | OUTPATIENT
Start: 2022-01-21 | End: 2022-01-23 | Stop reason: SURG

## 2022-01-21 RX ORDER — ENEMA 19; 7 G/133ML; G/133ML
1 ENEMA RECTAL
Status: DISCONTINUED | OUTPATIENT
Start: 2022-01-21 | End: 2022-02-02 | Stop reason: HOSPADM

## 2022-01-21 RX ORDER — HALOPERIDOL 5 MG/ML
1 INJECTION INTRAMUSCULAR
Status: DISCONTINUED | OUTPATIENT
Start: 2022-01-21 | End: 2022-01-21 | Stop reason: HOSPADM

## 2022-01-21 RX ORDER — OXYCODONE HYDROCHLORIDE 10 MG/1
10 TABLET ORAL
Status: DISCONTINUED | OUTPATIENT
Start: 2022-01-21 | End: 2022-01-24

## 2022-01-21 RX ORDER — HYDROMORPHONE HYDROCHLORIDE 1 MG/ML
0.5 INJECTION, SOLUTION INTRAMUSCULAR; INTRAVENOUS; SUBCUTANEOUS
Status: DISCONTINUED | OUTPATIENT
Start: 2022-01-21 | End: 2022-01-24

## 2022-01-21 RX ORDER — HALOPERIDOL 5 MG/ML
1 INJECTION INTRAMUSCULAR EVERY 6 HOURS PRN
Status: DISCONTINUED | OUTPATIENT
Start: 2022-01-21 | End: 2022-02-02 | Stop reason: HOSPADM

## 2022-01-21 RX ORDER — HYDROMORPHONE HYDROCHLORIDE 1 MG/ML
0.4 INJECTION, SOLUTION INTRAMUSCULAR; INTRAVENOUS; SUBCUTANEOUS
Status: DISCONTINUED | OUTPATIENT
Start: 2022-01-21 | End: 2022-01-21 | Stop reason: HOSPADM

## 2022-01-21 RX ORDER — DIPHENHYDRAMINE HYDROCHLORIDE 50 MG/ML
12.5 INJECTION INTRAMUSCULAR; INTRAVENOUS
Status: DISCONTINUED | OUTPATIENT
Start: 2022-01-21 | End: 2022-01-21 | Stop reason: HOSPADM

## 2022-01-21 RX ORDER — HYDRALAZINE HYDROCHLORIDE 20 MG/ML
5 INJECTION INTRAMUSCULAR; INTRAVENOUS
Status: DISCONTINUED | OUTPATIENT
Start: 2022-01-21 | End: 2022-01-21 | Stop reason: HOSPADM

## 2022-01-21 RX ORDER — AMOXICILLIN 250 MG
1 CAPSULE ORAL
Status: DISCONTINUED | OUTPATIENT
Start: 2022-01-21 | End: 2022-02-02 | Stop reason: HOSPADM

## 2022-01-21 RX ORDER — OXYCODONE HYDROCHLORIDE 5 MG/1
5 TABLET ORAL
Status: DISCONTINUED | OUTPATIENT
Start: 2022-01-21 | End: 2022-01-24

## 2022-01-21 RX ORDER — LIDOCAINE HYDROCHLORIDE 20 MG/ML
INJECTION, SOLUTION EPIDURAL; INFILTRATION; INTRACAUDAL; PERINEURAL PRN
Status: DISCONTINUED | OUTPATIENT
Start: 2022-01-21 | End: 2022-01-23 | Stop reason: SURG

## 2022-01-21 RX ORDER — PHENYLEPHRINE HCL IN 0.9% NACL 0.5 MG/5ML
SYRINGE (ML) INTRAVENOUS PRN
Status: DISCONTINUED | OUTPATIENT
Start: 2022-01-21 | End: 2022-01-23 | Stop reason: SURG

## 2022-01-21 RX ORDER — SODIUM CHLORIDE, SODIUM LACTATE, POTASSIUM CHLORIDE, CALCIUM CHLORIDE 600; 310; 30; 20 MG/100ML; MG/100ML; MG/100ML; MG/100ML
INJECTION, SOLUTION INTRAVENOUS CONTINUOUS
Status: DISCONTINUED | OUTPATIENT
Start: 2022-01-21 | End: 2022-01-21 | Stop reason: HOSPADM

## 2022-01-21 RX ADMIN — CEFAZOLIN 2 G: 330 INJECTION, POWDER, FOR SOLUTION INTRAMUSCULAR; INTRAVENOUS at 16:38

## 2022-01-21 RX ADMIN — DOCUSATE SODIUM 50 MG AND SENNOSIDES 8.6 MG 1 TABLET: 8.6; 5 TABLET, FILM COATED ORAL at 20:30

## 2022-01-21 RX ADMIN — LIDOCAINE HYDROCHLORIDE 50 MG: 20 INJECTION, SOLUTION EPIDURAL; INFILTRATION; INTRACAUDAL at 16:36

## 2022-01-21 RX ADMIN — Medication 5 MG: at 20:30

## 2022-01-21 RX ADMIN — CYANOCOBALAMIN TAB 500 MCG 1000 MCG: 500 TAB at 05:29

## 2022-01-21 RX ADMIN — DOCUSATE SODIUM 100 MG: 100 CAPSULE, LIQUID FILLED ORAL at 20:30

## 2022-01-21 RX ADMIN — ACETAMINOPHEN 650 MG: 325 TABLET, FILM COATED ORAL at 20:30

## 2022-01-21 RX ADMIN — ATORVASTATIN CALCIUM 40 MG: 40 TABLET, FILM COATED ORAL at 05:28

## 2022-01-21 RX ADMIN — FENTANYL CITRATE 50 MCG: 50 INJECTION, SOLUTION INTRAMUSCULAR; INTRAVENOUS at 16:59

## 2022-01-21 RX ADMIN — KETOROLAC TROMETHAMINE 15 MG: 30 INJECTION, SOLUTION INTRAMUSCULAR at 20:31

## 2022-01-21 RX ADMIN — DEXAMETHASONE SODIUM PHOSPHATE 4 MG: 4 INJECTION, SOLUTION INTRA-ARTICULAR; INTRALESIONAL; INTRAMUSCULAR; INTRAVENOUS; SOFT TISSUE at 16:38

## 2022-01-21 RX ADMIN — FENTANYL CITRATE 50 MCG: 50 INJECTION, SOLUTION INTRAMUSCULAR; INTRAVENOUS at 16:50

## 2022-01-21 RX ADMIN — Medication 100 MCG: at 16:38

## 2022-01-21 RX ADMIN — MORPHINE SULFATE 4 MG: 4 INJECTION INTRAVENOUS at 10:54

## 2022-01-21 RX ADMIN — WATER 2 G: 100 INJECTION, SOLUTION INTRAVENOUS at 20:31

## 2022-01-21 RX ADMIN — PROPOFOL 100 MG: 10 INJECTION, EMULSION INTRAVENOUS at 16:36

## 2022-01-21 RX ADMIN — FOLIC ACID 1 MG: 1 TABLET ORAL at 05:28

## 2022-01-21 RX ADMIN — FENTANYL CITRATE 50 MCG: 50 INJECTION, SOLUTION INTRAMUSCULAR; INTRAVENOUS at 16:36

## 2022-01-21 RX ADMIN — ONDANSETRON 4 MG: 2 INJECTION INTRAMUSCULAR; INTRAVENOUS at 16:38

## 2022-01-21 ASSESSMENT — PAIN DESCRIPTION - PAIN TYPE
TYPE: SURGICAL PAIN
TYPE: SURGICAL PAIN
TYPE: ACUTE PAIN
TYPE: SURGICAL PAIN
TYPE: ACUTE PAIN

## 2022-01-21 NOTE — PROGRESS NOTES
4 Eyes Skin Assessment Completed by JUDY Lopez and JUDY Wolfe.    Head Bruising and Redness   Ears WDL  Nose WDL  Mouth Redness   Bruising left lower lip  Neck WDL  Breast/Chest WDL  Shoulder Blades WDL  Spine WDL  (R) Arm/Elbow/Hand Abrasion and Scab  (L) Arm/Elbow/Hand Abrasion and Scab  Abdomen WDL  Groin WDL  Scrotum/Coccyx/Buttocks   Unable to assess due to patient unable to roll onto side for assessment  (R) Leg WDL  (L) Leg WDL  (R) Heel/Foot/Toe WDL  (L) Heel/Foot/Toe Redness and Scab          Devices In Places Pulse Ox, SCD's and Nasal Cannula      Interventions In Place Gray Ear Foams and Pillows    Possible Skin Injury Yes    Pictures Uploaded Into Epic Yes  Wound Consult Placed N/A  RN Wound Prevention Protocol Ordered No

## 2022-01-21 NOTE — THERAPY
01/21/22 0754   Interdisciplinary Plan of Care Collaboration   Collaboration Comments OT referral received. Pt awaiting surgical intervention. Will complete OT post-op as indicated.

## 2022-01-21 NOTE — THERAPY
Physical Therapy Contact Note    Patient Name: Navin Suazo  Age:  89 y.o., Sex:  male  Medical Record #: [id number]  Today's Date: 1/21/2022 01/21/22 0748   Interdisciplinary Plan of Care Collaboration   Collaboration Comments PT consult received.  Per chart review pt scheduled for R hip hemiarthroplasty today.  Will HOLD and initiate PT eval post op as able/appropriate.     Pennie Perez, PT, DPT

## 2022-01-21 NOTE — ANESTHESIA PREPROCEDURE EVALUATION
Case: [id number] Date/Time: 01/21/22 1715    Procedure: HEMIARTHROPLASTY, HIP (Right )    Location: [place] [address] / SURGERY [facility]    Surgeons: Lloyd Roman M.D.          Relevant Problems   ANESTHESIA (within normal limits)      PULMONARY (within normal limits)      NEURO (within normal limits)      CARDIAC   (positive) CAD (coronary artery disease), native coronary artery      GI (within normal limits)         (positive) Stage 3a chronic kidney disease (HCC)      ENDO (within normal limits)      Other   (positive) Closed right hip fracture, initial encounter ([facility])   (positive) Insomnia   (positive) Mixed hyperlipidemia       Physical Exam    Airway   Mallampati: II  TM distance: >3 FB  Neck ROM: full       Cardiovascular - normal exam  Rhythm: regular  Rate: normal  (-) murmur     Dental - normal exam           Pulmonary - normal exam  Breath sounds clear to auscultation     Abdominal    Neurological - normal exam                 Anesthesia Plan    ASA 3- EMERGENT   ASA physical status 3 criteria: CAD/stents (> 3 months)ASA physical status emergent criteria: compromised vital organ, limb or tissue    Plan - general       Airway plan will be LMA          Induction: intravenous    Postoperative Plan: Postoperative administration of opioids is intended.    Pertinent diagnostic labs and testing reviewed    Informed Consent:    Anesthetic plan and risks discussed with patient.    Use of blood products discussed with: patient whom consented to blood products.

## 2022-01-21 NOTE — CARE PLAN
The patient is Stable - Low risk of patient condition declining or worsening    Shift Goals  Clinical Goals: safety; comfort  Patient Goals: rest; pain control    Progress made toward(s) clinical / shift goals:  Pt pain controlled with Prn and scheduled medication.  Pt instructed on use of call light.  Pt calls appropriately.  Pt is able to turn self to the right side.  Pt tolerates positioning well.  Pt resting in bed.  Bed locked and in lowest position.  Call light within reach.     Problem: Pain - Standard  Goal: Alleviation of pain or a reduction in pain to the patient’s comfort goal  Outcome: Progressing     Problem: Knowledge Deficit - Standard  Goal: Patient and family/care givers will demonstrate understanding of plan of care, disease process/condition, diagnostic tests and medications  Outcome: Progressing     Problem: Skin Integrity  Goal: Skin integrity is maintained or improved  Outcome: Progressing     Problem: Fall Risk  Goal: Patient will remain free from falls  Outcome: Progressing       Patient is not progressing towards the following goals:

## 2022-01-21 NOTE — CARE PLAN
Problem: Pain - Standard  Goal: Alleviation of pain or a reduction in pain to the patient’s comfort goal  Outcome: Progressing  Note:  Patient educated on 0-10 pain scale, and non-pharmacological pain control. Encouraged to verbalize and contact staff when in pain.  Administered PRN medication as needed.       Problem: Fall Risk  Goal: Patient will remain free from falls  Outcome: Progressing  Note:  Verified bed is locked and in lowest position, hourly rounding in place, bed alarm on, call light with in reach, slip socks on, educated patient on use of call light for assistance.     The patient is Stable - Low risk of patient condition declining or worsening    Shift Goals  Clinical Goals: pain control, safety  Patient Goals: pain control, rest     Progress made toward(s) clinical / shift goals:

## 2022-01-22 LAB
ANION GAP SERPL CALC-SCNC: 12 MMOL/L (ref 7–16)
BASOPHILS # BLD AUTO: 0.1 % (ref 0–1.8)
BASOPHILS # BLD: 0.01 K/UL (ref 0–0.12)
BUN SERPL-MCNC: 40 MG/DL (ref 8–22)
CALCIUM SERPL-MCNC: 7.9 MG/DL (ref 8.5–10.5)
CHLORIDE SERPL-SCNC: 103 MMOL/L (ref 96–112)
CO2 SERPL-SCNC: 22 MMOL/L (ref 20–33)
CREAT SERPL-MCNC: 1.46 MG/DL (ref 0.5–1.4)
EKG IMPRESSION: NORMAL
EOSINOPHIL # BLD AUTO: 0.12 K/UL (ref 0–0.51)
EOSINOPHIL NFR BLD: 1.1 % (ref 0–6.9)
ERYTHROCYTE [DISTWIDTH] IN BLOOD BY AUTOMATED COUNT: 44.3 FL (ref 35.9–50)
GLUCOSE SERPL-MCNC: 132 MG/DL (ref 65–99)
HCT VFR BLD AUTO: 36.8 % (ref 42–52)
HGB BLD-MCNC: 12.7 G/DL (ref 14–18)
IMM GRANULOCYTES # BLD AUTO: 0.07 K/UL (ref 0–0.11)
IMM GRANULOCYTES NFR BLD AUTO: 0.6 % (ref 0–0.9)
LYMPHOCYTES # BLD AUTO: 0.88 K/UL (ref 1–4.8)
LYMPHOCYTES NFR BLD: 7.7 % (ref 22–41)
MAGNESIUM SERPL-MCNC: 2 MG/DL (ref 1.5–2.5)
MCH RBC QN AUTO: 34.9 PG (ref 27–33)
MCHC RBC AUTO-ENTMCNC: 34.5 G/DL (ref 33.7–35.3)
MCV RBC AUTO: 101.1 FL (ref 81.4–97.8)
MONOCYTES # BLD AUTO: 1.54 K/UL (ref 0–0.85)
MONOCYTES NFR BLD AUTO: 13.5 % (ref 0–13.4)
NEUTROPHILS # BLD AUTO: 8.75 K/UL (ref 1.82–7.42)
NEUTROPHILS NFR BLD: 77 % (ref 44–72)
NRBC # BLD AUTO: 0 K/UL
NRBC BLD-RTO: 0 /100 WBC
PHOSPHATE SERPL-MCNC: 2.4 MG/DL (ref 2.5–4.5)
PLATELET # BLD AUTO: 148 K/UL (ref 164–446)
PMV BLD AUTO: 9.9 FL (ref 9–12.9)
POTASSIUM SERPL-SCNC: 4.1 MMOL/L (ref 3.6–5.5)
RBC # BLD AUTO: 3.64 M/UL (ref 4.7–6.1)
SODIUM SERPL-SCNC: 137 MMOL/L (ref 135–145)
WBC # BLD AUTO: 11.4 K/UL (ref 4.8–10.8)

## 2022-01-22 PROCEDURE — 770001 HCHG ROOM/CARE - MED/SURG/GYN PRIV*

## 2022-01-22 PROCEDURE — 80048 BASIC METABOLIC PNL TOTAL CA: CPT

## 2022-01-22 PROCEDURE — 99024 POSTOP FOLLOW-UP VISIT: CPT | Performed by: ORTHOPAEDIC SURGERY

## 2022-01-22 PROCEDURE — 93005 ELECTROCARDIOGRAM TRACING: CPT | Performed by: INTERNAL MEDICINE

## 2022-01-22 PROCEDURE — 97162 PT EVAL MOD COMPLEX 30 MIN: CPT

## 2022-01-22 PROCEDURE — 97166 OT EVAL MOD COMPLEX 45 MIN: CPT

## 2022-01-22 PROCEDURE — 700102 HCHG RX REV CODE 250 W/ 637 OVERRIDE(OP): Performed by: ORTHOPAEDIC SURGERY

## 2022-01-22 PROCEDURE — 85025 COMPLETE CBC W/AUTO DIFF WBC: CPT

## 2022-01-22 PROCEDURE — 97535 SELF CARE MNGMENT TRAINING: CPT

## 2022-01-22 PROCEDURE — 99232 SBSQ HOSP IP/OBS MODERATE 35: CPT | Performed by: INTERNAL MEDICINE

## 2022-01-22 PROCEDURE — 700102 HCHG RX REV CODE 250 W/ 637 OVERRIDE(OP): Performed by: STUDENT IN AN ORGANIZED HEALTH CARE EDUCATION/TRAINING PROGRAM

## 2022-01-22 PROCEDURE — 83735 ASSAY OF MAGNESIUM: CPT

## 2022-01-22 PROCEDURE — A9270 NON-COVERED ITEM OR SERVICE: HCPCS | Performed by: ORTHOPAEDIC SURGERY

## 2022-01-22 PROCEDURE — 36415 COLL VENOUS BLD VENIPUNCTURE: CPT

## 2022-01-22 PROCEDURE — A9270 NON-COVERED ITEM OR SERVICE: HCPCS | Performed by: STUDENT IN AN ORGANIZED HEALTH CARE EDUCATION/TRAINING PROGRAM

## 2022-01-22 PROCEDURE — 700111 HCHG RX REV CODE 636 W/ 250 OVERRIDE (IP): Performed by: ORTHOPAEDIC SURGERY

## 2022-01-22 PROCEDURE — 84100 ASSAY OF PHOSPHORUS: CPT

## 2022-01-22 PROCEDURE — 93010 ELECTROCARDIOGRAM REPORT: CPT | Performed by: INTERNAL MEDICINE

## 2022-01-22 RX ADMIN — CYANOCOBALAMIN TAB 500 MCG 1000 MCG: 500 TAB at 05:16

## 2022-01-22 RX ADMIN — KETOROLAC TROMETHAMINE 15 MG: 30 INJECTION, SOLUTION INTRAMUSCULAR at 05:15

## 2022-01-22 RX ADMIN — ATORVASTATIN CALCIUM 40 MG: 40 TABLET, FILM COATED ORAL at 05:16

## 2022-01-22 RX ADMIN — KETOROLAC TROMETHAMINE 15 MG: 30 INJECTION, SOLUTION INTRAMUSCULAR at 11:39

## 2022-01-22 RX ADMIN — FOLIC ACID 1 MG: 1 TABLET ORAL at 05:16

## 2022-01-22 RX ADMIN — ACETAMINOPHEN 650 MG: 325 TABLET, FILM COATED ORAL at 05:16

## 2022-01-22 RX ADMIN — KETOROLAC TROMETHAMINE 15 MG: 30 INJECTION, SOLUTION INTRAMUSCULAR at 18:20

## 2022-01-22 RX ADMIN — POLYETHYLENE GLYCOL 3350 1 PACKET: 17 POWDER, FOR SOLUTION ORAL at 18:20

## 2022-01-22 RX ADMIN — ACETAMINOPHEN 650 MG: 325 TABLET, FILM COATED ORAL at 11:38

## 2022-01-22 RX ADMIN — Medication 5 MG: at 21:22

## 2022-01-22 RX ADMIN — ACETAMINOPHEN 650 MG: 325 TABLET, FILM COATED ORAL at 18:20

## 2022-01-22 RX ADMIN — ENOXAPARIN SODIUM 40 MG: 40 INJECTION SUBCUTANEOUS at 05:16

## 2022-01-22 RX ADMIN — DOCUSATE SODIUM 100 MG: 100 CAPSULE, LIQUID FILLED ORAL at 05:16

## 2022-01-22 RX ADMIN — DOCUSATE SODIUM 100 MG: 100 CAPSULE, LIQUID FILLED ORAL at 18:20

## 2022-01-22 RX ADMIN — DOCUSATE SODIUM 50 MG AND SENNOSIDES 8.6 MG 1 TABLET: 8.6; 5 TABLET, FILM COATED ORAL at 21:22

## 2022-01-22 ASSESSMENT — PAIN DESCRIPTION - PAIN TYPE
TYPE: SURGICAL PAIN

## 2022-01-22 ASSESSMENT — COGNITIVE AND FUNCTIONAL STATUS - GENERAL
SUGGESTED CMS G CODE MODIFIER MOBILITY: CL
MOVING TO AND FROM BED TO CHAIR: UNABLE
SUGGESTED CMS G CODE MODIFIER DAILY ACTIVITY: CK
WALKING IN HOSPITAL ROOM: A LOT
CLIMB 3 TO 5 STEPS WITH RAILING: TOTAL
STANDING UP FROM CHAIR USING ARMS: A LITTLE
PERSONAL GROOMING: A LITTLE
DAILY ACTIVITIY SCORE: 17
DRESSING REGULAR LOWER BODY CLOTHING: A LOT
TOILETING: A LITTLE
MOBILITY SCORE: 11
MOVING FROM LYING ON BACK TO SITTING ON SIDE OF FLAT BED: UNABLE
HELP NEEDED FOR BATHING: A LOT
DRESSING REGULAR UPPER BODY CLOTHING: A LITTLE
TURNING FROM BACK TO SIDE WHILE IN FLAT BAD: A LITTLE

## 2022-01-22 ASSESSMENT — PATIENT HEALTH QUESTIONNAIRE - PHQ9
1. LITTLE INTEREST OR PLEASURE IN DOING THINGS: NOT AT ALL
2. FEELING DOWN, DEPRESSED, IRRITABLE, OR HOPELESS: NOT AT ALL
SUM OF ALL RESPONSES TO PHQ9 QUESTIONS 1 AND 2: 0

## 2022-01-22 ASSESSMENT — ENCOUNTER SYMPTOMS
SHORTNESS OF BREATH: 0
NAUSEA: 0
CHILLS: 0
ABDOMINAL PAIN: 0
FEVER: 0
VOMITING: 0

## 2022-01-22 ASSESSMENT — ACTIVITIES OF DAILY LIVING (ADL): TOILETING: INDEPENDENT

## 2022-01-22 NOTE — CARE PLAN
The patient is Watcher - Medium risk of patient condition declining or worsening    Shift Goals  Clinical Goals: monitor vital signs   Patient Goals: rest   Family Goals: n/a     Progress made toward(s) clinical / shift goals:    Problem: Pain - Standard  Goal: Alleviation of pain or a reduction in pain to the patient’s comfort goal  Outcome: Progressing  Note: Pt pain in control this am. Pt rates pain 2/10     Problem: Fall Risk  Goal: Patient will remain free from falls  Outcome: Progressing  Note: Bed locked and lowest position, call light within reach. Pt was reminded to call before getting out of bed.      Problem: Knowledge Deficit - Standard  Goal: Patient and family/care givers will demonstrate understanding of plan of care, disease process/condition, diagnostic tests and medications  Note: Educated patient on POC, monitor vital signs, safety, and surgery scheduled for today. All questions answered        Patient is not progressing towards the following goals:n/a

## 2022-01-22 NOTE — CARE PLAN
The patient is Stable - Low risk of patient condition declining or worsening    Shift Goals  Clinical Goals: safety; posterior hip precautions  Patient Goals: rest  Family Goals: n/a     Progress made toward(s) clinical / shift goals:  Pt tolerating q 2 turns.  Ice applied to surgical site.  Pt pain well controlled with scheduled and PRN medications.. Pt family at bedside.  Pt calls appropriately.   Problem: Pain - Standard  Goal: Alleviation of pain or a reduction in pain to the patient’s comfort goal  Outcome: Progressing     Problem: Knowledge Deficit - Standard  Goal: Patient and family/care givers will demonstrate understanding of plan of care, disease process/condition, diagnostic tests and medications  Outcome: Progressing     Problem: Skin Integrity  Goal: Skin integrity is maintained or improved  Outcome: Progressing     Problem: Fall Risk  Goal: Patient will remain free from falls  Outcome: Progressing       Patient is not progressing towards the following goals:

## 2022-01-22 NOTE — THERAPY
"Occupational Therapy   Initial Evaluation     Patient Name: Navin Suazo  Age:  89 y.o., Sex:  male  Medical Record #: [id number]  Today's Date: 1/22/2022     Precautions: Fall Risk,Posterior Hip Precautions,Weight Bearing As Tolerated Right Lower Extremity  Comments: [place]; hypotensive labile HR     Assessment  Patient is 89 y.o. male admitted after GLF down stairs. PMHx: dyslipidemia, CKD 3, CAD, erectile dysfunction and insomnia. This admission pt is dx w/right displaced femoral neck fx pt is WBAT w/posterior hip precautions, pt has post op pain and decreased understanding of posterior hip prec related to impaired new learning d/t [place]. Pt was also limited by c/o dizziness found to have low BP, fluctuating O2 down and c/o dizziness. RN notified. SHANEL post session. Pt is highly motivated and may have increased assist from Daughter up on dc, pt would be able to tolerate daily intensive therapy. Recommend physiatry consult.     Plan  Recommend Occupational Therapy 5 times per week until therapy goals are met for the following treatments:  Adaptive Equipment, Self Care/Activities of Daily Living, Therapeutic Activities and Therapeutic Exercises.    DC Equipment Recommendations: Unable to determine at this time  Discharge Recommendations: Recommend post-acute placement for additional occupational therapy services prior to discharge home     Subjective    \"You really think I will get up and use that thing\" referring to [facility]     Objective     01/22/22 1527   Prior Living Situation   Prior Services None   Housing / Facility 2 Story House   Steps Into Home 9   Steps In Home 0   Bathroom Set up Walk In Shower;Grab Bars   Equipment Owned Front-Wheel Walker;4-Wheel Walker   Lives with - Patient's Self Care Capacity Spouse   Comments Pt reports SO has parkinson's and is not able to assist, however Dtr may be coming to stay with patient as needed    Prior Level of ADL Function   Self Feeding Independent   Grooming / Hygiene " Independent   Bathing Independent   Dressing Independent   Toileting Independent   Prior Level of IADL Function   Medication Management Independent   Laundry Independent   Kitchen Mobility Independent   Finances Independent   Home Management Independent   Shopping Independent   Prior Level Of Mobility Independent Without Device in Community   Driving / Transportation Driving Independent   Precautions   Precautions Fall Risk;Posterior Hip Precautions;Weight Bearing As Tolerated Right Lower Extremity   Comments [place]; hypotensive labile HR    Pain 0 - 10 Group   Location Hip   Location Orientation Right   Therapist Pain Assessment During Activity;Nurse Notified;4   Cognition    Cognition / Consciousness WDL   Level of Consciousness Alert   Comments pleasant and coopeartive but very [place]    Passive ROM Upper Body   Passive ROM Upper Body WDL   Active ROM Upper Body   Active ROM Upper Body  WDL   Strength Upper Body   Upper Body Strength  WDL   Sensation Upper Body   Upper Extremity Sensation  Not Tested   Upper Body Muscle Tone   Upper Body Muscle Tone  WDL   Neurological Concerns   Neurological Concerns No   Coordination Upper Body   Coordination WDL   Balance Assessment   Sitting Balance (Static) Fair +   Sitting Balance (Dynamic) Fair   Standing Balance (Static) Fair   Standing Balance (Dynamic) Fair -   Weight Shift Sitting Fair   Weight Shift Standing Poor   Comments w/fww    Bed Mobility    Supine to Sit Moderate Assist   Sit to Supine Moderate Assist   Scooting Moderate Assist   Rolling Minimal Assist to Rt.;Minimum Assist to Lt.   ADL Assessment   Grooming Contact Guard Assist;Seated   Upper Body Dressing Minimal Assist   Lower Body Dressing Maximal Assist   Toileting   (NT)   How much help from another person does the patient currently need...   6 Clicks Daily Activity Score 17   Functional Mobility   Sit to Stand Minimal Assist   Bed, Chair, Wheelchair Transfer Minimal Assist   Mobility EOB sit>stand x2 c/o  dizziness low BP BTB    Visual Perception   Visual Perception  Not Tested   Edema / Skin Assessment   Edema / Skin  Not Assessed   Activity Tolerance   Comments c/o pain and dizziness    Patient / Family Goals   Patient / Family Goal #1 to go home    Short Term Goals   Short Term Goal # 1 pt will complete grooming standing at sink w/spv    Short Term Goal # 2 pt will complete toilet txf w/spv    Short Term Goal # 3 pt will complete LB dressing w/use of AE and spv    Education Group   Education Provided Hip Precautions   Role of Occupational Therapist Patient Response Patient;Acceptance;Explanation   Hip Precautions Patient Response Patient;Acceptance;Explanation   Problem List   Problem List Decreased Active Daily Living Skills;Decreased Functional Mobility;Decreased Activity Tolerance;Impaired Postural Control / Balance;Limited Knowledge of Post Op Precautions   Anticipated Discharge Equipment and Recommendations   DC Equipment Recommendations Unable to determine at this time   Discharge Recommendations Recommend post-acute placement for additional occupational therapy services prior to discharge home   Interdisciplinary Plan of Care Collaboration   IDT Collaboration with  Nursing;Physical Therapist   Patient Position at End of Therapy In Bed;Call Light within Reach;Tray Table within Reach;Phone within Reach;Bed Alarm On   Collaboration Comments RN aware of OT eval and pts efforts    Session Information   Date / Session Number  1/22 #1 (1/5, 1/28)   Priority 4

## 2022-01-22 NOTE — PROGRESS NOTES
S:  s/p right hip hemiarthroplasty  Pain controlled  No N/V  No Chest Pain/SOB  Afebrile  Exam:    NAD A& O x3    RLE: +DF/PF/EHL SILT L4/L5/S1  LLE:  +DF/PF/EHL SILT L4/L5/S1    Plan:  Continue standard plan of care  Weight bearing: as tolerated with walker/cane  DVT prophylaxis: SCD/Teds + lovenox, transition to ASA when appropriate  Dispo: discharge planning

## 2022-01-22 NOTE — PROGRESS NOTES
Pt back on unit.  Pt family at the bedside.  Educated family on standard trajectory of care with hemiarthroplasty.  Pt daughter Jessika is primary contact regarding care.  Jessika and her brothers are starting to plan next phase of care for both of their parents.  Mother has parkinson's.  Pt children feel parents should not be living independently and are beginning to plan senior care/assisted living transition.  Discussed with Jessika and Lenny getting POA and gathering documentation to present to CM for further decision/planning.

## 2022-01-22 NOTE — PROGRESS NOTES
[facility] Medicine Daily Progress Note    Date of Service  1/22/2022    Chief Complaint  Navin Suazo is a 89 y.o. male admitted 1/19/2022 with right hip pain    Hospital Course  Navin Suazo is a 89 y.o. male who presented 1/19/2022 with fall.  PMH of dyslipidemia, CKD 3.  Patient tripped down some stairs, fell down on the last step and hit the concrete on the right side earlier this evening.  Did not has hit his head, denies LOC, most of the pain is on the right way he also has some left hip pain as well.  CT head, C-spine with no acute abnormality.  CT pelvis shows angulated and displaced transcervical right femoral neck fracture.  EDP spoke with Ortho who will evaluate the patient for surgery tomorrow.    Interval Problem Update  POD 1 s/p right hip fx repair. Pain well controlled on scheduled acetaminophen, IV toradol. Tolerating diet.   Looking forward to mobilizing with PT. Motivated, may be appropriate candidate for inpatient rehab. Will follow PT/OT evaluations.    I have personally seen and examined the patient at bedside. I discussed the plan of care with patient, bedside RN and orthopedics.    Consultants/Specialty  orthopedics    Code Status  Full Code    Disposition  Patient is not medically cleared for discharge.   Anticipate discharge to to skilled nursing facility vs inpatient rehab.  I have placed the appropriate orders for post-discharge needs.    Review of Systems  Review of Systems   Constitutional: Negative for chills and fever.   HENT: Positive for hearing loss.    Respiratory: Negative for shortness of breath.    Cardiovascular: Negative for chest pain and leg swelling.   Gastrointestinal: Negative for abdominal pain, nausea and vomiting.   Genitourinary: Negative for dysuria.   Musculoskeletal:        Right hip pain improved   All other systems reviewed and are negative.       Physical Exam  Temp:  [35.9 °C (96.7 °F)-36.9 °C (98.4 °F)] 36.3 °C (97.4 °F)  Pulse:  [68-92] 74  Resp:   [12-19] 18  BP: ()/(55-88) 99/55  SpO2:  [90 %-98 %] 92 %    Physical Exam  Vitals and nursing note reviewed.   Constitutional:       General: He is not in acute distress.     Appearance: Normal appearance.   HENT:      Head: Normocephalic and atraumatic.      Right Ear: External ear normal.      Left Ear: External ear normal.      Nose: Nose normal. No congestion.      Mouth/Throat:      Mouth: Mucous membranes are moist.      Pharynx: Oropharynx is clear.   Eyes:      General: No scleral icterus.     Conjunctiva/sclera: Conjunctivae normal.   Cardiovascular:      Rate and Rhythm: Normal rate and regular rhythm.      Heart sounds: Normal heart sounds.   Pulmonary:      Effort: No respiratory distress.      Breath sounds: Normal breath sounds. No wheezing or rales.   Abdominal:      General: Abdomen is flat. Bowel sounds are normal. There is no distension.      Tenderness: There is no abdominal tenderness.   Musculoskeletal:      Cervical back: Neck supple. No rigidity.      Right lower leg: No edema.      Left lower leg: No edema.      Comments: Right hip surgical dressing in place, c/d/i without strikethrough. Surrounding skin nonerythematous, nonedematous, without ecchymosis.   Skin:     General: Skin is warm and dry.   Neurological:      Mental Status: He is alert and oriented to person, place, and time.      Cranial Nerves: No cranial nerve deficit.   Psychiatric:         Mood and Affect: Mood normal.         Thought Content: Thought content normal.         Judgment: Judgment normal.         Fluids  No intake or output data in the 24 hours ending 01/22/22 1356    Laboratory  Recent Labs     01/19/22 2329 01/21/22  0625   WBC 9.0 11.0*   RBC 4.31* 4.48*   HEMOGLOBIN 14.6 15.7   HEMATOCRIT 43.5 45.7   .9* 102.0*   MCH 33.9* 35.0*   MCHC 33.6* 34.4   RDW 46.4 45.9   PLATELETCT 151* 151*   MPV 9.7 9.7     Recent Labs     01/19/22 2329 01/21/22  0625   SODIUM 139 139   POTASSIUM 4.4 3.9   CHLORIDE 105  105   CO2 20 20   GLUCOSE 108* 114*   BUN 26* 19   CREATININE 1.21 1.00   CALCIUM 9.2 8.4*                   Imaging  DX-CHEST-LIMITED (1 VIEW)   Final Result      1.  Cardiomegaly.   2.  Slight interstitial prominence. No consolidation or pleural effusion.      DX-FEMUR-2+ RIGHT   Final Result      Mildly angulated and displaced transcervical right femoral neck fracture.      DX-PELVIS-1 OR 2 VIEWS   Final Result      Mildly displaced transcervical right femoral neck fracture.      CT-PELVIS W/O PLUS RECONS   Final Result      Mildly comminuted, angulated and displaced transcervical right femoral neck fracture.      CT-HEAD W/O   Final Result      1.  Chronic ischemic changes.   2.  No acute intracranial abnormality.         CT-CSPINE WITHOUT PLUS RECONS   Final Result      1.  No acute fracture or dislocation of the cervical spine.   2.  Mild degeneration.   3.  Severe carotid calcified plaque.           Assessment/Plan  * Closed right hip fracture, initial encounter (HCC)- (present on admission)  Assessment & Plan  Mechanical fall down some stairs prior to admission.  CT pelvis shows right femoral neck fracture  S/p right hip fx repair on 1/21. Appreciate ortho recommendations.  Pain well controlled, continue current regimen. Wean IV medications as tolerated.  Motivated, may be appropriate candidate for inpatient rehab. Will follow PT/OT evaluations.    CAD (coronary artery disease), native coronary artery  Assessment & Plan  From chart review patient appears of had CAD with a stent placed in the past.  Unclear when this was placed however greater than 1 year ago.  Continue home atorvastatin.  Continue home ASA on discharge.    Elevated MCV  Assessment & Plan  .9.  B12 and folate ordered  TSH wnl.    Stage 3a chronic kidney disease (HCC)- (present on admission)  Assessment & Plan  History of CKD, eGFR ~60  Avoid nephrotoxic agents    Mixed hyperlipidemia- (present on admission)  Assessment & Plan  Continue  statin    Insomnia- (present on admission)  Assessment & Plan  Continue melatonin       VTE prophylaxis: pharmacologic prophylaxis contraindicated due to Pending surgery    I have performed a physical exam and reviewed and updated ROS and Plan today (1/22/2022). In review of yesterday's note (1/21/2022), there are no changes except as documented above.

## 2022-01-22 NOTE — OP REPORT
DATE OF OPERATION: 1/21/2022     PREOPERATIVE DIAGNOSIS: Right displaced femoral neck fracture    POSTOPERATIVE DIAGNOSIS: Same    PROCEDURE PERFORMED: Open treatment of right femoral fracture, proximal end, neck with prosthetic replacement    SURGEON: Lloyd Roman M.D.     ASSISTANT: Dany Buckner PA-C    ANESTHESIOLOGIST: Hernesto Colin MD.    ANESTHESIA: General    ESTIMATED BLOOD LOSS: 50 mL    INDICATIONS: The patient is a 89 y.o. male with a right femoral neck fracture resulting from a ground level fall.  The patient denies antecedent pain, and was found to have a normal neurovascular exam and skin envelope.  Radiographs reviewed by myself demonstrated a displaced femoral neck fracture.  Given these findings, the patient is a candidate for surgical treatment of the femoral neck fracture with hemiarthroplasty.  I discussed the risks and benefits of the procedure, including the risks of infection, wound healing complication, neurovascular injury, instability, limb length discrepancy, need for revision surgery, and the medical risks of anesthesia including DVT, PE, MI, stroke, and death.  Benefits include early mobilization and reduction in the medical risks of hip fractures.  Alternatives to surgery were also discussed, including non-operative management, percutaneous screw fixation and total hip arthroplasty.  The patient was in agreement with the plan to proceed, the informed consent was signed and documented and the operative extremity was marked.      PROCEDURE:  The patient was sedated with general anesthesia, and positioned in the lateral decubitus position on a beanbag with all bony prominences well padded and an axillary roll placed.  Perioperative antibiotics were administered. Sequential compression devices were employed.  The correct operative site was prepped and draped into a sterile field.  A procedural pause was conducted to verify correct patient, correct extremity, and presence of the  surgeons initials on the operative extremity.    A posterior approach to the hip was performed with care taken to avoid all neurovascular structures.  The fascia was split in line with the incision to the tip of the greater troch, then curved posteriorly to parallel the gluteal fibers.  The short external rotators and capsule were taken down en bloc and tagged with No 5 Ticron suture for future repair. The labrum was preserved and the sciatic nerve was protected at all times.    A femoral neck cut was made one finger breadth above the lesser trochanter and the femoral head was removed without difficulty.  The acetabulum was inspected and cleared of foreign material.  A femoral neck retractor was placed, and the canal was sequentially reamed and broached to a size C5.  After preparation of the canal, a cement restrictor was placed. A [employer] Ion size C5 stem was cemented in the appropriate version using third generation cement techniques.    Once cement had dried completely a 28+0mm head and 51mm shell were impacted. The hip was then reduced and stability was tested. Hip was stable in full extension and external rotation and stable to 90 degrees internal rotation at 90 degrees forward flexion. Wounds were irrigated and capsule was closed to greater trochanter with #5 Ticron sutures.  The wound was then closed in layers, with #1 Vicryl in the fascia, 2-0 vicryl in the subcutaneous tissue, and staples in the skin.  Sterile dressings were applied, and the patient was transferred to PACU in stable condition.    The patient tolerated the procedure well. There were no apparent complications. All sponge, needle, and instrument counts were correct on two separate occasions. He was awakened, extubated, and transferred to the recovery room in satisfactory condition.     The use of Dany Buckner as a surgical assistant was necessary for assistance with exposure, retraction, fracture reduction, instrumentation, and  closure.      Post-Operative Plan:    1.  The patient should bear weight as tolerated on their operative extremity with posterior hip precautions.  Gait aids (crutch or crutches, cane, walker) may be used as needed, and may be discontinued when no longer required.  2.  IV antibiotics - may be continued for 24 hours  3.  DVT prophylaxis - SCD's and Lovenox 40 mg SQ daily while inpatient.  The patient may transition to Aspirin 325 mg PO BID as an outpatient  4.  Discharge planning  ____________________________________   Lloyd Roman M.D.   DD: 1/21/2022  5:28 PM

## 2022-01-23 PROBLEM — D62 ACUTE BLOOD LOSS ANEMIA: Status: ACTIVE | Noted: 2022-01-23

## 2022-01-23 PROBLEM — N17.9 AKI (ACUTE KIDNEY INJURY) (HCC): Status: ACTIVE | Noted: 2022-01-23

## 2022-01-23 PROCEDURE — 770001 HCHG ROOM/CARE - MED/SURG/GYN PRIV*

## 2022-01-23 PROCEDURE — 700102 HCHG RX REV CODE 250 W/ 637 OVERRIDE(OP): Performed by: INTERNAL MEDICINE

## 2022-01-23 PROCEDURE — A9270 NON-COVERED ITEM OR SERVICE: HCPCS | Performed by: ORTHOPAEDIC SURGERY

## 2022-01-23 PROCEDURE — A9270 NON-COVERED ITEM OR SERVICE: HCPCS | Performed by: STUDENT IN AN ORGANIZED HEALTH CARE EDUCATION/TRAINING PROGRAM

## 2022-01-23 PROCEDURE — 99024 POSTOP FOLLOW-UP VISIT: CPT | Performed by: ORTHOPAEDIC SURGERY

## 2022-01-23 PROCEDURE — A9270 NON-COVERED ITEM OR SERVICE: HCPCS | Performed by: INTERNAL MEDICINE

## 2022-01-23 PROCEDURE — 700102 HCHG RX REV CODE 250 W/ 637 OVERRIDE(OP): Performed by: STUDENT IN AN ORGANIZED HEALTH CARE EDUCATION/TRAINING PROGRAM

## 2022-01-23 PROCEDURE — 700105 HCHG RX REV CODE 258: Performed by: INTERNAL MEDICINE

## 2022-01-23 PROCEDURE — 700111 HCHG RX REV CODE 636 W/ 250 OVERRIDE (IP): Performed by: ORTHOPAEDIC SURGERY

## 2022-01-23 PROCEDURE — 99233 SBSQ HOSP IP/OBS HIGH 50: CPT | Performed by: INTERNAL MEDICINE

## 2022-01-23 PROCEDURE — 700102 HCHG RX REV CODE 250 W/ 637 OVERRIDE(OP): Performed by: ORTHOPAEDIC SURGERY

## 2022-01-23 RX ORDER — SODIUM CHLORIDE, SODIUM LACTATE, POTASSIUM CHLORIDE, AND CALCIUM CHLORIDE .6; .31; .03; .02 G/100ML; G/100ML; G/100ML; G/100ML
500 INJECTION, SOLUTION INTRAVENOUS ONCE
Status: COMPLETED | OUTPATIENT
Start: 2022-01-23 | End: 2022-01-23

## 2022-01-23 RX ADMIN — KETOROLAC TROMETHAMINE 15 MG: 30 INJECTION, SOLUTION INTRAMUSCULAR at 23:53

## 2022-01-23 RX ADMIN — Medication 5 MG: at 22:04

## 2022-01-23 RX ADMIN — ACETAMINOPHEN 650 MG: 325 TABLET, FILM COATED ORAL at 06:17

## 2022-01-23 RX ADMIN — DIBASIC SODIUM PHOSPHATE, MONOBASIC POTASSIUM PHOSPHATE AND MONOBASIC SODIUM PHOSPHATE 500 MG: 852; 155; 130 TABLET ORAL at 14:51

## 2022-01-23 RX ADMIN — KETOROLAC TROMETHAMINE 15 MG: 30 INJECTION, SOLUTION INTRAMUSCULAR at 00:06

## 2022-01-23 RX ADMIN — ACETAMINOPHEN 650 MG: 325 TABLET, FILM COATED ORAL at 17:32

## 2022-01-23 RX ADMIN — FOLIC ACID 1 MG: 1 TABLET ORAL at 06:18

## 2022-01-23 RX ADMIN — CYANOCOBALAMIN TAB 500 MCG 1000 MCG: 500 TAB at 06:17

## 2022-01-23 RX ADMIN — DOCUSATE SODIUM 50 MG AND SENNOSIDES 8.6 MG 1 TABLET: 8.6; 5 TABLET, FILM COATED ORAL at 22:04

## 2022-01-23 RX ADMIN — DOCUSATE SODIUM 100 MG: 100 CAPSULE, LIQUID FILLED ORAL at 06:18

## 2022-01-23 RX ADMIN — POLYETHYLENE GLYCOL 3350 1 PACKET: 17 POWDER, FOR SOLUTION ORAL at 06:17

## 2022-01-23 RX ADMIN — ACETAMINOPHEN 650 MG: 325 TABLET, FILM COATED ORAL at 00:05

## 2022-01-23 RX ADMIN — ACETAMINOPHEN 650 MG: 325 TABLET, FILM COATED ORAL at 23:52

## 2022-01-23 RX ADMIN — KETOROLAC TROMETHAMINE 15 MG: 30 INJECTION, SOLUTION INTRAMUSCULAR at 06:18

## 2022-01-23 RX ADMIN — KETOROLAC TROMETHAMINE 15 MG: 30 INJECTION, SOLUTION INTRAMUSCULAR at 17:33

## 2022-01-23 RX ADMIN — ACETAMINOPHEN 650 MG: 325 TABLET, FILM COATED ORAL at 12:52

## 2022-01-23 RX ADMIN — ATORVASTATIN CALCIUM 40 MG: 40 TABLET, FILM COATED ORAL at 06:17

## 2022-01-23 RX ADMIN — ENOXAPARIN SODIUM 40 MG: 40 INJECTION SUBCUTANEOUS at 06:17

## 2022-01-23 RX ADMIN — SODIUM CHLORIDE, POTASSIUM CHLORIDE, SODIUM LACTATE AND CALCIUM CHLORIDE 500 ML: 600; 310; 30; 20 INJECTION, SOLUTION INTRAVENOUS at 14:55

## 2022-01-23 ASSESSMENT — ENCOUNTER SYMPTOMS
VOMITING: 0
ABDOMINAL PAIN: 0
CHILLS: 0
SHORTNESS OF BREATH: 0
NAUSEA: 0
FEVER: 0

## 2022-01-23 ASSESSMENT — PAIN DESCRIPTION - PAIN TYPE
TYPE: SURGICAL PAIN

## 2022-01-23 NOTE — ANESTHESIA TIME REPORT
Anesthesia Start and Stop Event Times     Date Time Event    1/21/2022 1611 Ready for Procedure     1632 Anesthesia Start     1729 Anesthesia Stop        Responsible Staff  01/21/22    Name Role Begin End    Hernesto Colin M.D. Anesth 1632 1729        Preop Diagnosis (Free Text):  Pre-op Diagnosis     RIGHT HIP FRACTURE        Preop Diagnosis (Codes):    Premium Reason  A. 3PM - 7AM    Comments:

## 2022-01-23 NOTE — CARE PLAN
The patient is Stable - Low risk of patient condition declining or worsening    Shift Goals  Clinical Goals: Posterior Hip Preacuations, Safety  Patient Goals: rest  Family Goals: ENMA    Progress made toward(s) clinical / shift goals:      Problem: Pain - Standard  Goal: Alleviation of pain or a reduction in pain to the patient’s comfort goal  Outcome: Progressing  Note: Pt reports no pain, rests comfortably with pillows for BLE elevation and ice pack PRN.     Problem: Skin Integrity  Goal: Skin integrity is maintained or improved  Outcome: Progressing  Note: Pt able to reposition self in bed, pillows to float heels, frequent moisture/incontinent checks.       Problem: Fall Risk  Goal: Patient will remain free from falls  Outcome: Progressing  Note: Bed is locked and in lowest position, treaded socks on, bed alarm on, DME out of sight, call light and belongings within reach, pt calls appropriately for assistance, hourly rounding in place.         Patient is not progressing towards the following goals:

## 2022-01-23 NOTE — PROGRESS NOTES
[facility] Medicine Daily Progress Note    Date of Service  1/23/2022    Chief Complaint  Navin Suazo is a 89 y.o. male admitted 1/19/2022 with right hip pain    Hospital Course  Navin Suazo is a 89 y.o. male who presented 1/19/2022 with fall.  PMH of dyslipidemia, CKD 3.  Patient tripped down some stairs, fell down on the last step and hit the concrete on the right side earlier this evening.  Did not has hit his head, denies LOC, most of the pain is on the right way he also has some left hip pain as well.  CT head, C-spine with no acute abnormality.  CT pelvis shows angulated and displaced transcervical right femoral neck fracture.  EDP spoke with Ortho who will evaluate the patient for surgery tomorrow.    Interval Problem Update  POD 2 s/p right hip fx repair.   Mildly hypotensive, with new CHAPITO and anemia. IVF bolus given. Will monitor.    Motivated, may be appropriate candidate for inpatient rehab. Referral placed.  His wife has parkinsons, is cared for by their daughter, who can not care for both.    I have personally seen and examined the patient at bedside. I discussed the plan of care with patient, bedside RN and orthopedics.    Consultants/Specialty  orthopedics    Code Status  Full Code    Disposition  Patient is not medically cleared for discharge.   Anticipate discharge to to skilled nursing facility vs inpatient rehab.  I have placed the appropriate orders for post-discharge needs.    Review of Systems  Review of Systems   Constitutional: Negative for chills and fever.   HENT: Positive for hearing loss.    Respiratory: Negative for shortness of breath.    Cardiovascular: Negative for chest pain and leg swelling.   Gastrointestinal: Negative for abdominal pain, nausea and vomiting.   Genitourinary: Negative for dysuria.   Musculoskeletal:        Right hip pain improved   All other systems reviewed and are negative.       Physical Exam  Temp:  [36.3 °C (97.3 °F)-36.8 °C (98.3 °F)] 36.4 °C  (97.6 °F)  Pulse:  [74-91] 91  Resp:  [15-19] 17  BP: ()/(62-79) 93/65  SpO2:  [90 %-97 %] 97 %    Physical Exam  Vitals and nursing note reviewed.   Constitutional:       General: He is not in acute distress.     Appearance: Normal appearance.   HENT:      Head: Normocephalic and atraumatic.      Right Ear: External ear normal.      Left Ear: External ear normal.      Nose: Nose normal. No congestion.      Mouth/Throat:      Mouth: Mucous membranes are moist.      Pharynx: Oropharynx is clear.   Eyes:      General: No scleral icterus.     Conjunctiva/sclera: Conjunctivae normal.   Cardiovascular:      Rate and Rhythm: Normal rate and regular rhythm.      Heart sounds: Normal heart sounds.   Pulmonary:      Effort: No respiratory distress.      Breath sounds: Normal breath sounds. No wheezing or rales.   Abdominal:      General: Abdomen is flat. Bowel sounds are normal. There is no distension.      Tenderness: There is no abdominal tenderness.   Musculoskeletal:      Cervical back: Neck supple. No rigidity.      Right lower leg: No edema.      Left lower leg: No edema.      Comments: Right hip surgical dressing in place, c/d/i without strikethrough. Surrounding skin nonerythematous, nonedematous, without ecchymosis.   Skin:     General: Skin is warm and dry.   Neurological:      Mental Status: He is alert and oriented to person, place, and time.      Cranial Nerves: No cranial nerve deficit.   Psychiatric:         Mood and Affect: Mood normal.         Thought Content: Thought content normal.         Judgment: Judgment normal.         Fluids    Intake/Output Summary (Last 24 hours) at 1/23/2022 1358  Last data filed at 1/23/2022 0900  Gross per 24 hour   Intake --   Output 650 ml   Net -650 ml       Laboratory  Recent Labs     01/21/22  0625 01/22/22 2016   WBC 11.0* 11.4*   RBC 4.48* 3.64*   HEMOGLOBIN 15.7 12.7*   HEMATOCRIT 45.7 36.8*   .0* 101.1*   MCH 35.0* 34.9*   MCHC 34.4 34.5   RDW 45.9 44.3    PLATELETCT 151* 148*   MPV 9.7 9.9     Recent Labs     01/21/22  0625 01/22/22 2016   SODIUM 139 137   POTASSIUM 3.9 4.1   CHLORIDE 105 103   CO2 20 22   GLUCOSE 114* 132*   BUN 19 40*   CREATININE 1.00 1.46*   CALCIUM 8.4* 7.9*                   Imaging  DX-CHEST-LIMITED (1 VIEW)   Final Result      1.  Cardiomegaly.   2.  Slight interstitial prominence. No consolidation or pleural effusion.      DX-FEMUR-2+ RIGHT   Final Result      Mildly angulated and displaced transcervical right femoral neck fracture.      DX-PELVIS-1 OR 2 VIEWS   Final Result      Mildly displaced transcervical right femoral neck fracture.      CT-PELVIS W/O PLUS RECONS   Final Result      Mildly comminuted, angulated and displaced transcervical right femoral neck fracture.      CT-HEAD W/O   Final Result      1.  Chronic ischemic changes.   2.  No acute intracranial abnormality.         CT-CSPINE WITHOUT PLUS RECONS   Final Result      1.  No acute fracture or dislocation of the cervical spine.   2.  Mild degeneration.   3.  Severe carotid calcified plaque.           Assessment/Plan  * Closed right hip fracture, initial encounter (HCC)- (present on admission)  Assessment & Plan  Mechanical fall down some stairs prior to admission.  CT pelvis shows right femoral neck fracture  S/p right hip fx repair on 1/21. Appreciate ortho recommendations.  Pain well controlled, continue current regimen. Wean IV medications as tolerated.  Motivated, may be appropriate candidate for inpatient rehab. Referral placed.    CHAPITO (acute kidney injury) (HCC)  Assessment & Plan  Developed postoperatively in conjunction with mild hypotension and acute blood loss anemia. Suspect hypovolemia.  LR bolus.   Monitor.    Acute blood loss anemia  Assessment & Plan  Hgb dropped from 14.6 to 12.7 postoperatively.  Iron studies.  Monitor.    CAD (coronary artery disease), native coronary artery  Assessment & Plan  From chart review patient appears of had CAD with a stent  placed in the past.  Unclear when this was placed however greater than 1 year ago.  Continue home atorvastatin.  Continue home ASA on discharge.    Elevated MCV  Assessment & Plan  .9.  B12 and folate ordered  TSH wnl.    Stage 3a chronic kidney disease (HCC)- (present on admission)  Assessment & Plan  History of CKD, eGFR ~60  Avoid nephrotoxic agents    Mixed hyperlipidemia- (present on admission)  Assessment & Plan  Continue statin    Insomnia- (present on admission)  Assessment & Plan  Continue melatonin       VTE prophylaxis: pharmacologic prophylaxis contraindicated due to Pending surgery    I have performed a physical exam and reviewed and updated ROS and Plan today (1/23/2022). In review of yesterday's note (1/22/2022), there are no changes except as documented above.

## 2022-01-23 NOTE — CARE PLAN
The patient is Stable - Low risk of patient condition declining or worsening    Shift Goals  Clinical Goals: ambulation  Patient Goals: pain contro  Family Goals: n/a     Progress made toward(s) clinical / shift goals:  Pt ambulated this morning. Pt has general weakness and tires quickly. See Cally KIM progress note.    Problem: Pain - Standard  Goal: Alleviation of pain or a reduction in pain to the patient’s comfort goal  Outcome: Progressing  Note: Provided pain medication as needed. See MAR      Problem: Knowledge Deficit - Standard  Goal: Patient and family/care givers will demonstrate understanding of plan of care, disease process/condition, diagnostic tests and medications  Outcome: Progressing  Note: Educated pt with POC. All questions answered     Problem: Skin Integrity  Goal: Skin integrity is maintained or improved  Outcome: Progressing     Problem: Fall Risk  Goal: Patient will remain free from falls  Note: Belongings within reach. Bed locked and at lowest position. Pt call appropriately.       Patient is not progressing towards the following goals:

## 2022-01-23 NOTE — PROGRESS NOTES
1608 Notified Carlo LEIGH of pts 95/77 BP with dizziness, irregular HR ranging from 60s to 80s. Received orders for EKG.

## 2022-01-23 NOTE — ASSESSMENT & PLAN NOTE
Likely secondary to acute blood loss anemia from surgery versus GI bleed.  Endoscopy was obtained, noted to have duodenal ulcer.  We will continue PPI twice daily  Avoid NSAID

## 2022-01-23 NOTE — CARE PLAN
Problem: Knowledge Deficit - Standard  Goal: Patient and family/care givers will demonstrate understanding of plan of care, disease process/condition, diagnostic tests and medications  1/22/2022 2141 by Pennie Carrillo R.N.  Outcome: Progressing  Note: Discussed POC with patient; pain management, posterior pain precautions, working with PT/OT today, , and discharge planning upon Pt/Ot recommendation. Patient verbalized understanding and is agreeable to POC. Encouraging pt to voice questions and concerns. Oriented pt to use of call light. Patient is capable of making needs known.     Problem: Pain - Standard  Goal: Alleviation of pain or a reduction in pain to the patient’s comfort goal  1/22/2022 2141 by Pennie Carrillo R.N.  Outcome: Progressing    Problem: Skin Integrity  Goal: Skin integrity is maintained or improved  1/22/2022 2141 by Pennie Carrillo R.N.  Outcome: Progressing  Note: [id number] in place. Sacrum intact.    Problem: Fall Risk  Goal: Patient will remain free from falls  1/22/2022 2141 by Pennie Carrillo R.N.  Outcome: Progressing  Note: Call light/belongings in reach, bed in low position and locked, front wheel walker out of sight, siderails up x 2, pt wearing non-slip footwear, adequate lighting, clutter free environment. Educated on level of fall risk, oriented to use of call light and encouraged pt to call before attempting to get out of bed.     The patient is Stable - Low risk of patient condition declining or worsening    Shift Goals  Clinical Goals: Posterior Hip Preacuations, Safety  Patient Goals: rest  Family Goals: ENMA

## 2022-01-23 NOTE — PROGRESS NOTES
"    BP (!) 94/62   Pulse 74   Temp 36.5 °C (97.7 °F) (Temporal)   Resp 15   Ht 1.803 m (5' 11\")   Wt 90.7 kg (200 lb)   SpO2 90%     Recent Labs     01/21/22  0625 01/22/22 2016   WBC 11.0* 11.4*   RBC 4.48* 3.64*   HEMOGLOBIN 15.7 12.7*   HEMATOCRIT 45.7 36.8*   .0* 101.1*   MCH 35.0* 34.9*   MCHC 34.4 34.5   RDW 45.9 44.3   PLATELETCT 151* 148*   MPV 9.7 9.9         POD#2  S/P Hemiarthroplasty    Plan:  DVT Prophylaxis- TEDS/SCDs, lovenox while in hospital, ASA 81 mg PO BID as outpatient  Weight Bearing Status-WBAT  PT/OT  Antibiotics: 24 hrs post op  Case Coordination          "

## 2022-01-23 NOTE — THERAPY
Physical Therapy   Initial Evaluation     Patient Name: Navin Suazo  Age:  89 y.o., Sex:  male  Medical Record #: [id number]  Today's Date: 1/22/2022     Precautions  Precautions: (P) Fall Risk;Posterior Hip Precautions;Weight Bearing As Tolerated Right Lower Extremity  Comments: (P) [place]     Assessment  Pt presents with impaired activity tolerance, dynamic balance, strength, ROM and HDR associated with GLF sustaining right femoral neck fx requiring hemiarthoplasty. Pt is most limited by acute pain, able to achieve sit<>stand with little assistance but limited by pain; could not tolerate shifting weight to arms for stepping but eager to try; greatest concerns today were HDR, reporting dizziness EOB BP 95/77, pt desating throughout mobility with 3L 86-90; currently would recommend PMR consult for acute rehab as pt is eager to progress and very functional at baseline; dtr likely coming to assist . Will follow.     Plan    Recommend Physical Therapy 5 times per week until therapy goals are met for the following treatments:  Bed Mobility, Equipment, Gait Training, Manual Therapy, Neuro Re-Education / Balance, Self Care/Home Evaluation, Sensory Integration Techniques, Stair Training, Therapeutic Activities and Therapeutic Exercises    DC Equipment Recommendations:  Unable to determine at this time         Abridged Subjective/Objective     01/22/22 1535   Prior Living Situation   Prior Services None   Housing / Facility 2 Story House  (split level)   Steps Into Home 9   Steps In Home 0   Bathroom Set up Walk In Shower   Equipment Owned Front-Wheel Walker;4-Wheel Walker   Lives with - Patient's Self Care Capacity Spouse   Comments spouse has PD and cannot assist; dtr may come to stay; pt reports falling down steps at a buddie's house after playing poker; 'i'm a born loser'    Prior Level of Functional Mobility   Bed Mobility Independent   Transfer Status Independent   Ambulation Independent   Distance Ambulation (Feet)    (to tolerance)   Assistive Devices Used None   Stairs Independent   Cognition    Cognition / Consciousness WDL   Level of Consciousness Alert   Comments pleasant and cooperative but very [place] and making inappropriate sexual jokes;    Passive ROM Lower Body   Passive ROM Lower Body X   Comments rigth hip ~ 80 deg with empty end feel; ankles rigid but functional    Strength Lower Body   Lower Body Strength  X   Comments right LE: hip flexion 1/5, pain inhibition; knee extension 2-/5 pain inhbition, DF 4/5; left LE: 4/5    Sensation Lower Body   Lower Extremity Sensation   WDL   Comments denies t/n   Balance Assessment   Sitting Balance (Static) Fair +   Sitting Balance (Dynamic) Fair   Standing Balance (Static) Fair -   Standing Balance (Dynamic) Poor +   Weight Shift Sitting Fair   Weight Shift Standing Poor   Comments B UEs upport in sitting/standing; needing assist for weight shifting for stepping    Gait Analysis   Weight Bearing Status WBAT R LE    Comments able to take one shuffled step forward and side stepped up bed but minimal weight bearing through right LE    Bed Mobility    Supine to Sit Moderate Assist  (slightly raised HOB; railing use)   Sit to Supine Moderate Assist  (for LEs)   Scooting Moderate Assist   Functional Mobility   Sit to Stand Minimal Assist  (with FWW x 3 reps )   Bed, Chair, Wheelchair Transfer Minimal Assist   Comments did not leave in chair due to concern for HDR, pulse pressure very small with dizziness reports; HR oscillating greater than 5 points with read   Edema / Skin Assessment   Edema / Skin  X   Comments incision dressing c/d/i; edema around hip but not distally;v good color and capillary return in right LE    Patient / Family Goals    Patient / Family Goal #1 to get up his entry steps    Short Term Goals    Short Term Goal # 1 Pt will perform supine<>sit from flat HOB/no railing with supervision within 6 visits to ensure independent mobility at home.    Short Term Goal # 2  Pt will perform sit<>stand with FWW and supervision within 6 visits to progress to independence.    Short Term Goal # 3 Pt will ambulate x 150ft with FWW and supervision within 6 visits to ensure independent mobility at home.    Short Term Goal # 4 Pt will ascend/descend 8 stairs with unilateral support and CGA within 6 visits to enter/exit home.    Education Group   Hip Precautions Posterior Patient Response Patient;Acceptance;Explanation;Demonstration;Handout;Verbal Demonstration;Action Demonstration;Reinforcement Needed   Role of Physical Therapist Patient Response Patient;Acceptance;Explanation;Demonstration;Action Demonstration;Verbal Demonstration   Gait Training Patient Response Patient;Acceptance;Explanation;Demonstration;Handout;Verbal Demonstration;Action Demonstration;Reinforcement Needed   Use of Assistive Device Patient Response Patient;Acceptance;Demonstration;Explanation;Action Demonstration;Verbal Demonstration   Exercises - Supine Patient Response Patient;Acceptance;Explanation;Demonstration;Handout;Verbal Demonstration;Action Demonstration  (ankle pumps, quad sets, heel slides )   Additional Comments recommendations for acute rehab

## 2022-01-24 LAB
ANION GAP SERPL CALC-SCNC: 12 MMOL/L (ref 7–16)
BUN SERPL-MCNC: 26 MG/DL (ref 8–22)
CALCIUM SERPL-MCNC: 8.3 MG/DL (ref 8.5–10.5)
CHLORIDE SERPL-SCNC: 103 MMOL/L (ref 96–112)
CO2 SERPL-SCNC: 25 MMOL/L (ref 20–33)
CREAT SERPL-MCNC: 1.01 MG/DL (ref 0.5–1.4)
ERYTHROCYTE [DISTWIDTH] IN BLOOD BY AUTOMATED COUNT: 45.8 FL (ref 35.9–50)
GLUCOSE SERPL-MCNC: 102 MG/DL (ref 65–99)
HCT VFR BLD AUTO: 37.4 % (ref 42–52)
HGB BLD-MCNC: 12.6 G/DL (ref 14–18)
IRON SATN MFR SERPL: 17 % (ref 15–55)
IRON SERPL-MCNC: 28 UG/DL (ref 50–180)
MCH RBC QN AUTO: 34.5 PG (ref 27–33)
MCHC RBC AUTO-ENTMCNC: 33.7 G/DL (ref 33.7–35.3)
MCV RBC AUTO: 102.5 FL (ref 81.4–97.8)
PLATELET # BLD AUTO: 186 K/UL (ref 164–446)
PMV BLD AUTO: 10.1 FL (ref 9–12.9)
POTASSIUM SERPL-SCNC: 3.8 MMOL/L (ref 3.6–5.5)
RBC # BLD AUTO: 3.65 M/UL (ref 4.7–6.1)
SODIUM SERPL-SCNC: 140 MMOL/L (ref 135–145)
TIBC SERPL-MCNC: 166 UG/DL (ref 250–450)
UIBC SERPL-MCNC: 138 UG/DL (ref 110–370)
WBC # BLD AUTO: 10.3 K/UL (ref 4.8–10.8)

## 2022-01-24 PROCEDURE — 700102 HCHG RX REV CODE 250 W/ 637 OVERRIDE(OP): Performed by: INTERNAL MEDICINE

## 2022-01-24 PROCEDURE — 700102 HCHG RX REV CODE 250 W/ 637 OVERRIDE(OP): Performed by: STUDENT IN AN ORGANIZED HEALTH CARE EDUCATION/TRAINING PROGRAM

## 2022-01-24 PROCEDURE — A9270 NON-COVERED ITEM OR SERVICE: HCPCS | Performed by: INTERNAL MEDICINE

## 2022-01-24 PROCEDURE — 80048 BASIC METABOLIC PNL TOTAL CA: CPT

## 2022-01-24 PROCEDURE — 700111 HCHG RX REV CODE 636 W/ 250 OVERRIDE (IP): Performed by: ORTHOPAEDIC SURGERY

## 2022-01-24 PROCEDURE — A9270 NON-COVERED ITEM OR SERVICE: HCPCS | Performed by: STUDENT IN AN ORGANIZED HEALTH CARE EDUCATION/TRAINING PROGRAM

## 2022-01-24 PROCEDURE — 99024 POSTOP FOLLOW-UP VISIT: CPT | Performed by: ORTHOPAEDIC SURGERY

## 2022-01-24 PROCEDURE — 99222 1ST HOSP IP/OBS MODERATE 55: CPT | Performed by: PHYSICAL MEDICINE & REHABILITATION

## 2022-01-24 PROCEDURE — 83550 IRON BINDING TEST: CPT

## 2022-01-24 PROCEDURE — 770001 HCHG ROOM/CARE - MED/SURG/GYN PRIV*

## 2022-01-24 PROCEDURE — 36415 COLL VENOUS BLD VENIPUNCTURE: CPT

## 2022-01-24 PROCEDURE — 99232 SBSQ HOSP IP/OBS MODERATE 35: CPT | Performed by: INTERNAL MEDICINE

## 2022-01-24 PROCEDURE — 83540 ASSAY OF IRON: CPT

## 2022-01-24 PROCEDURE — A9270 NON-COVERED ITEM OR SERVICE: HCPCS | Performed by: ORTHOPAEDIC SURGERY

## 2022-01-24 PROCEDURE — 700102 HCHG RX REV CODE 250 W/ 637 OVERRIDE(OP): Performed by: ORTHOPAEDIC SURGERY

## 2022-01-24 PROCEDURE — 85027 COMPLETE CBC AUTOMATED: CPT

## 2022-01-24 RX ORDER — TRAZODONE HYDROCHLORIDE 50 MG/1
50 TABLET ORAL
Status: DISCONTINUED | OUTPATIENT
Start: 2022-01-24 | End: 2022-01-27

## 2022-01-24 RX ORDER — OXYCODONE HYDROCHLORIDE 5 MG/1
5 TABLET ORAL
Status: DISCONTINUED | OUTPATIENT
Start: 2022-01-24 | End: 2022-02-02 | Stop reason: HOSPADM

## 2022-01-24 RX ORDER — FERROUS SULFATE 325(65) MG
325 TABLET ORAL
Status: DISCONTINUED | OUTPATIENT
Start: 2022-01-25 | End: 2022-02-02 | Stop reason: HOSPADM

## 2022-01-24 RX ORDER — OXYCODONE HYDROCHLORIDE 5 MG/1
2.5 TABLET ORAL
Status: DISCONTINUED | OUTPATIENT
Start: 2022-01-24 | End: 2022-02-02 | Stop reason: HOSPADM

## 2022-01-24 RX ADMIN — ENOXAPARIN SODIUM 40 MG: 40 INJECTION SUBCUTANEOUS at 05:15

## 2022-01-24 RX ADMIN — ACETAMINOPHEN 650 MG: 325 TABLET, FILM COATED ORAL at 23:30

## 2022-01-24 RX ADMIN — KETOROLAC TROMETHAMINE 15 MG: 30 INJECTION, SOLUTION INTRAMUSCULAR at 11:57

## 2022-01-24 RX ADMIN — DOCUSATE SODIUM 50 MG AND SENNOSIDES 8.6 MG 1 TABLET: 8.6; 5 TABLET, FILM COATED ORAL at 20:49

## 2022-01-24 RX ADMIN — DOCUSATE SODIUM 100 MG: 100 CAPSULE, LIQUID FILLED ORAL at 17:28

## 2022-01-24 RX ADMIN — KETOROLAC TROMETHAMINE 15 MG: 30 INJECTION, SOLUTION INTRAMUSCULAR at 05:14

## 2022-01-24 RX ADMIN — DOCUSATE SODIUM 100 MG: 100 CAPSULE, LIQUID FILLED ORAL at 05:14

## 2022-01-24 RX ADMIN — ACETAMINOPHEN 650 MG: 325 TABLET, FILM COATED ORAL at 11:57

## 2022-01-24 RX ADMIN — FOLIC ACID 1 MG: 1 TABLET ORAL at 05:14

## 2022-01-24 RX ADMIN — ACETAMINOPHEN 650 MG: 325 TABLET, FILM COATED ORAL at 05:14

## 2022-01-24 RX ADMIN — ACETAMINOPHEN 650 MG: 325 TABLET, FILM COATED ORAL at 17:28

## 2022-01-24 RX ADMIN — ONDANSETRON 4 MG: 2 INJECTION INTRAMUSCULAR; INTRAVENOUS at 12:01

## 2022-01-24 RX ADMIN — Medication 5 MG: at 20:50

## 2022-01-24 RX ADMIN — TRAZODONE HYDROCHLORIDE 50 MG: 50 TABLET ORAL at 20:49

## 2022-01-24 RX ADMIN — ATORVASTATIN CALCIUM 40 MG: 40 TABLET, FILM COATED ORAL at 05:14

## 2022-01-24 RX ADMIN — CYANOCOBALAMIN TAB 500 MCG 1000 MCG: 500 TAB at 05:14

## 2022-01-24 ASSESSMENT — LIFESTYLE VARIABLES
TOTAL SCORE: 0
TOTAL SCORE: 0
DOES PATIENT WANT TO STOP DRINKING: NO
AVERAGE NUMBER OF DAYS PER WEEK YOU HAVE A DRINK CONTAINING ALCOHOL: 7
CONSUMPTION TOTAL: NEGATIVE
HAVE PEOPLE ANNOYED YOU BY CRITICIZING YOUR DRINKING: NO
ALCOHOL_USE: YES
TOTAL SCORE: 0
HOW MANY TIMES IN THE PAST YEAR HAVE YOU HAD 5 OR MORE DRINKS IN A DAY: 0
EVER FELT BAD OR GUILTY ABOUT YOUR DRINKING: NO
ON A TYPICAL DAY WHEN YOU DRINK ALCOHOL HOW MANY DRINKS DO YOU HAVE: 1
EVER HAD A DRINK FIRST THING IN THE MORNING TO STEADY YOUR NERVES TO GET RID OF A HANGOVER: NO
HAVE YOU EVER FELT YOU SHOULD CUT DOWN ON YOUR DRINKING: NO

## 2022-01-24 ASSESSMENT — ENCOUNTER SYMPTOMS
CHILLS: 0
VOMITING: 0
SHORTNESS OF BREATH: 0
ABDOMINAL PAIN: 0
FEVER: 0
NAUSEA: 0

## 2022-01-24 ASSESSMENT — PAIN DESCRIPTION - PAIN TYPE
TYPE: ACUTE PAIN;SURGICAL PAIN
TYPE: SURGICAL PAIN
TYPE: SURGICAL PAIN
TYPE: ACUTE PAIN;SURGICAL PAIN
TYPE: SURGICAL PAIN

## 2022-01-24 ASSESSMENT — PAIN SCALES - GENERAL: PAIN_LEVEL: 5

## 2022-01-24 NOTE — PROGRESS NOTES
Spoke with daughter, Kaitlynn, this evening. She would like to be involved in the discussion regarding post acute care for her dad. If case management can call her tomorrow morning, it would be much appreciated. This RN will also pass info on to NOC RN to give to dayshift RN in AM. MD Matos also notified. Daughter's phone number is listed in sticky notes on summary tab.

## 2022-01-24 NOTE — DISCHARGE PLANNING
[facility] Coordination    Physiatry consult complete.  Dr. Chambers recommending -     Patient would benefit from aggressive OT and PT in acute inpatient rehabilitation. Daughter works virtually and plans to be available 24 hours a day upon final discharge     Estimated length of stay: 7-10 days      Call out to patients daughter Jessika Bedolla to discuss IRF referral/verify discharge support/destination.  Jessika lives in [place], currently in [place] to assist her Mother (who has advanced Parkinson's disease.) Jessika is not able to care for both her parents.  She is planning to return to [place] on Wednesday, will take her Mother with her.  Patient's home has 9 steps to enter front door, 12 from garage.  There are stairs throughout residence, 3 to laundry room, 6 steps to enter master bedroom.  Jessika reports limited discharge support for Navin.  Jessika states patient will need to be independent with stairs prior to returning home.  She does not anticipate her Mother will return back to residence, feels both patient and spouse will require additional caregiver assistance if they are to remain in their home.  Jessika is beginning to investigate options for that will allow both Kylegenoveva and Haritha to be together - possibly [facility].  Jessika's brother lives in [place], would be able to check-in on patient from time to time, there is no other family support at discharge. Jessika is aware [employer] will require prior auth for admission RR.  Advised would review updates with PMR.

## 2022-01-24 NOTE — CONSULTS
Physical Medicine and Rehabilitation Consultation              Date of initial consultation: 1/24/2022  Requested by: Terri Matos MD  Consulting physician: Ezekiel Vizcarra D.O.  Reason for consultation: assessment of rehabilitation needs  LOS: 5 Day(s)    Chief complaint: right hip fracture, pain improving    This history was prepared after interviewing the patient and reviewing the patient's chart at [facility].    HPI: The patient is a 89 y.o. male with a past medical history of coronary artery disease s/p stent, dyslipidemia, CKD3, hard of hearing;  who presented on 1/19/2022 10:33 PM after fall and found to have Mildly comminuted, angulated and displaced transcervical right femoral neck fracture. Per documentation, patient tripped down some stairs, fell down on the last step and hit the concrete on the right side earlier this evening.  Did not has hit his head, denies LOC, most of the pain is on the right way he also has some left hip pain as well. CT head, C-spine with no acute abnormality.  CT pelvis shows angulated and displaced transcervical right femoral neck fracture.      The patient underwent the following procedures:   Open treatment of right femoral fracture, proximal end, neck with prosthetic replacement on 1/21/2022 by Dr. Lloyd Roman MD. Weight bearing as tolerated with posterior hip precautions.    Physiatry was consulted to assess the patient's rehabilitation needs.    Today, patient reports: pain tolerable. Has not been sleeping well. Reports seeing bugs crawling and new it was not real. Has not been taking much narcotics. Has had low appetite due to Nausea. Had BM today and yesterday.    Goals for rehabilitation include: improving independence and going home safely.    Social and functional history:  Prior to this hospitalization, patient was independent with ADLS and mobility without an assistive device. Patient lives with  spouse in a two story home with 9  stairs to enter. Daughter works virtually and plans to be available 24 hours a day upon final discharge    Employment: retired, finance    Current function during therapy include:  Restrictions: weight bearing as tolerated in right lower limb with posterior hip precautions  PT: Functional mobility   Cognition    Cognition / Consciousness WDL   Level of Consciousness Alert   Comments pleasant and cooperative but very [place] and making inappropriate sexual jokes;    Passive ROM Lower Body   Passive ROM Lower Body X   Comments rigth hip ~ 80 deg with empty end feel; ankles rigid but functional    Strength Lower Body   Lower Body Strength  X   Comments right LE: hip flexion 1/5, pain inhibition; knee extension 2-/5 pain inhbition, DF 4/5; left LE: 4/5    Sensation Lower Body   Lower Extremity Sensation   WDL   Comments denies t/n   Balance Assessment   Sitting Balance (Static) Fair +   Sitting Balance (Dynamic) Fair   Standing Balance (Static) Fair -   Standing Balance (Dynamic) Poor +   Weight Shift Sitting Fair   Weight Shift Standing Poor   Comments B UEs upport in sitting/standing; needing assist for weight shifting for stepping    Gait Analysis   Weight Bearing Status WBAT R LE    Comments able to take one shuffled step forward and side stepped up bed but minimal weight bearing through right LE    Bed Mobility    Supine to Sit Moderate Assist  (slightly raised HOB; railing use)   Sit to Supine Moderate Assist  (for LEs)   Scooting Moderate Assist   Functional Mobility   Sit to Stand Minimal Assist  (with FWW x 3 reps )   Bed, Chair, Wheelchair Transfer Minimal Assist   Comments did not leave in chair due to concern for HDR, pulse pressure very small with dizziness reports; HR oscillating greater than 5 points with read   Edema / Skin Assessment   Edema / Skin  X   Comments incision dressing c/d/i; edema around hip but not distally;v good color and capillary return in right LE          OT: Activities of daily  living  Precautions   Precautions Fall Risk;Posterior Hip Precautions;Weight Bearing As Tolerated Right Lower Extremity   Comments [place]; hypotensive labile HR    Pain 0 - 10 Group   Location Hip   Location Orientation Right   Therapist Pain Assessment During Activity;Nurse Notified;4   Cognition    Cognition / Consciousness WDL   Level of Consciousness Alert   Comments pleasant and coopeartive but very [place]    Passive ROM Upper Body   Passive ROM Upper Body WDL   Active ROM Upper Body   Active ROM Upper Body  WDL   Strength Upper Body   Upper Body Strength  WDL   Sensation Upper Body   Upper Extremity Sensation  Not Tested   Upper Body Muscle Tone   Upper Body Muscle Tone  WDL   Neurological Concerns   Neurological Concerns No   Coordination Upper Body   Coordination WDL   Balance Assessment   Sitting Balance (Static) Fair +   Sitting Balance (Dynamic) Fair   Standing Balance (Static) Fair   Standing Balance (Dynamic) Fair -   Weight Shift Sitting Fair   Weight Shift Standing Poor   Comments w/fww    Bed Mobility    Supine to Sit Moderate Assist   Sit to Supine Moderate Assist   Scooting Moderate Assist   Rolling Minimal Assist to Rt.;Minimum Assist to Lt.   ADL Assessment   Grooming Contact Guard Assist;Seated   Upper Body Dressing Minimal Assist   Lower Body Dressing Maximal Assist   Toileting    (NT)   How much help from another person does the patient currently need...   6 Clicks Daily Activity Score 17   Functional Mobility   Sit to Stand Minimal Assist   Bed, Chair, Wheelchair Transfer Minimal Assist   Mobility EOB sit>stand x2 c/o dizziness low BP BTB      PMH:  Past Medical History:   Diagnosis Date   • Erectile dysfunction        PSH:  Past Surgical History:   Procedure Laterality Date   • PB PARTIAL HIP REPLACEMENT Right 1/21/2022    Procedure: HEMIARTHROPLASTY, HIP;  Surgeon: Lloyd Roman M.D.;  Location: SURGERY [facility];  Service: Orthopedics   • HERNIA REPAIR     • VASECTOMY         FHX:  Family  History   Problem Relation Age of Onset   • Other Mother    • Other Father        Medications:  Current Facility-Administered Medications   Medication Dose   • [START ON 1/25/2022] ferrous sulfate tablet 325 mg  325 mg   • traZODone (DESYREL) tablet 50 mg  50 mg   • enoxaparin (LOVENOX) inj 40 mg  40 mg   • Pharmacy Consult Request ...Pain Management Review 1 Each  1 Each   • ondansetron (ZOFRAN) syringe/vial injection 4 mg  4 mg   • dexamethasone (DECADRON) injection 4 mg  4 mg   • diphenhydrAMINE (BENADRYL) injection 25 mg  25 mg   • haloperidol lactate (HALDOL) injection 1 mg  1 mg   • scopolamine (TRANSDERM-SCOP) patch 1 Patch  1 Patch   • docusate sodium (COLACE) capsule 100 mg  100 mg   • senna-docusate (PERICOLACE or SENOKOT S) 8.6-50 MG per tablet 1 Tablet  1 Tablet   • senna-docusate (PERICOLACE or SENOKOT S) 8.6-50 MG per tablet 1 Tablet  1 Tablet   • polyethylene glycol/lytes (MIRALAX) PACKET 1 Packet  1 Packet   • magnesium hydroxide (MILK OF MAGNESIA) suspension 30 mL  30 mL   • bisacodyl (DULCOLAX) suppository 10 mg  10 mg   • sodium phosphate (Fleet) enema 133 mL  1 Each   • ketorolac (TORADOL) injection 15 mg  15 mg    Followed by   • ibuprofen (MOTRIN) tablet 800 mg  800 mg   • oxyCODONE immediate-release (ROXICODONE) tablet 5 mg  5 mg    Or   • oxyCODONE immediate release (ROXICODONE) tablet 10 mg  10 mg   • acetaminophen (Tylenol) tablet 650 mg  650 mg    Followed by   • [START ON 1/26/2022] acetaminophen (Tylenol) tablet 650 mg  650 mg   • labetalol (NORMODYNE/TRANDATE) injection 10 mg  10 mg   • atorvastatin (LIPITOR) tablet 40 mg  40 mg   • melatonin tablet 5 mg  5 mg   • folic acid (FOLVITE) tablet 1 mg  1 mg   • cyanocobalamin (VITAMIN B-12) tablet 1,000 mcg  1,000 mcg       Allergies:  No Known Allergies      Review of systems:  Constitutional: denies fevers and chills  Eyes: denies change in vision  Ears, nose, mouth, throat: denies sore throat  Cardiovascular: denies  "palpitations  Respiratory: denies respiratory discomfort  Gastrointestinal: continent, last today  Genitourinary: continent  Musculoskeletal: admits to pain at surgical site but manageable  Integumentary: denies pressure ulcer, has incision site from surgery  Neurological: denies spasms, denies burning or shooting pain, denies headaches, denies weakness in arms / legs  Psychiatric:  denies change in mood  Endocrine: denies diabetes mellitus  Hematologic/lymphatic: denies history of blood disorders  Allergic/immunologic: has no history of allergy to any known medications    Physical Exam:  Vitals: /75   Pulse 72   Temp 36.4 °C (97.5 °F) (Oral)   Resp 18   Ht 1.803 m (5' 11\")   Wt 90.7 kg (200 lb)   SpO2 92%   General: well-groomed in no acute distress, no visitors present  Eyes: no scleral icterus or conjunctival injection  Ears, nose, mouth and throat: moist oral mucosa  Cardiovascular: regular rate, good peripheral perfusion  Respiratory: breathing comfortably without use of accessory muscles, no NC  Gastrointestinal: soft, nontender, nondistended  Genitourinary: no indwelling leslie  Musculoskeletal: good symmetry in bilateral shoulders,  Skin: no wounds seen on exposed skin    Neurologic:  Mental status:  A&Ox4 (person, place, date, situation) answers questions appropriately follows commands  Speech: fluent, no aphasia or dysarthria  Motor:    Upper Extremity  Myotome R L   Shoulder flexion C5 5/5 5/5   Elbow flexion C5 5/5 5/5   Wrist extension C6 5/5 5/5   Elbow extension C7 5/5 5/5   Finger flexion C8 5/5 5/5   Finger abduction T1 5/5 5/5     Lower Extremity Myotome R L   Hip flexion L2 */5 5/5   Knee extension L3 4*/5 5/5   Ankle dorsiflexion L4 5/5 5/5   Toe extension L5 5/5 5/5   Ankle plantarflexion S1 5/5 5/5   *limited by pain    Sensory:   intact to light touch through out  Tone: no spasticity noted    Psychiatric: appropriate affect  Hematologic/lymphatic/immunologic: ++IV access, no bruises " seen on exposed skin    Labs: Reviewed and significant for   Recent Labs     01/22/22 2016 01/24/22  0539   RBC 3.64* 3.65*   HEMOGLOBIN 12.7* 12.6*   HEMATOCRIT 36.8* 37.4*   PLATELETCT 148* 186   IRON  --  28*   TOTIRONBC  --  166*     Recent Labs     01/22/22 2016 01/24/22  0539   SODIUM 137 140   POTASSIUM 4.1 3.8   CHLORIDE 103 103   CO2 22 25   GLUCOSE 132* 102*   BUN 40* 26*   CREATININE 1.46* 1.01   CALCIUM 7.9* 8.3*     Recent Results (from the past 24 hour(s))   IRON/TOTAL IRON BIND    Collection Time: 01/24/22  5:39 AM   Result Value Ref Range    Iron 28 (L) 50 - 180 ug/dL    Total Iron Binding 166 (L) 250 - 450 ug/dL    Unsat Iron Binding 138 110 - 370 ug/dL    % Saturation 17 15 - 55 %   CBC WITHOUT DIFFERENTIAL    Collection Time: 01/24/22  5:39 AM   Result Value Ref Range    WBC 10.3 4.8 - 10.8 K/uL    RBC 3.65 (L) 4.70 - 6.10 M/uL    Hemoglobin 12.6 (L) 14.0 - 18.0 g/dL    Hematocrit 37.4 (L) 42.0 - 52.0 %    .5 (H) 81.4 - 97.8 fL    MCH 34.5 (H) 27.0 - 33.0 pg    MCHC 33.7 33.7 - 35.3 g/dL    RDW 45.8 35.9 - 50.0 fL    Platelet Count 186 164 - 446 K/uL    MPV 10.1 9.0 - 12.9 fL   Basic Metabolic Panel    Collection Time: 01/24/22  5:39 AM   Result Value Ref Range    Sodium 140 135 - 145 mmol/L    Potassium 3.8 3.6 - 5.5 mmol/L    Chloride 103 96 - 112 mmol/L    Co2 25 20 - 33 mmol/L    Glucose 102 (H) 65 - 99 mg/dL    Bun 26 (H) 8 - 22 mg/dL    Creatinine 1.01 0.50 - 1.40 mg/dL    Calcium 8.3 (L) 8.5 - 10.5 mg/dL    Anion Gap 12.0 7.0 - 16.0   ESTIMATED GFR    Collection Time: 01/24/22  5:39 AM   Result Value Ref Range    GFR If African American >60 >60 mL/min/1.73 m 2    GFR If Non African American >60 >60 mL/min/1.73 m 2         ASSESSMENT:  IMPRESSION: The patient is a 89 y.o. male with a past medical history of coronary artery disease s/p stent, dyslipidemia, hard of hearing;  who presented on 1/19/2022 10:33 PM after fall and found to have Mildly comminuted, angulated and displaced  transcervical right femoral neck fracture.     [facility] Code: 0008.11 - Orthopaedic Disorders: Status Post Unilateral Hip Fracture  -With acute secondary complications of: impaired ADLs and mobility, posttraumatic and postsurgical pain  -with chronic conditions of: hard of hearing  -and:     Medical Complexity:  Macrocytosis    RECOMMENDATIONS:    ##MSK  #Impaired ADLs and mobility: Agree with continuing OT/PT while admitted here.    Patient would benefit from aggressive OT and PT in acute inpatient rehabilitation. Daughter works virtually and plans to be available 24 hours a day upon final discharge    Estimated length of stay: 7-10 days  Anticipated discharge destination: home with family  Prognosis: good    #Mildly comminuted, angulated and displaced transcervical right femoral neck fracture s/p Open treatment of right femoral fracture, proximal end, neck with prosthetic replacement on 1/21/2022 by Dr. Lloyd Roman MD.   Weight bearing as tolerated with posterior hip precautions.    #Posttraumatic pain, acute, controlled  #Postsurgical pain, acute, controlled  Ordered decrease to oxycodone 2.5-5mg Q3hr PRN pain  Agree with acetaminophen 650mg Q6hr, toradol 15mg Q6hr until 1/24/2022    ##PSYCH  #Hallucinations  Patient seeing bugs crawling - reports he tries to touch it and it goes through and is aware they are not real. Not taking much narcotics. Possibly related to anesthesia side effects. Also could be related to sleep deprivation    #Impaired sleep  Agree with trazodone 50mg QHS  Consider reducing or discontinuing melatonin as patient reports ineffective    ##GI  #Nausea  Likely related to medication side effect, possible from ketoralac - last day today  Last BM: today, continent  Consider trial of PPI (would avoid H2 blocker as patient having hallucinations)    ##  Continent    ##SKIN  #Incision site  Routine wound care    DVT chemoprophlaxis: lovenox 40mg daily  Code: full resuscitation    Thank you for  allowing us to participate in the care of this patient. Physiatry will continue to follow and provide recommendations, as needed.    Patient was seen for 58 minutes on unit/floor of which > 50% of time was spent on counseling and coordination of care regarding the above, including prognosis, risk reduction, benefits of treatment, and options for next stage of care.    Ezekiel Vizcarra D.O.   Physical Medicine and Rehabilitation     I have performed a physical exam and reviewed and updated history and plan today (1/24/2022).     Please note that this dictation was created using voice recognition software. I have made every reasonable attempt to correct obvious errors, but there may be errors of grammar and possibly content that I did not discover before finalizing the note.

## 2022-01-24 NOTE — PROGRESS NOTES
[facility] Medicine Daily Progress Note    Date of Service  1/24/2022    Chief Complaint  Navin Suazo is a 89 y.o. male admitted 1/19/2022 with right hip pain    Hospital Course  Navin Suazo is a 89 y.o. male who presented 1/19/2022 with fall.  PMH of dyslipidemia, CKD 3.  Patient tripped down some stairs, fell down on the last step and hit the concrete on the right side earlier this evening.  Did not has hit his head, denies LOC, most of the pain is on the right way he also has some left hip pain as well.  CT head, C-spine with no acute abnormality.  CT pelvis shows angulated and displaced transcervical right femoral neck fracture.  EDP spoke with Ortho who will evaluate the patient for surgery tomorrow.    Interval Problem Update  POD 3 s/p right hip fx repair.   BP normalized. CHAPITO resolved after IVF bolus. H/H stable.  Complaining of insomnia. Melatonin does not work for him. Trial trazodone.  Seeing distressing spider webs in the corners of the room and knows that they are not there. Provided reassurance.  Voiding well, last BM today. Delirium prevention measures.    Motivated, may be appropriate candidate for inpatient rehab. Referral pending.  His wife has parkinsons, is cared for by their daughter, who can not care for both.    I have personally seen and examined the patient at bedside. I discussed the plan of care with patient, family, bedside RN, charge RN,  and pharmacy.    Consultants/Specialty  orthopedics    Code Status  Full Code    Disposition  Patient is medically cleared pending placement for discharge.   Anticipate discharge to to skilled nursing facility vs inpatient rehab.  I have placed the appropriate orders for post-discharge needs.    Review of Systems  Review of Systems   Constitutional: Negative for chills and fever.   HENT: Positive for hearing loss.    Respiratory: Negative for shortness of breath.    Cardiovascular: Negative for chest pain and leg swelling.    Gastrointestinal: Negative for abdominal pain, nausea and vomiting.   Genitourinary: Negative for dysuria.   Musculoskeletal:        Right hip pain improved   All other systems reviewed and are negative.       Physical Exam  Temp:  [36.3 °C (97.3 °F)-36.9 °C (98.5 °F)] 36.4 °C (97.5 °F)  Pulse:  [68-86] 72  Resp:  [17-18] 18  BP: (100-133)/(57-75) 133/75  SpO2:  [90 %-100 %] 92 %    Physical Exam  Vitals and nursing note reviewed.   Constitutional:       General: He is not in acute distress.     Appearance: Normal appearance.   HENT:      Head: Normocephalic and atraumatic.      Right Ear: External ear normal.      Left Ear: External ear normal.      Nose: Nose normal. No congestion.      Mouth/Throat:      Mouth: Mucous membranes are moist.      Pharynx: Oropharynx is clear.   Eyes:      General: No scleral icterus.     Conjunctiva/sclera: Conjunctivae normal.   Cardiovascular:      Rate and Rhythm: Normal rate and regular rhythm.      Heart sounds: Normal heart sounds.   Pulmonary:      Effort: No respiratory distress.      Breath sounds: Normal breath sounds. No wheezing or rales.   Abdominal:      General: Abdomen is flat. Bowel sounds are normal. There is no distension.      Tenderness: There is no abdominal tenderness.   Musculoskeletal:      Cervical back: Neck supple. No rigidity.      Right lower leg: No edema.      Left lower leg: No edema.      Comments: Right hip surgical dressing in place, c/d/i without strikethrough. Surrounding skin nonerythematous, nonedematous, without ecchymosis.   Skin:     General: Skin is warm and dry.   Neurological:      Mental Status: He is alert and oriented to person, place, and time.      Cranial Nerves: No cranial nerve deficit.   Psychiatric:         Mood and Affect: Mood normal.         Thought Content: Thought content normal.         Judgment: Judgment normal.         Fluids    Intake/Output Summary (Last 24 hours) at 1/24/2022 1020  Last data filed at 1/24/2022  0551  Gross per 24 hour   Intake --   Output 400 ml   Net -400 ml       Laboratory  Recent Labs     01/22/22 2016 01/24/22  0539   WBC 11.4* 10.3   RBC 3.64* 3.65*   HEMOGLOBIN 12.7* 12.6*   HEMATOCRIT 36.8* 37.4*   .1* 102.5*   MCH 34.9* 34.5*   MCHC 34.5 33.7   RDW 44.3 45.8   PLATELETCT 148* 186   MPV 9.9 10.1     Recent Labs     01/22/22 2016 01/24/22  0539   SODIUM 137 140   POTASSIUM 4.1 3.8   CHLORIDE 103 103   CO2 22 25   GLUCOSE 132* 102*   BUN 40* 26*   CREATININE 1.46* 1.01   CALCIUM 7.9* 8.3*                   Imaging  DX-CHEST-LIMITED (1 VIEW)   Final Result      1.  Cardiomegaly.   2.  Slight interstitial prominence. No consolidation or pleural effusion.      DX-FEMUR-2+ RIGHT   Final Result      Mildly angulated and displaced transcervical right femoral neck fracture.      DX-PELVIS-1 OR 2 VIEWS   Final Result      Mildly displaced transcervical right femoral neck fracture.      CT-PELVIS W/O PLUS RECONS   Final Result      Mildly comminuted, angulated and displaced transcervical right femoral neck fracture.      CT-HEAD W/O   Final Result      1.  Chronic ischemic changes.   2.  No acute intracranial abnormality.         CT-CSPINE WITHOUT PLUS RECONS   Final Result      1.  No acute fracture or dislocation of the cervical spine.   2.  Mild degeneration.   3.  Severe carotid calcified plaque.           Assessment/Plan  * Closed right hip fracture, initial encounter (HCC)- (present on admission)  Assessment & Plan  Mechanical fall down some stairs prior to admission.  CT pelvis shows right femoral neck fracture  S/p right hip fx repair on 1/21. Appreciate ortho recommendations.  Pain well controlled, continue current regimen. Wean IV medications as tolerated.  Motivated, may be appropriate candidate for inpatient rehab. Referral pending.    CHAPITO (acute kidney injury) (HCC)  Assessment & Plan  Developed postoperatively in conjunction with mild hypotension and acute blood loss anemia. Suspect  hypovolemia.  Resolved after LR bolus.   Monitor.    Acute blood loss anemia  Assessment & Plan  Hgb dropped from 14.6 to 12.7 postoperatively.  Iron studies reveal low iron, TIBC. Supplement iron.  Monitor.    CAD (coronary artery disease), native coronary artery- (present on admission)  Assessment & Plan  From chart review patient appears of had CAD with a stent placed in the past.  Unclear when this was placed however greater than 1 year ago.  Continue home atorvastatin.  Continue home ASA on discharge.    Elevated MCV  Assessment & Plan  .9.  B12 and folate ordered  TSH wnl.    Stage 3a chronic kidney disease (HCC)- (present on admission)  Assessment & Plan  History of CKD, eGFR ~60  Avoid nephrotoxic agents    Mixed hyperlipidemia- (present on admission)  Assessment & Plan  Continue statin    Insomnia- (present on admission)  Assessment & Plan  May contribute to delirium.   Continue melatonin.  Trial trazodone.       VTE prophylaxis: pharmacologic prophylaxis contraindicated due to Pending surgery    I have performed a physical exam and reviewed and updated ROS and Plan today (1/24/2022). In review of yesterday's note (1/23/2022), there are no changes except as documented above.    Interventions to be considered in all patients in order to minimize the risk of delirium.   -do not disturb patient (vitals or lab draws) between the hours of 10 PM and 6 AM.  -ideally the patient should not sleep during the day and we should avoid day time naps.   -up in chair for meals  -ambulate at least three times daily, as able  -watch for constipation  -timed voiding - ask patient is she would like to go to the bathroom q 2-3 hours, except during the do not disturb hours.   -remove all necessary lines (central lines, peripheral IVs, feeding tubes, leslie catheters)  -unless patient has shown harm to self or others I would recommend against use of restraints - either chemical or physical (antipsychotics)   -minimize  polypharmacy, do not dose medication during sleep hours

## 2022-01-24 NOTE — PROGRESS NOTES
Patient called for bedpan to have a bowel movement. RN and graduate RN helped patient up to bathroom. Patient very unsteady after 7-10 steps. Patient did make it to bathroom after 4 - 5 minutes, but could not make it back to bed. RN retrieved wheelchair, and patient pivoted from toilet to chair and was wheeled back to bed. Patient has not gotten out of bed since that time.

## 2022-01-24 NOTE — DISCHARGE PLANNING
[facility] Transitional Care Coordination    Referral from:  Dr. Terri Mtaos  Insurance Provider on Facesheet: [employer]  Potential Rehab Diagnosis: Unilateral hip fracture    Chart review indicates patient has on going medical management and therapy needs to possibly meet inpatient rehab facility criteria with the goal of returning to community.    D/C support: To be determined     Physiatry to consult.  Right hip fracture, POD 3 surgical repair    Last Covid test:  1/19/2022 not detected     Thank you for the referral.

## 2022-01-24 NOTE — CARE PLAN
The patient is Stable - Low risk of patient condition declining or worsening    Shift Goals  Clinical Goals: mobility, safety  Patient Goals: pain mgmt  Family Goals: n/a     Progress made toward(s) clinical / shift goals:      Problem: Pain - Standard  Goal: Alleviation of pain or a reduction in pain to the patient’s comfort goal  Outcome: Progressing  Flowsheets  Taken 1/23/2022 2139 by Kandace Tan RAureliaN.  OB Pain Intervention:   Repositioned   Rest  Taken 1/23/2022 1253 by Amanda Damico R.NAurelia  Pain Rating Scale (NPRS): 2     Problem: Knowledge Deficit - Standard  Goal: Patient and family/care givers will demonstrate understanding of plan of care, disease process/condition, diagnostic tests and medications  Outcome: Progressing  Note: POC discussed with pt at bedside. Pt asks appropriate questions, no additional concerns at this time.       Problem: Skin Integrity  Goal: Skin integrity is maintained or improved  Outcome: Progressing  Note: Pt able to reposition self in bed, frequent moisture/incontinent checks.     Problem: Fall Risk  Goal: Patient will remain free from falls  Outcome: Progressing  Note: Bed is locked and in lowest position, treaded socks on, bed alarm on, DME out of sight, call light and belongings within reach, pt calls appropriately for assistance, hourly rounding in place.         Patient is not progressing towards the following goals:

## 2022-01-24 NOTE — ANESTHESIA POSTPROCEDURE EVALUATION
Patient: Navin Suazo    Procedure Summary     Date: 01/21/22 Room / Location: [place] [address] / SURGERY [facility]    Anesthesia Start: 1632 Anesthesia Stop: 1729    Procedure: HEMIARTHROPLASTY, HIP (Right Hip) Diagnosis: (RIGHT HIP FRACTURE)    Surgeons: Lloyd Roman M.D. Responsible Provider: Hernesto Colin M.D.    Anesthesia Type: general ASA Status: 3 - Emergent          Final Anesthesia Type: general  Last vitals  BP   Blood Pressure : 133/75    Temp   36.4 °C (97.5 °F)    Pulse   72   Resp   18    SpO2   92 %      Anesthesia Post Evaluation    Patient location during evaluation: PACU  Patient participation: complete - patient participated  Level of consciousness: awake and alert  Pain score: 5    Airway patency: patent  Anesthetic complications: no  Cardiovascular status: hemodynamically stable  Respiratory status: acceptable  Hydration status: euvolemic    PONV: none          No complications documented.

## 2022-01-25 PROBLEM — G93.40 ENCEPHALOPATHY: Status: ACTIVE | Noted: 2022-01-25

## 2022-01-25 LAB
AMMONIA PLAS-SCNC: <10 UMOL/L (ref 11–45)
ANION GAP SERPL CALC-SCNC: 16 MMOL/L (ref 7–16)
BASOPHILS # BLD AUTO: 0.4 % (ref 0–1.8)
BASOPHILS # BLD: 0.04 K/UL (ref 0–0.12)
BUN SERPL-MCNC: 23 MG/DL (ref 8–22)
CALCIUM SERPL-MCNC: 8.4 MG/DL (ref 8.5–10.5)
CHLORIDE SERPL-SCNC: 102 MMOL/L (ref 96–112)
CO2 SERPL-SCNC: 20 MMOL/L (ref 20–33)
CREAT SERPL-MCNC: 0.91 MG/DL (ref 0.5–1.4)
EOSINOPHIL # BLD AUTO: 0.09 K/UL (ref 0–0.51)
EOSINOPHIL NFR BLD: 1 % (ref 0–6.9)
ERYTHROCYTE [DISTWIDTH] IN BLOOD BY AUTOMATED COUNT: 44.8 FL (ref 35.9–50)
GLUCOSE SERPL-MCNC: 120 MG/DL (ref 65–99)
HCT VFR BLD AUTO: 35.9 % (ref 42–52)
HGB BLD-MCNC: 12.2 G/DL (ref 14–18)
IMM GRANULOCYTES # BLD AUTO: 0.05 K/UL (ref 0–0.11)
IMM GRANULOCYTES NFR BLD AUTO: 0.5 % (ref 0–0.9)
LYMPHOCYTES # BLD AUTO: 0.83 K/UL (ref 1–4.8)
LYMPHOCYTES NFR BLD: 9.1 % (ref 22–41)
MCH RBC QN AUTO: 34.5 PG (ref 27–33)
MCHC RBC AUTO-ENTMCNC: 34 G/DL (ref 33.7–35.3)
MCV RBC AUTO: 101.4 FL (ref 81.4–97.8)
MONOCYTES # BLD AUTO: 1 K/UL (ref 0–0.85)
MONOCYTES NFR BLD AUTO: 10.9 % (ref 0–13.4)
NEUTROPHILS # BLD AUTO: 7.14 K/UL (ref 1.82–7.42)
NEUTROPHILS NFR BLD: 78.1 % (ref 44–72)
NRBC # BLD AUTO: 0 K/UL
NRBC BLD-RTO: 0 /100 WBC
PLATELET # BLD AUTO: 212 K/UL (ref 164–446)
PMV BLD AUTO: 10.7 FL (ref 9–12.9)
POTASSIUM SERPL-SCNC: 3.6 MMOL/L (ref 3.6–5.5)
RBC # BLD AUTO: 3.54 M/UL (ref 4.7–6.1)
SODIUM SERPL-SCNC: 138 MMOL/L (ref 135–145)
WBC # BLD AUTO: 9.2 K/UL (ref 4.8–10.8)

## 2022-01-25 PROCEDURE — 99233 SBSQ HOSP IP/OBS HIGH 50: CPT | Performed by: INTERNAL MEDICINE

## 2022-01-25 PROCEDURE — 700102 HCHG RX REV CODE 250 W/ 637 OVERRIDE(OP): Performed by: INTERNAL MEDICINE

## 2022-01-25 PROCEDURE — 700102 HCHG RX REV CODE 250 W/ 637 OVERRIDE(OP): Performed by: STUDENT IN AN ORGANIZED HEALTH CARE EDUCATION/TRAINING PROGRAM

## 2022-01-25 PROCEDURE — A9270 NON-COVERED ITEM OR SERVICE: HCPCS | Performed by: STUDENT IN AN ORGANIZED HEALTH CARE EDUCATION/TRAINING PROGRAM

## 2022-01-25 PROCEDURE — 36415 COLL VENOUS BLD VENIPUNCTURE: CPT

## 2022-01-25 PROCEDURE — 81001 URINALYSIS AUTO W/SCOPE: CPT

## 2022-01-25 PROCEDURE — 85025 COMPLETE CBC W/AUTO DIFF WBC: CPT

## 2022-01-25 PROCEDURE — 97535 SELF CARE MNGMENT TRAINING: CPT

## 2022-01-25 PROCEDURE — A9270 NON-COVERED ITEM OR SERVICE: HCPCS | Performed by: ORTHOPAEDIC SURGERY

## 2022-01-25 PROCEDURE — 82140 ASSAY OF AMMONIA: CPT

## 2022-01-25 PROCEDURE — 97535 SELF CARE MNGMENT TRAINING: CPT | Mod: CQ

## 2022-01-25 PROCEDURE — 97530 THERAPEUTIC ACTIVITIES: CPT

## 2022-01-25 PROCEDURE — A9270 NON-COVERED ITEM OR SERVICE: HCPCS | Performed by: INTERNAL MEDICINE

## 2022-01-25 PROCEDURE — 80048 BASIC METABOLIC PNL TOTAL CA: CPT

## 2022-01-25 PROCEDURE — 700102 HCHG RX REV CODE 250 W/ 637 OVERRIDE(OP): Performed by: ORTHOPAEDIC SURGERY

## 2022-01-25 PROCEDURE — 700111 HCHG RX REV CODE 636 W/ 250 OVERRIDE (IP): Performed by: ORTHOPAEDIC SURGERY

## 2022-01-25 PROCEDURE — 770001 HCHG ROOM/CARE - MED/SURG/GYN PRIV*

## 2022-01-25 PROCEDURE — 97530 THERAPEUTIC ACTIVITIES: CPT | Mod: CQ

## 2022-01-25 RX ADMIN — FERROUS SULFATE TAB 325 MG (65 MG ELEMENTAL FE) 325 MG: 325 (65 FE) TAB at 09:02

## 2022-01-25 RX ADMIN — DOCUSATE SODIUM 50 MG AND SENNOSIDES 8.6 MG 1 TABLET: 8.6; 5 TABLET, FILM COATED ORAL at 21:16

## 2022-01-25 RX ADMIN — FOLIC ACID 1 MG: 1 TABLET ORAL at 04:42

## 2022-01-25 RX ADMIN — ENOXAPARIN SODIUM 40 MG: 40 INJECTION SUBCUTANEOUS at 04:49

## 2022-01-25 RX ADMIN — TRAZODONE HYDROCHLORIDE 50 MG: 50 TABLET ORAL at 21:16

## 2022-01-25 RX ADMIN — ACETAMINOPHEN 650 MG: 325 TABLET, FILM COATED ORAL at 04:42

## 2022-01-25 RX ADMIN — DOCUSATE SODIUM 100 MG: 100 CAPSULE, LIQUID FILLED ORAL at 18:35

## 2022-01-25 RX ADMIN — Medication 5 MG: at 21:16

## 2022-01-25 RX ADMIN — ACETAMINOPHEN 650 MG: 325 TABLET, FILM COATED ORAL at 18:35

## 2022-01-25 RX ADMIN — CYANOCOBALAMIN TAB 500 MCG 1000 MCG: 500 TAB at 04:43

## 2022-01-25 RX ADMIN — ATORVASTATIN CALCIUM 40 MG: 40 TABLET, FILM COATED ORAL at 04:43

## 2022-01-25 RX ADMIN — ACETAMINOPHEN 650 MG: 325 TABLET, FILM COATED ORAL at 23:46

## 2022-01-25 RX ADMIN — DOCUSATE SODIUM 100 MG: 100 CAPSULE, LIQUID FILLED ORAL at 04:43

## 2022-01-25 ASSESSMENT — COGNITIVE AND FUNCTIONAL STATUS - GENERAL
HELP NEEDED FOR BATHING: A LOT
DAILY ACTIVITIY SCORE: 14
SUGGESTED CMS G CODE MODIFIER MOBILITY: CL
TOILETING: A LOT
CLIMB 3 TO 5 STEPS WITH RAILING: TOTAL
DRESSING REGULAR UPPER BODY CLOTHING: A LITTLE
MOVING TO AND FROM BED TO CHAIR: UNABLE
MOBILITY SCORE: 10
MOVING FROM LYING ON BACK TO SITTING ON SIDE OF FLAT BED: UNABLE
WALKING IN HOSPITAL ROOM: A LOT
SUGGESTED CMS G CODE MODIFIER DAILY ACTIVITY: CK
EATING MEALS: A LITTLE
STANDING UP FROM CHAIR USING ARMS: A LITTLE
PERSONAL GROOMING: A LOT
DRESSING REGULAR LOWER BODY CLOTHING: A LOT
TURNING FROM BACK TO SIDE WHILE IN FLAT BAD: A LOT

## 2022-01-25 ASSESSMENT — GAIT ASSESSMENTS: GAIT LEVEL OF ASSIST: UNABLE TO PARTICIPATE

## 2022-01-25 ASSESSMENT — ENCOUNTER SYMPTOMS
ABDOMINAL PAIN: 0
NAUSEA: 0
FEVER: 0
VOMITING: 0
SHORTNESS OF BREATH: 0
CHILLS: 0

## 2022-01-25 ASSESSMENT — PAIN DESCRIPTION - PAIN TYPE: TYPE: SURGICAL PAIN

## 2022-01-25 NOTE — PROGRESS NOTES
"   Orthopaedic PA Progress Note    Interval changes:Clear for disposition (home/SNF/IRF) from Orthopaedic team standpoint.      ROS - Patient denies any new issues. No chest pain, dyspnea, or fever.  Pain well controlled.    /66   Pulse 66   Temp 36.8 °C (98.3 °F) (Temporal)   Resp 18   Ht 1.803 m (5' 11\")   Wt 90.7 kg (200 lb)   SpO2 94%     Patient seen and examined  No acute distress  Breathing non labored  RRR  Surgical dressing is clean, dry, and intact. Patient clearly fires tibialis anterior, EHL, and gastrocnemius/soleus. Sensation is intact to light touch throughout superficial peroneal, deep peroneal, tibial, saphenous, and sural nerve distributions. Strong and palpable 2+ dorsalis pedis and posterior tibial pulses with capillary refill less than 2 seconds. No lower leg tenderness or discomfort.    Recent Labs     01/22/22 2016 01/24/22  0539   WBC 11.4* 10.3   RBC 3.64* 3.65*   HEMOGLOBIN 12.7* 12.6*   HEMATOCRIT 36.8* 37.4*   .1* 102.5*   MCH 34.9* 34.5*   MCHC 34.5 33.7   RDW 44.3 45.8   PLATELETCT 148* 186   MPV 9.9 10.1     Active Hospital Problems    Diagnosis    • Acute blood loss anemia [D62]    • CHAPITO (acute kidney injury) ([facility]) [N17.9]    • Stage 3a chronic kidney disease ([facility]) [N18.31]    • Elevated MCV [R71.8]    • CAD (coronary artery disease), native coronary artery [I25.10]    • Closed right hip fracture, initial encounter ([facility]) [S72.001A]    • Mixed hyperlipidemia [E78.2]    • Insomnia [G47.00]      Assessment/Plan:  POD#3 S.P R hip kris  Wt bearing status - AT  PT/OT-initiated  Wound care:Mepilex - left in place. May change on POD#7, sooner PRN  Drains - no  Raya-no  Sutures/Staples out- 10-14 days post operatively  Antibiotics: complete  DVT Prophylaxis- TEDS/SCDs/Foot pumps.   Lovenox: 40 mg daily  ASA on discharge OK  Future Procedures - none  Case Coordination for Discharge Planning - Disposition Follow-Up: needs appointment with Dr. Roman at [facility] at 10-14 days post-op for re-evaluation, staple removal and radiographs.

## 2022-01-25 NOTE — CARE PLAN
The patient is Stable - Low risk of patient condition declining or worsening    Shift Goals  Clinical Goals: improved activity tolerance, pain control  Patient Goals: rest, pain mangement  Family Goals: n/a     Progress made toward(s) clinical / shift goals:  Out of bed with FWW and 1 person assist/SBA. Pain improving to right hip.      Problem: Pain - Standard  Goal: Alleviation of pain or a reduction in pain to the patient’s comfort goal  Outcome: Progressing     Problem: Skin Integrity  Goal: Skin integrity is maintained or improved  Outcome: Progressing     Problem: Fall Risk  Goal: Patient will remain free from falls  Outcome: Progressing       Patient is not progressing towards the following goals:

## 2022-01-25 NOTE — PROGRESS NOTES
[facility] Medicine Daily Progress Note    Date of Service  1/25/2022    Chief Complaint  Navin Suazo is a 89 y.o. male admitted 1/19/2022 with right hip pain    Hospital Course  Navin Suazo is a 89 y.o. male who presented 1/19/2022 with fall.  PMH of dyslipidemia, CKD 3.  Patient tripped down some stairs, fell down on the last step and hit the concrete on the right side earlier this evening.  Did not has hit his head, denies LOC, most of the pain is on the right way he also has some left hip pain as well.  CT head, C-spine with no acute abnormality.  CT pelvis shows angulated and displaced transcervical right femoral neck fracture.  EDP spoke with Ortho who will evaluate the patient for surgery tomorrow.    Interval Problem Update  POD 3 s/p right hip fx repair.   BP normalized. CHAPITO resolved after IVF bolus. H/H stable.  Complaining of insomnia. Melatonin does not work for him. Trial trazodone.  Seeing distressing spider webs in the corners of the room and knows that they are not there. Provided reassurance.  Voiding well, last BM today. Delirium prevention measures.    Motivated, may be appropriate candidate for inpatient rehab. Referral pending.  His wife has parkinsons, is cared for by their daughter, who can not care for both.    1/25-he is slight agitated. Remain cooperative. He believes that his daughter is stuck in a room. He talks about PD. He hopes to talk to director from [facility]. He called his son later telling to his son that he has bugs on his room. Spoke to his daughter. This is not his baseline. He drinks alcohol 5 shots whisk a week, and a glass of wine every dinner.     I have personally seen and examined the patient at bedside. I discussed the plan of care with patient, family, bedside RN, charge RN,  and pharmacy.    Consultants/Specialty  orthopedics    Code Status  Full Code    Disposition  Patient is medically cleared pending placement for discharge.   Anticipate  discharge to to skilled nursing facility vs inpatient rehab.  I have placed the appropriate orders for post-discharge needs.    Review of Systems  Review of Systems   Constitutional: Negative for chills and fever.   HENT: Positive for hearing loss.    Respiratory: Negative for shortness of breath.    Cardiovascular: Negative for chest pain and leg swelling.   Gastrointestinal: Negative for abdominal pain, nausea and vomiting.   Genitourinary: Negative for dysuria.   Musculoskeletal:        Right hip pain improved   All other systems reviewed and are negative.       Physical Exam  Temp:  [36 °C (96.8 °F)-36.9 °C (98.4 °F)] 36 °C (96.8 °F)  Pulse:  [66-89] 86  Resp:  [17-19] 19  BP: (112-123)/(66-77) 123/77  SpO2:  [93 %-94 %] 94 %    Physical Exam  Vitals and nursing note reviewed.   Constitutional:       General: He is not in acute distress.     Appearance: Normal appearance.   HENT:      Head: Normocephalic and atraumatic.      Right Ear: External ear normal.      Left Ear: External ear normal.      Nose: Nose normal. No congestion.      Mouth/Throat:      Mouth: Mucous membranes are moist.      Pharynx: Oropharynx is clear.   Eyes:      General: No scleral icterus.     Conjunctiva/sclera: Conjunctivae normal.   Cardiovascular:      Rate and Rhythm: Normal rate and regular rhythm.      Heart sounds: Normal heart sounds.   Pulmonary:      Effort: No respiratory distress.      Breath sounds: Normal breath sounds. No wheezing or rales.   Abdominal:      General: Abdomen is flat. Bowel sounds are normal. There is no distension.      Tenderness: There is no abdominal tenderness.   Musculoskeletal:      Cervical back: Neck supple. No rigidity.      Right lower leg: No edema.      Left lower leg: No edema.      Comments: Right hip surgical dressing in place, c/d/i without strikethrough. Surrounding skin nonerythematous, nonedematous, without ecchymosis.   Skin:     General: Skin is warm and dry.   Neurological:       Mental Status: He is alert and oriented to person, place, and time.      Cranial Nerves: No cranial nerve deficit.   Psychiatric:         Mood and Affect: Mood normal.         Thought Content: Thought content normal.         Judgment: Judgment normal.         Fluids  No intake or output data in the 24 hours ending 01/25/22 1443    Laboratory  Recent Labs     01/22/22 2016 01/24/22  0539   WBC 11.4* 10.3   RBC 3.64* 3.65*   HEMOGLOBIN 12.7* 12.6*   HEMATOCRIT 36.8* 37.4*   .1* 102.5*   MCH 34.9* 34.5*   MCHC 34.5 33.7   RDW 44.3 45.8   PLATELETCT 148* 186   MPV 9.9 10.1     Recent Labs     01/22/22 2016 01/24/22  0539 01/25/22  0837   SODIUM 137 140 138   POTASSIUM 4.1 3.8 3.6   CHLORIDE 103 103 102   CO2 22 25 20   GLUCOSE 132* 102* 120*   BUN 40* 26* 23*   CREATININE 1.46* 1.01 0.91   CALCIUM 7.9* 8.3* 8.4*                   Imaging  DX-CHEST-LIMITED (1 VIEW)   Final Result      1.  Cardiomegaly.   2.  Slight interstitial prominence. No consolidation or pleural effusion.      DX-FEMUR-2+ RIGHT   Final Result      Mildly angulated and displaced transcervical right femoral neck fracture.      DX-PELVIS-1 OR 2 VIEWS   Final Result      Mildly displaced transcervical right femoral neck fracture.      CT-PELVIS W/O PLUS RECONS   Final Result      Mildly comminuted, angulated and displaced transcervical right femoral neck fracture.      CT-HEAD W/O   Final Result      1.  Chronic ischemic changes.   2.  No acute intracranial abnormality.         CT-CSPINE WITHOUT PLUS RECONS   Final Result      1.  No acute fracture or dislocation of the cervical spine.   2.  Mild degeneration.   3.  Severe carotid calcified plaque.           Assessment/Plan  * Closed right hip fracture, initial encounter (HCC)- (present on admission)  Assessment & Plan  Mechanical fall down some stairs prior to admission.  CT pelvis shows right femoral neck fracture  S/p right hip fx repair on 1/21. Appreciate ortho recommendations.  Pain well  controlled, continue current regimen. Wean IV medications as tolerated.  Motivated, may be appropriate candidate for inpatient rehab. Referral pending.  1/25- he is taking NSAIDs only. No narcotic. Continue to monitor    Encephalopathy- (present on admission)  Assessment & Plan  1/25- this is not his baseline. Talked to his daughter. + visual hallucinations   UA to rule out UTI. Not symptomatic   Cannot rule out alcoholic withdrawal ??  No neuro deficits   Also hospital delirium is on differential.   Otherwise pt has no history of dementia per his daughter and he function well with the help of his daughter at home prior to this   Will consider brain MRI if not improving in couple of days     CHAPITO (acute kidney injury) (HCC)  Assessment & Plan  Developed postoperatively in conjunction with mild hypotension and acute blood loss anemia. Suspect hypovolemia.  Resolved after LR bolus.   Monitor.    Acute blood loss anemia  Assessment & Plan  Hgb dropped from 14.6 to 12.7 postoperatively.  Iron studies reveal low iron, TIBC. Supplement iron.  Monitor.    CAD (coronary artery disease), native coronary artery- (present on admission)  Assessment & Plan  From chart review patient appears of had CAD with a stent placed in the past.  Unclear when this was placed however greater than 1 year ago.  Continue home atorvastatin.  Continue home ASA on discharge.    Elevated MCV- (present on admission)  Assessment & Plan  .9.  B12 and folate ordered  TSH wnl.    Stage 3a chronic kidney disease (HCC)- (present on admission)  Assessment & Plan  History of CKD, eGFR ~60  Avoid nephrotoxic agents    Mixed hyperlipidemia- (present on admission)  Assessment & Plan  Continue statin    Insomnia- (present on admission)  Assessment & Plan  May contribute to delirium.   Continue melatonin.  Trial trazodone.       VTE prophylaxis: pharmacologic prophylaxis contraindicated due to Pending surgery    I have performed a physical exam and  reviewed and updated ROS and Plan today (1/25/2022). In review of yesterday's note (1/24/2022), there are no changes except as documented above.    Interventions to be considered in all patients in order to minimize the risk of delirium.   -do not disturb patient (vitals or lab draws) between the hours of 10 PM and 6 AM.  -ideally the patient should not sleep during the day and we should avoid day time naps.   -up in chair for meals  -ambulate at least three times daily, as able  -watch for constipation  -timed voiding - ask patient is she would like to go to the bathroom q 2-3 hours, except during the do not disturb hours.   -remove all necessary lines (central lines, peripheral IVs, feeding tubes, leslie catheters)  -unless patient has shown harm to self or others I would recommend against use of restraints - either chemical or physical (antipsychotics)   -minimize polypharmacy, do not dose medication during sleep hours

## 2022-01-25 NOTE — THERAPY
"Occupational Therapy  Daily Treatment     Patient Name: Navin Suazo  Age:  89 y.o., Sex:  male  Medical Record #: [id number]  Today's Date: 1/25/2022     Precautions: Fall Risk,Posterior Hip Precautions,Weight Bearing As Tolerated Right Lower Extremity    Assessment    Pt seen for OT tx to include: bed mobility, transfer training, toileting, LB dressing, seated grooming. Required max verbal cues to maintain posterior hip precautions throughout session. Introduced reacher and sock-aid. Pt required max A to utilize devices for compensatory LB dressing (difficulty with new learning & sequencing). Pt had significant difficulty sequencing oral care task, requiring step-by-step instruction and mod assist. Pt demos new onset cognitive decline this session, most notable with sequencing and new learning. RN aware. Pt is limited by: cognitive impairment (new), balance impairment, pain, generalized weakness, hip precautions, motor incoordination. Will benefit from acute OT to maximize functional independence and safety.     Plan    Continue current treatment plan.    DC Equipment Recommendations: Unable to determine at this time  Discharge Recommendations: Recommend post-acute placement for additional occupational therapy services prior to discharge home    Subjective    \"I'm not allowed to move this way. I'm not allowed to move that way.\" (When asked to relay posterior hip precautions. Pt was unable to specify.)     Objective       01/25/22 1052   Cognition    Level of Consciousness Alert   Safety Awareness Impaired;Impulsive   New Learning Impaired   Attention Impaired   Sequencing Impaired   Comments Pt with cognitive decline this session; significant difficulty sequencing basic grooming task, unable to recall any posterior hip precautions (required repeated cues to not cross legs)    Balance   Sitting Balance (Static) Fair +   Sitting Balance (Dynamic) Fair   Standing Balance (Static) Fair -   Standing Balance " (Dynamic) Poor +   Weight Shift Sitting Fair   Weight Shift Standing Fair   Comments FWW for standing    Bed Mobility    Supine to Sit Contact Guard Assist   Scooting Standby Assist  (seated)   Activities of Daily Living   Grooming Moderate Assist;Seated  (denture care, oral care, hair combing )   Lower Body Dressing Maximal Assist  (doff/don B socks using AE)   Toileting Moderate Assist  (standing urination; difficulty aiming for toilet )   Functional Mobility   Sit to Stand Minimal Assist   Bed, Chair, Wheelchair Transfer Minimal Assist   Transfer Method Stand Step  (FWW)   Mobility Supine > EOB, short gait and transfers with FWW   Short Term Goals   Short Term Goal # 1 pt will complete grooming standing at sink w/spv    Goal Outcome # 1 Progressing slower than expected   Short Term Goal # 2 pt will complete toilet txf w/spv    Goal Outcome # 2 Progressing slower than expected   Short Term Goal # 3 pt will complete LB dressing w/use of AE and spv    Goal Outcome # 3 Progressing slower than expected

## 2022-01-25 NOTE — DISCHARGE PLANNING
Received Choice form at 1020  Agency/Facility Name: [facility]  Referral sent per Choice form @ 1020      CM informed

## 2022-01-25 NOTE — DISCHARGE PLANNING
Anticipated Discharge Disposition: Late entry 8:30 AM-IP Rehab vs SNF.    Action: Sent referrals to Acute Rehab and SNF. Spoke with daughter who agreed to d/c plan and gave choice letter over the phone. Pt will be ready for d/c once we have acceptance.     Barriers to Discharge: Pending auth for placement to Acute Rehab vs SNF.     Plan: F/U with insurance and f/u with daughter once we have authorization.     Late entry: 1/25/22 at 11:30-[facility] denied due to pt not having a discharge plan afterwards. Spoke with daughter and she is working on long term placement for pt.     SNF still pending auth. Referral sent to [facility] and [facility].

## 2022-01-25 NOTE — ASSESSMENT & PLAN NOTE
TSH, vitamin [id number] normal UA unremarkable  My evaluation, patient is alert and oriented x2-4, answering questions appropriately.  Likely secondary to hospital delirium

## 2022-01-25 NOTE — DISCHARGE PLANNING
[facility] Transitional Care Coordination    Follow up for Rehab.  Case reviewed with [facility] admission team.  Limited discharge support is barrier to admission [facility].  Anticipate skilled nursing for post acute transitional care to allow longer interval for rehab prior to returning home.  Voalte message to KATELYNN Flores.      [facility] will no longer follow.      Thank you for the referral.

## 2022-01-25 NOTE — THERAPY
Physical Therapy   Daily Treatment     Patient Name: Navin Suazo  Age:  89 y.o., Sex:  male  Medical Record #: [id number]  Today's Date: 1/25/2022     Precautions  Precautions: (P) Fall Risk;Posterior Hip Precautions;Weight Bearing As Tolerated Right Lower Extremity    Assessment    Pt found seated in chair upon entry into the room, per nrsg more confused. Pt does struggle w/recalling his post precautions, needing cueing during his functional tasks. Pt was a mod assist to come to stand from chair height->FWW, assisting pt w/Rt LE management. Pt unable to amb today 2* low BP w/symptoms of dizziness. Pt's BP standing 63/46 w/. Pt de-SAT on RA, holding in the 90's on 1L.     Plan    Continue current treatment plan.    DC Equipment Recommendations: (P) Unable to determine at this time  Discharge Recommendations: (P) Recommend post-acute placement for additional physical therapy services prior to discharge home     Objective       01/25/22 1123   Other Treatments   Other Treatments Provided Pt c/o dizziness w/his standing efforts. His BP seated following standing 107/69. 2nd BP w/pt standing 63/46 , still symptomatic, 3rd BP seated 110/73  . Pt does de-SAT on RA  w/elevated HR. Ongoing education w/his post precautions during functional mobility.    Balance   Sitting Balance (Static) Fair +   Sitting Balance (Dynamic) Fair   Standing Balance (Static) Fair -   Standing Balance (Dynamic) Poor   Weight Shift Sitting Fair   Weight Shift Standing Poor   Comments standing w/FWW   Gait Analysis   Gait Level Of Assist Unable to Participate   Weight Bearing Status WBAT Rt LE // Post Prec   Comments Pt c/o dizziness, see above BP's.    Bed Mobility    Comments Pt found seated in chair. Did not return BTB, up for lunch. Pt was not c/o dizziness seated.    Functional Mobility   Sit to Stand Moderate Assist  (from chair height->FWW)   Skilled Intervention Verbal Cuing;Postural Facilitation   Comments Pt needing  cueing and facilitation of Rt LE w/his sit<->stands   How much difficulty does the patient currently have...   Turning over in bed (including adjusting bedclothes, sheets and blankets)? 2   Sitting down on and standing up from a chair with arms (e.g., wheelchair, bedside commode, etc.) 1   Moving from lying on back to sitting on the side of the bed? 1   How much help from another person does the patient currently need...   Moving to and from a bed to a chair (including a wheelchair)? 3   Need to walk in a hospital room? 2   Climbing 3-5 steps with a railing? 1   6 clicks Mobility Score 10   Short Term Goals    Short Term Goal # 1 Pt will perform supine<>sit from flat HOB/no railing with supervision within 6 visits to ensure independent mobility at home.    Goal Outcome # 1 goal not met   Short Term Goal # 2 Pt will perform sit<>stand with FWW and supervision within 6 visits to progress to independence.    Goal Outcome # 2 Goal not met   Short Term Goal # 3 Pt will ambulate x 150ft with FWW and supervision within 6 visits to ensure independent mobility at home.    Goal Outcome # 3 Goal not met   Short Term Goal # 4 Pt will ascend/descend 8 stairs with unilateral support and CGA within 6 visits to enter/exit home.    Goal Outcome # 4 Goal not met

## 2022-01-25 NOTE — CARE PLAN
The patient is Stable - Low risk of patient condition declining or worsening    Shift Goals  Clinical Goals: improve mobility  Patient Goals: rest  Family Goals: not present    Progress made toward(s) clinical / shift goals:  yes  Pain well controlled.   Problem: Pain - Standard  Goal: Alleviation of pain or a reduction in pain to the patient’s comfort goal  Outcome: Progressing     Problem: Knowledge Deficit - Standard  Goal: Patient and family/care givers will demonstrate understanding of plan of care, disease process/condition, diagnostic tests and medications  Outcome: Progressing

## 2022-01-25 NOTE — CARE PLAN
The patient is Unstable - High likelihood or risk of patient condition declining or worsening    Shift Goals  Clinical Goals: safety; LOC  Patient Goals: get rid of the bugs  Family Goals: rehab/snf    Progress made toward(s) clinical / shift goals:  ***    Patient is not progressing towards the following goals:

## 2022-01-26 LAB
APPEARANCE UR: CLEAR
BACTERIA #/AREA URNS HPF: NEGATIVE /HPF
BILIRUB UR QL STRIP.AUTO: ABNORMAL
COLOR UR: ABNORMAL
EPI CELLS #/AREA URNS HPF: NEGATIVE /HPF
GLUCOSE UR STRIP.AUTO-MCNC: NEGATIVE MG/DL
HYALINE CASTS #/AREA URNS LPF: ABNORMAL /LPF
KETONES UR STRIP.AUTO-MCNC: 40 MG/DL
LEUKOCYTE ESTERASE UR QL STRIP.AUTO: NEGATIVE
MICRO URNS: ABNORMAL
NITRITE UR QL STRIP.AUTO: NEGATIVE
PH UR STRIP.AUTO: 6 [PH] (ref 5–8)
PROT UR QL STRIP: 100 MG/DL
RBC # URNS HPF: ABNORMAL /HPF
RBC UR QL AUTO: NEGATIVE
SP GR UR STRIP.AUTO: 1.03
UROBILINOGEN UR STRIP.AUTO-MCNC: 2 MG/DL
WBC #/AREA URNS HPF: ABNORMAL /HPF

## 2022-01-26 PROCEDURE — A9270 NON-COVERED ITEM OR SERVICE: HCPCS | Performed by: INTERNAL MEDICINE

## 2022-01-26 PROCEDURE — 99024 POSTOP FOLLOW-UP VISIT: CPT | Performed by: ORTHOPAEDIC SURGERY

## 2022-01-26 PROCEDURE — 700102 HCHG RX REV CODE 250 W/ 637 OVERRIDE(OP): Performed by: INTERNAL MEDICINE

## 2022-01-26 PROCEDURE — A9270 NON-COVERED ITEM OR SERVICE: HCPCS | Performed by: ORTHOPAEDIC SURGERY

## 2022-01-26 PROCEDURE — 700102 HCHG RX REV CODE 250 W/ 637 OVERRIDE(OP): Performed by: ORTHOPAEDIC SURGERY

## 2022-01-26 PROCEDURE — A9270 NON-COVERED ITEM OR SERVICE: HCPCS | Performed by: STUDENT IN AN ORGANIZED HEALTH CARE EDUCATION/TRAINING PROGRAM

## 2022-01-26 PROCEDURE — 99233 SBSQ HOSP IP/OBS HIGH 50: CPT | Performed by: INTERNAL MEDICINE

## 2022-01-26 PROCEDURE — 700102 HCHG RX REV CODE 250 W/ 637 OVERRIDE(OP): Performed by: STUDENT IN AN ORGANIZED HEALTH CARE EDUCATION/TRAINING PROGRAM

## 2022-01-26 PROCEDURE — 700111 HCHG RX REV CODE 636 W/ 250 OVERRIDE (IP): Performed by: ORTHOPAEDIC SURGERY

## 2022-01-26 PROCEDURE — 770001 HCHG ROOM/CARE - MED/SURG/GYN PRIV*

## 2022-01-26 PROCEDURE — 99233 SBSQ HOSP IP/OBS HIGH 50: CPT | Performed by: PHYSICAL MEDICINE & REHABILITATION

## 2022-01-26 RX ORDER — QUETIAPINE FUMARATE 25 MG/1
25 TABLET, FILM COATED ORAL NIGHTLY
Status: DISCONTINUED | OUTPATIENT
Start: 2022-01-26 | End: 2022-01-27

## 2022-01-26 RX ADMIN — TRAZODONE HYDROCHLORIDE 50 MG: 50 TABLET ORAL at 21:39

## 2022-01-26 RX ADMIN — DOCUSATE SODIUM 50 MG AND SENNOSIDES 8.6 MG 1 TABLET: 8.6; 5 TABLET, FILM COATED ORAL at 21:39

## 2022-01-26 RX ADMIN — ONDANSETRON 4 MG: 2 INJECTION INTRAMUSCULAR; INTRAVENOUS at 04:50

## 2022-01-26 RX ADMIN — FERROUS SULFATE TAB 325 MG (65 MG ELEMENTAL FE) 325 MG: 325 (65 FE) TAB at 07:30

## 2022-01-26 RX ADMIN — QUETIAPINE FUMARATE 25 MG: 25 TABLET ORAL at 21:39

## 2022-01-26 RX ADMIN — IBUPROFEN 800 MG: 800 TABLET, FILM COATED ORAL at 08:29

## 2022-01-26 ASSESSMENT — PAIN DESCRIPTION - PAIN TYPE
TYPE: SURGICAL PAIN
TYPE: ACUTE PAIN;SURGICAL PAIN
TYPE: SURGICAL PAIN
TYPE: SURGICAL PAIN

## 2022-01-26 NOTE — CARE PLAN
The patient is Watcher - Medium risk of patient condition declining or worsening    Shift Goals  Clinical Goals: Safety  Patient Goals: Take leg restrints off  Family Goals: rito    Progress made toward(s) clinical / shift goals:    Problem: Skin Integrity  Goal: Skin integrity is maintained or improved  Outcome: Progressing  Note: Patient able to turn self from side to side, frequent reminders to reposition given to patient.     Problem: Fall Risk  Goal: Patient will remain free from falls  Outcome: Progressing  Note: Sitter at bedside, ensured fall precautions in place. Bed in lowest position, treaded socks in place, call light in reach, bed alarm in place, room free of clutter.        Patient is not progressing towards the following goals:

## 2022-01-26 NOTE — PROGRESS NOTES
Physical Medicine and Rehabilitation Consultation              Date of initial consultation: 1/24/2022  Requested by: Terri Matos MD  Consulting physician: Ezekiel Vizcarar D.O.  Reason for consultation: assessment of rehabilitation needs  LOS: 7 Day(s)    SUBJECTIVE:  Patient seen in room with sitter.  GI: 1/24/2022  : continent  Psych: Was on restraints earlier this AM. Patient does not recall. Now with sitter. Refusing some medications.  MSK: Denies pain. Not taking pain medications.    Spoke with daughter about current plans. She is planning to drive her mother back to [place] once patient is settled at SNF. At baseline, patient is grumpy.      PMH:  Past Medical History:   Diagnosis Date   • Erectile dysfunction        PSH:  Past Surgical History:   Procedure Laterality Date   • PB PARTIAL HIP REPLACEMENT Right 1/21/2022    Procedure: HEMIARTHROPLASTY, HIP;  Surgeon: Lloyd Roman M.D.;  Location: SURGERY [facility];  Service: Orthopedics   • HERNIA REPAIR     • VASECTOMY         FHX:  Family History   Problem Relation Age of Onset   • Other Mother    • Other Father        Medications:  Current Facility-Administered Medications   Medication Dose   • ferrous sulfate tablet 325 mg  325 mg   • traZODone (DESYREL) tablet 50 mg  50 mg   • oxyCODONE immediate-release (ROXICODONE) tablet 2.5 mg  2.5 mg    Or   • oxyCODONE immediate-release (ROXICODONE) tablet 5 mg  5 mg   • enoxaparin (LOVENOX) inj 40 mg  40 mg   • Pharmacy Consult Request ...Pain Management Review 1 Each  1 Each   • ondansetron (ZOFRAN) syringe/vial injection 4 mg  4 mg   • dexamethasone (DECADRON) injection 4 mg  4 mg   • diphenhydrAMINE (BENADRYL) injection 25 mg  25 mg   • haloperidol lactate (HALDOL) injection 1 mg  1 mg   • scopolamine (TRANSDERM-SCOP) patch 1 Patch  1 Patch   • docusate sodium (COLACE) capsule 100 mg  100 mg   • senna-docusate (PERICOLACE or SENOKOT S) 8.6-50 MG per tablet 1 Tablet  1 Tablet   •  "senna-docusate (PERICOLACE or SENOKOT S) 8.6-50 MG per tablet 1 Tablet  1 Tablet   • polyethylene glycol/lytes (MIRALAX) PACKET 1 Packet  1 Packet   • magnesium hydroxide (MILK OF MAGNESIA) suspension 30 mL  30 mL   • bisacodyl (DULCOLAX) suppository 10 mg  10 mg   • sodium phosphate (Fleet) enema 133 mL  1 Each   • ibuprofen (MOTRIN) tablet 800 mg  800 mg   • acetaminophen (Tylenol) tablet 650 mg  650 mg    Followed by   • acetaminophen (Tylenol) tablet 650 mg  650 mg   • labetalol (NORMODYNE/TRANDATE) injection 10 mg  10 mg   • atorvastatin (LIPITOR) tablet 40 mg  40 mg   • melatonin tablet 5 mg  5 mg   • folic acid (FOLVITE) tablet 1 mg  1 mg   • cyanocobalamin (VITAMIN B-12) tablet 1,000 mcg  1,000 mcg       Allergies:  No Known Allergies    Physical Exam:  Vitals: /82   Pulse 83   Temp 36.7 °C (98 °F) (Temporal)   Resp 18   Ht 1.803 m (5' 11\")   Wt 90.7 kg (200 lb)   SpO2 95%   General: well-groomed in no acute distress, no visitors present  Eyes: no scleral icterus or conjunctival injection  Ears, nose, mouth and throat: moist oral mucosa  Cardiovascular: good peripheral perfusion, no palor  Respiratory: breathing comfortably without use of accessory muscles, no NC  Gastrointestinal: soft, nontender, nondistended  Genitourinary: no indwelling leslie  Musculoskeletal: good symmetry in bilateral shoulders,  Skin: no wounds seen on exposed skin    Neurologic:  Mental status:  answers questions appropriately follows commands  Speech: fluent, no aphasia or dysarthria  Tone: no spasticity noted  Psychiatric: flat affect  Hematologic/lymphatic/immunologic: ++IV access, no bruises seen on exposed skin    Labs: Reviewed and significant for   Recent Labs     01/24/22  0539 01/25/22  0837   RBC 3.65* 3.54*   HEMOGLOBIN 12.6* 12.2*   HEMATOCRIT 37.4* 35.9*   PLATELETCT 186 212   IRON 28*  --    TOTIRONBC 166*  --      Recent Labs     01/24/22  0539 01/25/22  0837   SODIUM 140 138   POTASSIUM 3.8 3.6   CHLORIDE " 103 102   CO2 25 20   GLUCOSE 102* 120*   BUN 26* 23*   CREATININE 1.01 0.91   CALCIUM 8.3* 8.4*     Recent Results (from the past 24 hour(s))   AMMONIA    Collection Time: 01/25/22  3:05 PM   Result Value Ref Range    Ammonia <10 (L) 11 - 45 umol/L   URINALYSIS    Collection Time: 01/25/22  7:50 PM    Specimen: Urine, Clean Catch   Result Value Ref Range    Color DK Yellow     Character Clear     Specific Gravity 1.032 <1.035    Ph 6.0 5.0 - 8.0    Glucose Negative Negative mg/dL    Ketones 40 (A) Negative mg/dL    Protein 100 (A) Negative mg/dL    Bilirubin Moderate (A) Negative    Urobilinogen, Urine 2.0 Negative    Nitrite Negative Negative    Leukocyte Esterase Negative Negative    Occult Blood Negative Negative    Micro Urine Req Microscopic    URINE MICROSCOPIC (W/UA)    Collection Time: 01/25/22  7:50 PM   Result Value Ref Range    WBC 0-2 (A) /hpf    RBC 2-5 (A) /hpf    Bacteria Negative None /hpf    Epithelial Cells Negative /hpf    Hyaline Cast 0-2 /lpf         ASSESSMENT:  IMPRESSION: The patient is a 89 y.o. male with a past medical history of coronary artery disease s/p stent, dyslipidemia, hard of hearing;  who presented on 1/19/2022 10:33 PM after fall and found to have Mildly comminuted, angulated and displaced transcervical right femoral neck fracture.     [facility] Code: 0008.11 - Orthopaedic Disorders: Status Post Unilateral Hip Fracture  -With acute secondary complications of: impaired ADLs and mobility, posttraumatic and postsurgical pain  -with chronic conditions of: hard of hearing  -and:     Medical Complexity:  Macrocytosis    RECOMMENDATIONS:    ##MSK  #Impaired ADLs and mobility: Agree with continuing OT/PT while admitted here.    Patient would benefit from aggressive OT and PT in acute inpatient rehabilitation. Currently, barrier to acute inpatient rehabilitation includes:  patient does not have a safe final discharge disposition    #Mildly comminuted, angulated and displaced transcervical  right femoral neck fracture s/p Open treatment of right femoral fracture, proximal end, neck with prosthetic replacement on 1/21/2022 by Dr. Lloyd Roman MD.   Weight bearing as tolerated with posterior hip precautions.    #Posttraumatic pain, acute, controlled  #Postsurgical pain, acute, controlled  Continue oxycodone 2.5-5mg Q3hr PRN pain, acetaminophen 650mg Q6hr    ##PSYCH  #Hallucinations, worsening  #Impaired sleep  Likely multifactorial  Discontinued melatonin as this could be exacerbating hallucinations  Consider switching trazodone to seroquel and giving at dinner time as patient seems to sundown    ##  Continent    ##SKIN  #Incision site  Routine wound care    DVT chemoprophlaxis: lovenox 40mg daily  Code: full resuscitation    Thank you for allowing us to participate in the care of this patient. Physiatry will continue to follow and provide recommendations, as needed.    Patient was seen for 36 minutes on unit/floor of which > 50% of time was spent on counseling and coordination of care regarding the above, including prognosis, risk reduction, benefits of treatment, and options for next stage of care.    Ezekiel Vizcarra D.O.   Physical Medicine and Rehabilitation     I have performed a physical exam and reviewed and updated history and plan today (1/26/2022).     Please note that this dictation was created using voice recognition software. I have made every reasonable attempt to correct obvious errors, but there may be errors of grammar and possibly content that I did not discover before finalizing the note.

## 2022-01-26 NOTE — DISCHARGE PLANNING
Received Choice form at 1029  Agency/Facility Name: [facility] [place], [place]  Referral sent per Choice form @ 1029      CM informed

## 2022-01-26 NOTE — THERAPY
Missed Therapy     Patient Name: Navin Suazo  Age:  89 y.o., Sex:  male  Medical Record #: [id number]  Today's Date: 1/26/2022 01/26/22 0915   Treatment Variance   Reason For Missed Therapy Medical - Patient Agitated   Interdisciplinary Plan of Care Collaboration   Collaboration Comments OT tx held pt w/increased agiation and restless not following commands and in restraints will hold today and follow up as appropriate and able to participate    Session Information   Date / Session Number  1/26 HOLD   (1/25 #2 (2/5, 1/28))   Priority 3

## 2022-01-26 NOTE — PROGRESS NOTES
Pt hallucinations increasing.   Reassured patient and reoriented however every few minutes pt would still get up.  Pt chocked on morning medication, sat forward, vomited. Unable to take AM medications.    Bed alarm continued to alarm as pt still getting up.    Pt a harm to self, and serious injury to self.    Called Dr to get restraint order because other interventions not working.

## 2022-01-26 NOTE — CARE PLAN
The patient is Watcher - Medium risk of patient condition declining or worsening    Shift Goals  Clinical Goals: safety; LOC  Patient Goals: get rid of the bugs  Family Goals: rehab/snf    Progress made toward(s) clinical / shift goals:  Pt declines pain.  Today pt has been experiencing delirium and hallucinations. UA results pending.    Problem: Pain - Standard  Goal: Alleviation of pain or a reduction in pain to the patient’s comfort goal  Outcome: Progressing     Problem: Knowledge Deficit - Standard  Goal: Patient and family/care givers will demonstrate understanding of plan of care, disease process/condition, diagnostic tests and medications  1/25/2022 2011 by Terese Lehman R.N.  Outcome: Progressing  1/25/2022 1500 by Terese Lehman R.N.  Outcome: Progressing     Problem: Skin Integrity  Goal: Skin integrity is maintained or improved  1/25/2022 2011 by Terese Lehman, R.N.  Outcome: Progressing  1/25/2022 1500 by Terese Lehman R.N.  Outcome: Progressing       Patient is not progressing towards the following goals:

## 2022-01-26 NOTE — PROGRESS NOTES
"A little confused yesterday but mobilized with PT    /88   Pulse (!) 102   Temp 36.9 °C (98.5 °F) (Temporal)   Resp 18   Ht 1.803 m (5' 11\")   Wt 90.7 kg (200 lb)   SpO2 92%     Recent Labs     01/24/22  0539 01/25/22  0837   WBC 10.3 9.2   RBC 3.65* 3.54*   HEMOGLOBIN 12.6* 12.2*   HEMATOCRIT 37.4* 35.9*   .5* 101.4*   MCH 34.5* 34.5*   MCHC 33.7 34.0   RDW 45.8 44.8   PLATELETCT 186 212   MPV 10.1 10.7         POD#5  S/P Hip kris     Plan:  DVT Prophylaxis- TEDS/SCDs, lovenox while in hospital, ASA 81 mg PO BID as outpatient  Weight Bearing Status-WBAT, posterior hip precautions   PT/OT  Antibiotics: None  Case Coordination          "

## 2022-01-26 NOTE — CARE PLAN
The patient is Stable - Low risk of patient condition declining or worsening    Shift Goals  Clinical Goals: safety  Patient Goals: get rid of bigs  Family Goals: rito    Progress made toward(s) clinical / shift goals:  yes    Problem: Pain - Standard  Goal: Alleviation of pain or a reduction in pain to the patient’s comfort goal  Outcome: Progressing   Pain well controlled with scheduled and PRN medication.    Patient is not progressing towards the following goals:    Problem: Fall Risk  Goal: Patient will remain free from falls  Outcome: Not Progressing   Pt continues to hallucinate and try to get out of bed. Bed alarm in place, reassurance provided and pt settles quickly with reassurance.

## 2022-01-26 NOTE — PROGRESS NOTES
Patient yelling out and being verbally aggressive about ankle restraints. Restraints are ordered as bilateral ankle and right wrist although patient was able to take off wrist restraint. Patient focusing on yelling about needing to take off left ankle restraint. Patient repositioned and calmed down after ankle restraint removed. Patient also removed IV due to irritation about restraints.  Bed alarm and frame alarm on, bed locked and in lowest position, call light within reach, patient sitting up in bed comfortable at this time. This RN sitting outside of room when able to, frequent rounding in place.    0853 Patient requiring L ankle restraint to be replaced. Continuously attempting to get out of bed without assistance and does not follow directions. Notified MD to get a 1:1 sitter at bedside.    1114 Sitter at bedside, report given, restraints discontinued.

## 2022-01-27 LAB
ANION GAP SERPL CALC-SCNC: 12 MMOL/L (ref 7–16)
BASOPHILS # BLD AUTO: 0.5 % (ref 0–1.8)
BASOPHILS # BLD: 0.06 K/UL (ref 0–0.12)
BUN SERPL-MCNC: 31 MG/DL (ref 8–22)
CALCIUM SERPL-MCNC: 8.2 MG/DL (ref 8.5–10.5)
CHLORIDE SERPL-SCNC: 105 MMOL/L (ref 96–112)
CO2 SERPL-SCNC: 22 MMOL/L (ref 20–33)
CREAT SERPL-MCNC: 0.84 MG/DL (ref 0.5–1.4)
EOSINOPHIL # BLD AUTO: 0.08 K/UL (ref 0–0.51)
EOSINOPHIL NFR BLD: 0.7 % (ref 0–6.9)
ERYTHROCYTE [DISTWIDTH] IN BLOOD BY AUTOMATED COUNT: 43.8 FL (ref 35.9–50)
GLUCOSE SERPL-MCNC: 101 MG/DL (ref 65–99)
HCT VFR BLD AUTO: 34.4 % (ref 42–52)
HGB BLD-MCNC: 11.9 G/DL (ref 14–18)
IMM GRANULOCYTES # BLD AUTO: 0.09 K/UL (ref 0–0.11)
IMM GRANULOCYTES NFR BLD AUTO: 0.8 % (ref 0–0.9)
LYMPHOCYTES # BLD AUTO: 1.18 K/UL (ref 1–4.8)
LYMPHOCYTES NFR BLD: 10.4 % (ref 22–41)
MCH RBC QN AUTO: 34.7 PG (ref 27–33)
MCHC RBC AUTO-ENTMCNC: 34.6 G/DL (ref 33.7–35.3)
MCV RBC AUTO: 100.3 FL (ref 81.4–97.8)
MONOCYTES # BLD AUTO: 1 K/UL (ref 0–0.85)
MONOCYTES NFR BLD AUTO: 8.8 % (ref 0–13.4)
NEUTROPHILS # BLD AUTO: 8.95 K/UL (ref 1.82–7.42)
NEUTROPHILS NFR BLD: 78.8 % (ref 44–72)
NRBC # BLD AUTO: 0 K/UL
NRBC BLD-RTO: 0 /100 WBC
PLATELET # BLD AUTO: 278 K/UL (ref 164–446)
PMV BLD AUTO: 9.7 FL (ref 9–12.9)
POTASSIUM SERPL-SCNC: 3.8 MMOL/L (ref 3.6–5.5)
RBC # BLD AUTO: 3.43 M/UL (ref 4.7–6.1)
SODIUM SERPL-SCNC: 139 MMOL/L (ref 135–145)
WBC # BLD AUTO: 11.4 K/UL (ref 4.8–10.8)

## 2022-01-27 PROCEDURE — 99232 SBSQ HOSP IP/OBS MODERATE 35: CPT | Performed by: INTERNAL MEDICINE

## 2022-01-27 PROCEDURE — 700111 HCHG RX REV CODE 636 W/ 250 OVERRIDE (IP): Performed by: ORTHOPAEDIC SURGERY

## 2022-01-27 PROCEDURE — 80048 BASIC METABOLIC PNL TOTAL CA: CPT

## 2022-01-27 PROCEDURE — A9270 NON-COVERED ITEM OR SERVICE: HCPCS | Performed by: STUDENT IN AN ORGANIZED HEALTH CARE EDUCATION/TRAINING PROGRAM

## 2022-01-27 PROCEDURE — 700101 HCHG RX REV CODE 250: Performed by: PHYSICAL MEDICINE & REHABILITATION

## 2022-01-27 PROCEDURE — 97535 SELF CARE MNGMENT TRAINING: CPT

## 2022-01-27 PROCEDURE — 700102 HCHG RX REV CODE 250 W/ 637 OVERRIDE(OP): Performed by: ORTHOPAEDIC SURGERY

## 2022-01-27 PROCEDURE — 99232 SBSQ HOSP IP/OBS MODERATE 35: CPT | Performed by: PHYSICAL MEDICINE & REHABILITATION

## 2022-01-27 PROCEDURE — A9270 NON-COVERED ITEM OR SERVICE: HCPCS | Performed by: ORTHOPAEDIC SURGERY

## 2022-01-27 PROCEDURE — A9270 NON-COVERED ITEM OR SERVICE: HCPCS | Performed by: INTERNAL MEDICINE

## 2022-01-27 PROCEDURE — 700102 HCHG RX REV CODE 250 W/ 637 OVERRIDE(OP): Performed by: STUDENT IN AN ORGANIZED HEALTH CARE EDUCATION/TRAINING PROGRAM

## 2022-01-27 PROCEDURE — 36415 COLL VENOUS BLD VENIPUNCTURE: CPT

## 2022-01-27 PROCEDURE — 99024 POSTOP FOLLOW-UP VISIT: CPT | Performed by: ORTHOPAEDIC SURGERY

## 2022-01-27 PROCEDURE — 700102 HCHG RX REV CODE 250 W/ 637 OVERRIDE(OP): Performed by: INTERNAL MEDICINE

## 2022-01-27 PROCEDURE — 770001 HCHG ROOM/CARE - MED/SURG/GYN PRIV*

## 2022-01-27 PROCEDURE — 85025 COMPLETE CBC W/AUTO DIFF WBC: CPT

## 2022-01-27 RX ORDER — LIDOCAINE 50 MG/G
1 PATCH TOPICAL EVERY 24 HOURS
Status: DISCONTINUED | OUTPATIENT
Start: 2022-01-27 | End: 2022-02-02 | Stop reason: HOSPADM

## 2022-01-27 RX ORDER — QUETIAPINE FUMARATE 25 MG/1
50 TABLET, FILM COATED ORAL NIGHTLY
Status: DISCONTINUED | OUTPATIENT
Start: 2022-01-27 | End: 2022-01-31

## 2022-01-27 RX ADMIN — QUETIAPINE FUMARATE 50 MG: 25 TABLET ORAL at 20:22

## 2022-01-27 RX ADMIN — DOCUSATE SODIUM 50 MG AND SENNOSIDES 8.6 MG 1 TABLET: 8.6; 5 TABLET, FILM COATED ORAL at 20:22

## 2022-01-27 RX ADMIN — LIDOCAINE 1 PATCH: 50 PATCH TOPICAL at 11:50

## 2022-01-27 RX ADMIN — ATORVASTATIN CALCIUM 40 MG: 40 TABLET, FILM COATED ORAL at 04:30

## 2022-01-27 RX ADMIN — FOLIC ACID 1 MG: 1 TABLET ORAL at 04:30

## 2022-01-27 RX ADMIN — CYANOCOBALAMIN TAB 500 MCG 1000 MCG: 500 TAB at 04:45

## 2022-01-27 RX ADMIN — POLYETHYLENE GLYCOL 3350 1 PACKET: 17 POWDER, FOR SOLUTION ORAL at 04:31

## 2022-01-27 RX ADMIN — FERROUS SULFATE TAB 325 MG (65 MG ELEMENTAL FE) 325 MG: 325 (65 FE) TAB at 08:26

## 2022-01-27 RX ADMIN — ACETAMINOPHEN 650 MG: 325 TABLET, FILM COATED ORAL at 04:31

## 2022-01-27 RX ADMIN — DOCUSATE SODIUM 100 MG: 100 CAPSULE, LIQUID FILLED ORAL at 04:30

## 2022-01-27 RX ADMIN — ACETAMINOPHEN 650 MG: 325 TABLET, FILM COATED ORAL at 20:22

## 2022-01-27 RX ADMIN — ENOXAPARIN SODIUM 40 MG: 40 INJECTION SUBCUTANEOUS at 04:30

## 2022-01-27 ASSESSMENT — COGNITIVE AND FUNCTIONAL STATUS - GENERAL
PERSONAL GROOMING: A LOT
SUGGESTED CMS G CODE MODIFIER DAILY ACTIVITY: CK
DAILY ACTIVITIY SCORE: 14
TOILETING: A LOT
DRESSING REGULAR UPPER BODY CLOTHING: A LITTLE
DRESSING REGULAR LOWER BODY CLOTHING: A LOT
EATING MEALS: A LITTLE
HELP NEEDED FOR BATHING: A LOT

## 2022-01-27 ASSESSMENT — PAIN DESCRIPTION - PAIN TYPE
TYPE: ACUTE PAIN
TYPE: ACUTE PAIN;SURGICAL PAIN

## 2022-01-27 NOTE — CARE PLAN
Problem: Nutritional:  Goal: Achieve adequate nutritional intake  Description: Patient will consume >50% of meals  Outcome: Progressing   See RD note.

## 2022-01-27 NOTE — DIETARY
"Nutrition services: Day 8 of admit.  Navin Suazo is a 89 y.o. male with admitting DX of Closed right hip fracture    Consult received for poor PO       Assessment:  Height: 180.3 cm (5' 11\")  Weight: 90.7 kg (200 lb)(other healthcare)  Body mass index is 27.89 kg/m²., BMI classification: acceptable based on age  Diet/Intake: regular/% of most meals. 25-50% of dinner last night    Evaluation:   1. Consult for poor PO ordered due to diminished PO intake and report of poor appetite last night at dinner. Breakfast intake has not been documented yet. Overall PO intake has been adequate.     Malnutrition Risk: criteria not met    Recommendations/Plan:   1. Encourage intake of >50%  2. Document intake of all meals as % taken in ADL's to provide interdisciplinary communication across all shifts.   3. Monitor weight.  4. Nutrition rep will continue to see patient for ongoing meal and snack preferences.     RD will monitor PO intake to ensure that it continues to be adequate.   "

## 2022-01-27 NOTE — PROGRESS NOTES
"   Orthopaedic Progress Note    Interval changes:  Patient doing well  Dressing CDI  RLE dressings CDI    ROS - Patient denies any new issues but is confused.  Pain well controlled.    /77   Pulse 80   Temp 36.7 °C (98 °F) (Temporal)   Resp 18   Ht 1.803 m (5' 11\")   Wt 90.7 kg (200 lb)   SpO2 96%       Patient seen and examined  No acute distress  Breathing non labored  RRR  RLE dressings CDI, DNVI, moves all toes, cap refill <2 sec.     Recent Labs     01/24/22  0539 01/25/22  0837   WBC 10.3 9.2   RBC 3.65* 3.54*   HEMOGLOBIN 12.6* 12.2*   HEMATOCRIT 37.4* 35.9*   .5* 101.4*   MCH 34.5* 34.5*   MCHC 33.7 34.0   RDW 45.8 44.8   PLATELETCT 186 212   MPV 10.1 10.7       Active Hospital Problems    Diagnosis    • Encephalopathy [G93.40]    • Acute blood loss anemia [D62]    • CHAPITO (acute kidney injury) ([facility]) [N17.9]    • Stage 3a chronic kidney disease ([facility]) [N18.31]    • Elevated MCV [R71.8]    • CAD (coronary artery disease), native coronary artery [I25.10]    • Closed right hip fracture, initial encounter ([facility]) [S72.001A]    • Mixed hyperlipidemia [E78.2]    • Insomnia [G47.00]        Assessment/Plan:  Patient doing well  Dressing CDI  RLE dressings CDI  POD#5 S/P Open treatment of right femoral fracture, proximal end, neck with prosthetic replacement  Wt bearing status - WBAT  Wound care/Drains - Dressings to be changed every other day by nursing  Future Procedures - none planned   Lovenox: Start 1/22, Duration-until ambulatory > 150'  Sutures/Staples out- 14 days post operatively  PT/OT-initiated  Antibiotics:  Perioperative completed  DVT Prophylaxis- TEDS/SCDs/Foot pumps  Raya-none  Case Coordination for Discharge Planning - Disposition SNF   "

## 2022-01-27 NOTE — PROGRESS NOTES
Physical Medicine and Rehabilitation Consultation              Date of initial consultation: 1/24/2022  Requested by: Terri Matos MD  Consulting physician: Ezekiel Vizcarra D.O.  Reason for consultation: assessment of rehabilitation needs  LOS: 8 Day(s)    SUBJECTIVE:  Patient seen in room with sitter.  GI: recorded as 1/24/2022, patient reporting 6 hours ago  : continent  Psych: Was awake off and on last night. Still with sitter. Has not been refusing medications  MSK: Denies pain but in pain with transfer. Not taking pain medications.    PMH:  Past Medical History:   Diagnosis Date   • Erectile dysfunction        PSH:  Past Surgical History:   Procedure Laterality Date   • PB PARTIAL HIP REPLACEMENT Right 1/21/2022    Procedure: HEMIARTHROPLASTY, HIP;  Surgeon: Lloyd Roman M.D.;  Location: SURGERY [facility];  Service: Orthopedics   • HERNIA REPAIR     • VASECTOMY         FHX:  Family History   Problem Relation Age of Onset   • Other Mother    • Other Father        Medications:  Current Facility-Administered Medications   Medication Dose   • QUEtiapine (Seroquel) tablet 25 mg  25 mg   • ferrous sulfate tablet 325 mg  325 mg   • traZODone (DESYREL) tablet 50 mg  50 mg   • oxyCODONE immediate-release (ROXICODONE) tablet 2.5 mg  2.5 mg    Or   • oxyCODONE immediate-release (ROXICODONE) tablet 5 mg  5 mg   • enoxaparin (LOVENOX) inj 40 mg  40 mg   • Pharmacy Consult Request ...Pain Management Review 1 Each  1 Each   • ondansetron (ZOFRAN) syringe/vial injection 4 mg  4 mg   • dexamethasone (DECADRON) injection 4 mg  4 mg   • diphenhydrAMINE (BENADRYL) injection 25 mg  25 mg   • haloperidol lactate (HALDOL) injection 1 mg  1 mg   • scopolamine (TRANSDERM-SCOP) patch 1 Patch  1 Patch   • docusate sodium (COLACE) capsule 100 mg  100 mg   • senna-docusate (PERICOLACE or SENOKOT S) 8.6-50 MG per tablet 1 Tablet  1 Tablet   • senna-docusate (PERICOLACE or SENOKOT S) 8.6-50 MG per tablet 1 Tablet  1  "Tablet   • polyethylene glycol/lytes (MIRALAX) PACKET 1 Packet  1 Packet   • magnesium hydroxide (MILK OF MAGNESIA) suspension 30 mL  30 mL   • bisacodyl (DULCOLAX) suppository 10 mg  10 mg   • sodium phosphate (Fleet) enema 133 mL  1 Each   • ibuprofen (MOTRIN) tablet 800 mg  800 mg   • acetaminophen (Tylenol) tablet 650 mg  650 mg   • labetalol (NORMODYNE/TRANDATE) injection 10 mg  10 mg   • atorvastatin (LIPITOR) tablet 40 mg  40 mg   • folic acid (FOLVITE) tablet 1 mg  1 mg   • cyanocobalamin (VITAMIN B-12) tablet 1,000 mcg  1,000 mcg       Allergies:  No Known Allergies    Physical Exam:  Vitals: /83   Pulse 100   Temp 37.2 °C (99 °F) (Temporal)   Resp 16   Ht 1.803 m (5' 11\")   Wt 90.7 kg (200 lb)   SpO2 92%   General: well-groomed in no acute distress, sitter present  Eyes: no scleral icterus or conjunctival injection  Ears, nose, mouth and throat: moist oral mucosa  Cardiovascular: good peripheral perfusion  Respiratory: breathing comfortably without use of accessory muscles, no NC  Gastrointestinal: soft, nontender, nondistended  Genitourinary: no indwelling leslie  Musculoskeletal: good symmetry in bilateral shoulders,  Skin: no wounds seen on exposed skin    Neurologic:  Mental status:  answers questions appropriately follows commands  Speech: fluent, no aphasia or dysarthria  Tone: no spasticity noted  Psychiatric: flat affect  Hematologic/lymphatic/immunologic: ++IV access, no bruises seen on exposed skin    Labs: Reviewed and significant for   Recent Labs     01/25/22  0837   RBC 3.54*   HEMOGLOBIN 12.2*   HEMATOCRIT 35.9*   PLATELETCT 212     Recent Labs     01/25/22  0837   SODIUM 138   POTASSIUM 3.6   CHLORIDE 102   CO2 20   GLUCOSE 120*   BUN 23*   CREATININE 0.91   CALCIUM 8.4*     No results found for this or any previous visit (from the past 24 hour(s)).      ASSESSMENT:  IMPRESSION: The patient is a 89 y.o. male with a past medical history of coronary artery disease s/p stent, " dyslipidemia, hard of hearing;  who presented on 1/19/2022 10:33 PM after fall and found to have Mildly comminuted, angulated and displaced transcervical right femoral neck fracture.     Psychiatric Code: 0008.11 - Orthopaedic Disorders: Status Post Unilateral Hip Fracture  -With acute secondary complications of: impaired ADLs and mobility, posttraumatic and postsurgical pain  -with chronic conditions of: hard of hearing  -and:     Medical Complexity:  Macrocytosis    RECOMMENDATIONS:    ##MSK  #Impaired ADLs and mobility: Agree with continuing OT/PT while admitted here.    Patient would benefit from aggressive OT and PT in acute inpatient rehabilitation. Currently, barrier to acute inpatient rehabilitation includes:  patient does not have a safe final discharge disposition    #Mildly comminuted, angulated and displaced transcervical right femoral neck fracture s/p Open treatment of right femoral fracture, proximal end, neck with prosthetic replacement on 1/21/2022 by Dr. Lloyd Roman MD.   Weight bearing as tolerated with posterior hip precautions.    #Posttraumatic pain, acute, controlled  #Postsurgical pain, acute, controlled  Continue oxycodone 2.5-5mg Q3hr PRN pain, acetaminophen 650mg Q6hr PRN pain  Ordered lidocaine 5% patch to right hip - avoid incision area    ##PSYCH  #Hallucinations, improving  #Impaired sleep  Likely multifactorial  Discontinued melatonin as this could be exacerbating hallucinations  Consider discontinuing trazodone and increasing seroquel and giving at dinner time as patient seems to sundown    ##  Continent    ##SKIN  #Incision site  Routine wound care    DVT chemoprophlaxis: lovenox 40mg daily  Code: full resuscitation    Thank you for allowing us to participate in the care of this patient. Physiatry will continue to follow and provide recommendations, as needed.    Patient was seen for 27 minutes on unit/floor of which > 50% of time was spent on counseling and coordination of care  regarding the above, including prognosis, risk reduction, benefits of treatment, and options for next stage of care.    Ezekiel Vizcarra D.O.   Physical Medicine and Rehabilitation     I have performed a physical exam and reviewed and updated history and plan today (1/26/2022).     Please note that this dictation was created using voice recognition software. I have made every reasonable attempt to correct obvious errors, but there may be errors of grammar and possibly content that I did not discover before finalizing the note.

## 2022-01-27 NOTE — PROGRESS NOTES
[facility] Medicine Daily Progress Note    Date of Service  1/26/2022    Chief Complaint  Navin Suazo is a 89 y.o. male admitted 1/19/2022 with right hip pain    Hospital Course  Navin Suazo is a 89 y.o. male who presented 1/19/2022 with fall.  PMH of dyslipidemia, CKD 3.  Patient tripped down some stairs, fell down on the last step and hit the concrete on the right side earlier this evening.  Did not has hit his head, denies LOC, most of the pain is on the right way he also has some left hip pain as well.  CT head, C-spine with no acute abnormality.  CT pelvis shows angulated and displaced transcervical right femoral neck fracture.  EDP spoke with Ortho who will evaluate the patient for surgery tomorrow.    Interval Problem Update  POD 3 s/p right hip fx repair.   BP normalized. CHAPITO resolved after IVF bolus. H/H stable.  Complaining of insomnia. Melatonin does not work for him. Trial trazodone.  Seeing distressing spider webs in the corners of the room and knows that they are not there. Provided reassurance.  Voiding well, last BM today. Delirium prevention measures.    Motivated, may be appropriate candidate for inpatient rehab. Referral pending.  His wife has parkinsons, is cared for by their daughter, who can not care for both.    1/25-he is slight agitated. Remain cooperative. He believes that his daughter is stuck in a room. He talks about PD. He hopes to talk to director from [facility]. He called his son later telling to his son that he has bugs on his room. Spoke to his daughter. This is not his baseline. He drinks alcohol 5 shots whisk a week, and a glass of wine every dinner.     1/26- he had a rough night. He was placed on restrain. Sleeping during the day. UA no signs of infection. Ammonia level normal. Cannot rule out alcohol withdrawal agitation. No neuro deficits. Continue to monitor. If again agitated tomorrow, I will order brain MRI.     I have personally seen and examined the patient at  bedside. I discussed the plan of care with patient, family, bedside RN, charge RN,  and pharmacy.    Consultants/Specialty  orthopedics    Code Status  Full Code    Disposition  Patient is not medically cleared for discharge.   Anticipate discharge to to skilled nursing facility vs inpatient rehab.  I have placed the appropriate orders for post-discharge needs.    Review of Systems  Review of Systems   Unable to perform ROS: Mental status change        Physical Exam  Temp:  [36.7 °C (98 °F)-37.2 °C (98.9 °F)] 36.7 °C (98 °F)  Pulse:  [] 80  Resp:  [18] 18  BP: (118-145)/(77-88) 118/77  SpO2:  [91 %-96 %] 96 %    Physical Exam  Vitals and nursing note reviewed.   Constitutional:       General: He is not in acute distress.     Appearance: Normal appearance.   HENT:      Head: Normocephalic and atraumatic.      Right Ear: External ear normal.      Left Ear: External ear normal.      Nose: Nose normal. No congestion.      Mouth/Throat:      Mouth: Mucous membranes are moist.      Pharynx: Oropharynx is clear.   Eyes:      General: No scleral icterus.     Conjunctiva/sclera: Conjunctivae normal.   Cardiovascular:      Rate and Rhythm: Normal rate and regular rhythm.      Heart sounds: Normal heart sounds.   Pulmonary:      Effort: No respiratory distress.      Breath sounds: Normal breath sounds. No wheezing or rales.   Abdominal:      General: Abdomen is flat. Bowel sounds are normal. There is no distension.      Tenderness: There is no abdominal tenderness.   Musculoskeletal:      Cervical back: Neck supple. No rigidity.      Right lower leg: No edema.      Left lower leg: No edema.      Comments: Right hip surgical dressing in place, c/d/i without strikethrough. Surrounding skin nonerythematous, nonedematous, without ecchymosis.   Skin:     General: Skin is warm and dry.   Neurological:      Mental Status: He is alert and oriented to person, place, and time.      Cranial Nerves: No cranial nerve  deficit.   Psychiatric:         Mood and Affect: Mood normal.         Thought Content: Thought content normal.         Judgment: Judgment normal.         Fluids    Intake/Output Summary (Last 24 hours) at 1/26/2022 1630  Last data filed at 1/26/2022 1300  Gross per 24 hour   Intake 240 ml   Output --   Net 240 ml       Laboratory  Recent Labs     01/24/22  0539 01/25/22  0837   WBC 10.3 9.2   RBC 3.65* 3.54*   HEMOGLOBIN 12.6* 12.2*   HEMATOCRIT 37.4* 35.9*   .5* 101.4*   MCH 34.5* 34.5*   MCHC 33.7 34.0   RDW 45.8 44.8   PLATELETCT 186 212   MPV 10.1 10.7     Recent Labs     01/24/22  0539 01/25/22  0837   SODIUM 140 138   POTASSIUM 3.8 3.6   CHLORIDE 103 102   CO2 25 20   GLUCOSE 102* 120*   BUN 26* 23*   CREATININE 1.01 0.91   CALCIUM 8.3* 8.4*                   Imaging  DX-CHEST-LIMITED (1 VIEW)   Final Result      1.  Cardiomegaly.   2.  Slight interstitial prominence. No consolidation or pleural effusion.      DX-FEMUR-2+ RIGHT   Final Result      Mildly angulated and displaced transcervical right femoral neck fracture.      DX-PELVIS-1 OR 2 VIEWS   Final Result      Mildly displaced transcervical right femoral neck fracture.      CT-PELVIS W/O PLUS RECONS   Final Result      Mildly comminuted, angulated and displaced transcervical right femoral neck fracture.      CT-HEAD W/O   Final Result      1.  Chronic ischemic changes.   2.  No acute intracranial abnormality.         CT-CSPINE WITHOUT PLUS RECONS   Final Result      1.  No acute fracture or dislocation of the cervical spine.   2.  Mild degeneration.   3.  Severe carotid calcified plaque.           Assessment/Plan  * Closed right hip fracture, initial encounter (HCC)- (present on admission)  Assessment & Plan  Mechanical fall down some stairs prior to admission.  CT pelvis shows right femoral neck fracture  S/p right hip fx repair on 1/21. Appreciate ortho recommendations.  Pain well controlled, continue current regimen. Wean IV medications as  tolerated.  Motivated, may be appropriate candidate for inpatient rehab. Referral pending.  1/25- he is taking NSAIDs only. No narcotic. Continue to monitor    Encephalopathy- (present on admission)  Assessment & Plan  1/25- this is not his baseline. Talked to his daughter. + visual hallucinations   UA to rule out UTI. Not symptomatic   Cannot rule out alcoholic withdrawal ??  No neuro deficits   Also hospital delirium is on differential.   Otherwise pt has no history of dementia per his daughter and he function well with the help of his daughter at home prior to this   Will consider brain MRI if not improving in couple of days   1/26- about the same, slight better per rehab physician. Continue to monitor. No signs of UTI, ammonia level normal. Continue to monitor.     CHAPITO (acute kidney injury) (HCC)  Assessment & Plan  Developed postoperatively in conjunction with mild hypotension and acute blood loss anemia. Suspect hypovolemia.  Resolved after LR bolus.   Monitor.    Acute blood loss anemia  Assessment & Plan  Hgb dropped from 14.6 to 12.7 postoperatively.  Iron studies reveal low iron, TIBC. Supplement iron.  Monitor.    CAD (coronary artery disease), native coronary artery- (present on admission)  Assessment & Plan  From chart review patient appears of had CAD with a stent placed in the past.  Unclear when this was placed however greater than 1 year ago.  Continue home atorvastatin.  Continue home ASA on discharge.    Elevated MCV- (present on admission)  Assessment & Plan  .9.  B12 and folate ordered  TSH wnl.    Stage 3a chronic kidney disease (HCC)- (present on admission)  Assessment & Plan  History of CKD, eGFR ~60  Avoid nephrotoxic agents    Mixed hyperlipidemia- (present on admission)  Assessment & Plan  Continue statin    Insomnia- (present on admission)  Assessment & Plan  May contribute to delirium.   Continue melatonin.  Trial trazodone.       VTE prophylaxis: pharmacologic prophylaxis  contraindicated due to Pending surgery    I have performed a physical exam and reviewed and updated ROS and Plan today (1/26/2022). In review of yesterday's note (1/25/2022), there are no changes except as documented above.    Interventions to be considered in all patients in order to minimize the risk of delirium.   -do not disturb patient (vitals or lab draws) between the hours of 10 PM and 6 AM.  -ideally the patient should not sleep during the day and we should avoid day time naps.   -up in chair for meals  -ambulate at least three times daily, as able  -watch for constipation  -timed voiding - ask patient is she would like to go to the bathroom q 2-3 hours, except during the do not disturb hours.   -remove all necessary lines (central lines, peripheral IVs, feeding tubes, leslie catheters)  -unless patient has shown harm to self or others I would recommend against use of restraints - either chemical or physical (antipsychotics)   -minimize polypharmacy, do not dose medication during sleep hours

## 2022-01-27 NOTE — PROGRESS NOTES
[facility] Medicine Daily Progress Note    Date of Service  1/27/2022    Chief Complaint  Navin Suazo is a 89 y.o. male admitted 1/19/2022 with right hip pain    Hospital Course  Navin Suazo is a 89 y.o. male who presented 1/19/2022 with fall.  PMH of dyslipidemia, CKD 3.  Patient tripped down some stairs, fell down on the last step and hit the concrete on the right side earlier this evening.  Did not has hit his head, denies LOC, most of the pain is on the right way he also has some left hip pain as well.  CT head, C-spine with no acute abnormality.  CT pelvis shows angulated and displaced transcervical right femoral neck fracture.  EDP spoke with Ortho who will evaluate the patient for surgery tomorrow.    Interval Problem Update  POD 3 s/p right hip fx repair.   BP normalized. CHAPITO resolved after IVF bolus. H/H stable.  Complaining of insomnia. Melatonin does not work for him. Trial trazodone.  Seeing distressing spider webs in the corners of the room and knows that they are not there. Provided reassurance.  Voiding well, last BM today. Delirium prevention measures.    Motivated, may be appropriate candidate for inpatient rehab. Referral pending.  His wife has parkinsons, is cared for by their daughter, who can not care for both.    1/25-he is slight agitated. Remain cooperative. He believes that his daughter is stuck in a room. He talks about PD. He hopes to talk to director from [facility]. He called his son later telling to his son that he has bugs on his room. Spoke to his daughter. This is not his baseline. He drinks alcohol 5 shots whisk a week, and a glass of wine every dinner.     1/26- he had a rough night. He was placed on restrain. Sleeping during the day. UA no signs of infection. Ammonia level normal. Cannot rule out alcohol withdrawal agitation. No neuro deficits. Continue to monitor. If again agitated tomorrow, I will order brain MRI.     1/27- doing better. A&O x4. Cooperative, not  hallucinating. No need for MRI. meds adjusted, stop trazodone and increased Seroquel to 50 mg nightly. Off sitter. Hope for SNF tomorrow.      I have personally seen and examined the patient at bedside. I discussed the plan of care with patient, family, bedside RN, charge RN,  and pharmacy.    Consultants/Specialty  orthopedics    Code Status  Full Code    Disposition  Patient is medically cleared for discharge.   Anticipate discharge to to skilled nursing facility vs inpatient rehab.  I have placed the appropriate orders for post-discharge needs.    Review of Systems  Review of Systems   Unable to perform ROS: Mental status change        Physical Exam  Temp:  [36.7 °C (98 °F)-37.2 °C (99 °F)] 37.2 °C (99 °F)  Pulse:  [] 100  Resp:  [16-18] 16  BP: (118-160)/(63-83) 129/83  SpO2:  [90 %-96 %] 92 %    Physical Exam  Vitals and nursing note reviewed.   Constitutional:       General: He is not in acute distress.     Appearance: Normal appearance.   HENT:      Head: Normocephalic and atraumatic.      Right Ear: External ear normal.      Left Ear: External ear normal.      Nose: Nose normal. No congestion.      Mouth/Throat:      Mouth: Mucous membranes are moist.      Pharynx: Oropharynx is clear.   Eyes:      General: No scleral icterus.     Conjunctiva/sclera: Conjunctivae normal.   Cardiovascular:      Rate and Rhythm: Normal rate and regular rhythm.      Heart sounds: Normal heart sounds.   Pulmonary:      Effort: No respiratory distress.      Breath sounds: Normal breath sounds. No wheezing or rales.   Abdominal:      General: Abdomen is flat. Bowel sounds are normal. There is no distension.      Tenderness: There is no abdominal tenderness.   Musculoskeletal:      Cervical back: Neck supple. No rigidity.      Right lower leg: No edema.      Left lower leg: No edema.      Comments: Right hip surgical dressing in place, c/d/i without strikethrough. Surrounding skin nonerythematous, nonedematous,  without ecchymosis.   Skin:     General: Skin is warm and dry.   Neurological:      Mental Status: He is alert and oriented to person, place, and time.      Cranial Nerves: No cranial nerve deficit.   Psychiatric:         Mood and Affect: Mood normal.         Thought Content: Thought content normal.         Judgment: Judgment normal.         Fluids    Intake/Output Summary (Last 24 hours) at 1/27/2022 1434  Last data filed at 1/27/2022 0802  Gross per 24 hour   Intake 120 ml   Output 475 ml   Net -355 ml       Laboratory  Recent Labs     01/25/22  0837   WBC 9.2   RBC 3.54*   HEMOGLOBIN 12.2*   HEMATOCRIT 35.9*   .4*   MCH 34.5*   MCHC 34.0   RDW 44.8   PLATELETCT 212   MPV 10.7     Recent Labs     01/25/22  0837   SODIUM 138   POTASSIUM 3.6   CHLORIDE 102   CO2 20   GLUCOSE 120*   BUN 23*   CREATININE 0.91   CALCIUM 8.4*                   Imaging  DX-CHEST-LIMITED (1 VIEW)   Final Result      1.  Cardiomegaly.   2.  Slight interstitial prominence. No consolidation or pleural effusion.      DX-FEMUR-2+ RIGHT   Final Result      Mildly angulated and displaced transcervical right femoral neck fracture.      DX-PELVIS-1 OR 2 VIEWS   Final Result      Mildly displaced transcervical right femoral neck fracture.      CT-PELVIS W/O PLUS RECONS   Final Result      Mildly comminuted, angulated and displaced transcervical right femoral neck fracture.      CT-HEAD W/O   Final Result      1.  Chronic ischemic changes.   2.  No acute intracranial abnormality.         CT-CSPINE WITHOUT PLUS RECONS   Final Result      1.  No acute fracture or dislocation of the cervical spine.   2.  Mild degeneration.   3.  Severe carotid calcified plaque.           Assessment/Plan  * Closed right hip fracture, initial encounter (HCC)- (present on admission)  Assessment & Plan  Mechanical fall down some stairs prior to admission.  CT pelvis shows right femoral neck fracture  S/p right hip fx repair on 1/21. Appreciate ortho  recommendations.  Pain well controlled, continue current regimen. Wean IV medications as tolerated.  Motivated, may be appropriate candidate for inpatient rehab. Referral pending.  1/25- he is taking NSAIDs only. No narcotic. Continue to monitor    Encephalopathy- (present on admission)  Assessment & Plan  1/25- this is not his baseline. Talked to his daughter. + visual hallucinations   UA to rule out UTI. Not symptomatic   Cannot rule out alcoholic withdrawal ??  No neuro deficits   Also hospital delirium is on differential.   Otherwise pt has no history of dementia per his daughter and he function well with the help of his daughter at home prior to this   Will consider brain MRI if not improving in couple of days   1/26- about the same, slight better per rehab physician. Continue to monitor. No signs of UTI, ammonia level normal. Continue to monitor.  1/27- close to his baseline. No further testing. Medically cleared for rehab     CHAPITO (acute kidney injury) (HCC)  Assessment & Plan  Developed postoperatively in conjunction with mild hypotension and acute blood loss anemia. Suspect hypovolemia.  Resolved after LR bolus.   Monitor.    Acute blood loss anemia  Assessment & Plan  Hgb dropped from 14.6 to 12.7 postoperatively.  Iron studies reveal low iron, TIBC. Supplement iron.  Monitor.    CAD (coronary artery disease), native coronary artery- (present on admission)  Assessment & Plan  From chart review patient appears of had CAD with a stent placed in the past.  Unclear when this was placed however greater than 1 year ago.  Continue home atorvastatin.  Continue home ASA on discharge.    Elevated MCV- (present on admission)  Assessment & Plan  .9.  B12 and folate ordered  TSH wnl.    Stage 3a chronic kidney disease (HCC)- (present on admission)  Assessment & Plan  History of CKD, eGFR ~60  Avoid nephrotoxic agents    Mixed hyperlipidemia- (present on admission)  Assessment & Plan  Continue statin    Insomnia-  (present on admission)  Assessment & Plan  May contribute to delirium.   seroquel increased to 50 mg.   Trazodone and melatonin increase risk for Sun down per PMR       VTE prophylaxis: pharmacologic prophylaxis contraindicated due to Pending surgery    I have performed a physical exam and reviewed and updated ROS and Plan today (1/27/2022). In review of yesterday's note (1/26/2022), there are no changes except as documented above.    Interventions to be considered in all patients in order to minimize the risk of delirium.   -do not disturb patient (vitals or lab draws) between the hours of 10 PM and 6 AM.  -ideally the patient should not sleep during the day and we should avoid day time naps.   -up in chair for meals  -ambulate at least three times daily, as able  -watch for constipation  -timed voiding - ask patient is she would like to go to the bathroom q 2-3 hours, except during the do not disturb hours.   -remove all necessary lines (central lines, peripheral IVs, feeding tubes, leslie catheters)  -unless patient has shown harm to self or others I would recommend against use of restraints - either chemical or physical (antipsychotics)   -minimize polypharmacy, do not dose medication during sleep hours

## 2022-01-27 NOTE — DISCHARGE INSTRUCTIONS
Discharge Instructions    Discharged to home by car with relative. Discharged via wheelchair, hospital escort: Yes.  Special equipment needed: Not Applicable    Be sure to schedule a follow-up appointment with your primary care doctor or any specialists as instructed.     Discharge Plan:   Influenza Vaccine Indication: Patient Refuses (Does not want to get until feeling better)    I understand that a diet low in cholesterol, fat, and sodium is recommended for good health. Unless I have been given specific instructions below for another diet, I accept this instruction as my diet prescription.   Other diet: Regular    Special Instructions: Weight bearing as tolerated. Follow up with Dr. Roman in 2 weeks for wound check, sutures/staple removal.     Hip Fracture Treated With ORIF, Care After  This sheet gives you information about how to care for yourself after your procedure. Your health care provider may also give you more specific instructions. If you have problems or questions, contact your health care provider.  What can I expect after the procedure?  After the procedure, it is common to have:  · Pain. You will be given medicines to treat this.  · Swelling.  · Difficulty walking.  · Some redness or bruising around the incision.  · A small amount of fluid or blood from the incision.  Follow these instructions at home:  Medicines  · Take over-the-counter and prescription medicines only as told by your health care provider.  · You may be given a blood thinner to take for up to six weeks. This will help reduce the risk of developing a blood clot. It is important to use this medicine exactly as directed.  · You may be given calcium and vitamin D supplements to strengthen your bones.  · If you are taking prescription pain medicine, take actions to prevent or treat constipation. Your health care provider may recommend that you:  ? Drink enough fluid to keep your urine pale yellow.  ? Eat foods that are high in fiber,  such as fresh fruits and vegetables, whole grains, and beans.  ? Limit foods that are high in fat and processed sugars, such as fried or sweet foods.  ? Take an over-the-counter or prescription medicine for constipation.  Bathing  · Do not take baths, swim, or use a hot tub until your health care provider approves. Ask your health care provider if you can take showers. You may only be allowed to take sponge baths.  · Keep the bandage (dressing) dry until your health care provider says it can be removed.  Incision care    · Follow instructions from your health care provider about how to take care of your incision. Make sure you:  ? Wash your hands with soap and water before you change your dressing. If soap and water are not available, use hand .  ? Change your dressing as told by your health care provider.  ? Leave stitches (sutures), skin glue, or adhesive strips in place. These skin closures may need to stay in place for 2 weeks or longer. If adhesive strip edges start to loosen and curl up, you may trim the loose edges. Do not remove adhesive strips completely unless your health care provider tells you to do that.  · Check your incision area every day for signs of infection. Check for:  ? More redness, swelling, or pain.  ? More fluid or blood.  ? Warmth.  ? Pus or a bad smell.  Managing pain, stiffness, and swelling    · If directed, put ice on the affected area to prevent pain and swelling.  ? Put ice in a plastic bag.  ? Place a towel between your skin and the bag.  ? Leave the ice on for 20 minutes, 2-3 times a day.  · Move your toes often to avoid stiffness and to lessen swelling.  · Raise (elevate) your leg above the level of your heart while you are sitting or lying down. To do this, try putting a few pillows under your leg.  Activity    · Return to your normal activities as told by your health care provider. Ask your health care provider what activities are safe for you.  · Do exercises as told  by your health care provider or physical therapist. This will help make your hip stronger and help you recover more quickly.  · Do not use your injured limb to support (bear) your body weight until your health care provider says that you can. Follow weight-bearing restrictions as told. Use crutches or other devices to help you move around (assistive devices) as directed.  · You may feel most comfortable using a raised surface when sitting on the toilet or in a chair.  · Consider using a toilet seat riser over the toilet for comfort.  Driving  · Do not drive or use heavy machinery while taking prescription pain medicine.  · Ask your health care provider when it is safe for you to drive.  General instructions  · Wear compression stockings as told by your health care provider. These stockings help to prevent blood clots and reduce swelling in your legs.  · Do not use any products that contain nicotine or tobacco, such as cigarettes and e-cigarettes. These can delay bone healing. If you need help quitting, ask your health care provider.  · Keep all follow-up visits as told by your health care provider. This is important. This may include visits for:  ? Physical therapy.  ? Screening for osteoporosis. Osteoporosis is thinning and loss of density in your bones.  Contact a health care provider if you:  · Have a fever.  · Have pain that is not helped with medicine.  · Have more redness, swelling, or pain at your incision area.  · Have more fluid or blood coming from your incision or leaking through your dressing.  · Notice that your incision feels warm to the touch.  · Have pus or a bad smell coming from your incision area.  Get help right away if you:  · Notice that the edges of your incision have come apart after the sutures or staples have been removed.  · Have pain, warmth, or tenderness in the back of your lower leg (calf).  · Have tingling or numbness in your leg.  · Have a pale and cold leg.  · Have trouble  breathing.  · Have chest pain.  Summary  · After the procedure, it is common to have some pain and swelling.  · Take pain medicines as directed by your health care provider. Icing may also help with pain control.  · Contact your health care provider if you have signs of infection, severe pain, or more fluid or blood coming from your incision.  This information is not intended to replace advice given to you by your health care provider. Make sure you discuss any questions you have with your health care provider.  Document Released: 07/15/2015 Document Revised: 09/07/2019 Document Reviewed: 01/28/2019  [employer] Patient Education © 2020 [employer]   Discharge instructions for the Orthopedic Patient    Follow up with Primary Care Physician within 2 weeks of discharge to home, regarding:  Review of medications and diagnostic testing.  Surveillance for medical complications.  Workup and treatment of osteoporosis, if appropriate.     -Is this a Hip/Knee/Shoulder Joint Replacement patient? No    -Is this patient being discharged with medication to prevent blood clots?  Yes, Aspirin Aspirin, ASA oral tablets  What is this medicine?  ASPIRIN (AS pir in) is a pain reliever. It is used to treat mild pain and fever. This medicine is also used as directed by a doctor to prevent and to treat heart attacks, to prevent strokes and blood clots, and to treat arthritis or inflammation.  This medicine may be used for other purposes; ask your health care provider or pharmacist if you have questions.  COMMON BRAND NAME(S): Aspir-Low, Aspir-Sasha, Aspirtab, Tariq Advanced Aspirin, Tariq Aspirin, Tariq Aspirin Extra Strength, Tariq Aspirin Plus, Tariq Extra Strength, Tariq Extra Strength Plus, Tariq Genuine Aspirin, Tariq Womens Aspirin, Bufferin, Bufferin Extra Strength, Bufferin Low Dose  What should I tell my health care provider before I take this medicine?  They need to know if you have any of these  conditions:  · anemia  · asthma  · bleeding problems  · child with chickenpox, the flu, or other viral infection  · diabetes  · gout  · if you frequently drink alcohol containing drinks  · kidney disease  · liver disease  · low level of vitamin K  · lupus  · smoke tobacco  · stomach ulcers or other problems  · an unusual or allergic reaction to aspirin, tartrazine dye, other medicines, dyes, or preservatives  · pregnant or trying to get pregnant  · breast-feeding  How should I use this medicine?  Take this medicine by mouth with a glass of water. Follow the directions on the package or prescription label. You can take this medicine with or without food. If it upsets your stomach, take it with food. Do not take your medicine more often than directed.  Talk to your pediatrician regarding the use of this medicine in children. While this drug may be prescribed for children as young as 12 years of age for selected conditions, precautions do apply. Children and teenagers should not use this medicine to treat chicken pox or flu symptoms unless directed by a doctor.  Patients over 65 years old may have a stronger reaction and need a smaller dose.  Overdosage: If you think you have taken too much of this medicine contact a poison control center or emergency room at once.  NOTE: This medicine is only for you. Do not share this medicine with others.  What if I miss a dose?  If you are taking this medicine on a regular schedule and miss a dose, take it as soon as you can. If it is almost time for your next dose, take only that dose. Do not take double or extra doses.  What may interact with this medicine?  Do not take this medicine with any of the following medications:  · cidofovir  · ketorolac  · probenecid  This medicine may also interact with the following medications:  · alcohol  · alendronate  · bismuth subsalicylate  · flavocoxid  · herbal supplements like feverfew, garlic, emilia, ginkgo biloba, horse  chestnut  · medicines for diabetes or glaucoma like acetazolamide, methazolamide  · medicines for gout  · medicines that treat or prevent blood clots like enoxaparin, heparin, ticlopidine, warfarin  · other aspirin and aspirin-like medicines  · NSAIDs, medicines for pain and inflammation, like ibuprofen or naproxen  · pemetrexed  · sulfinpyrazone  · varicella live vaccine  This list may not describe all possible interactions. Give your health care provider a list of all the medicines, herbs, non-prescription drugs, or dietary supplements you use. Also tell them if you smoke, drink alcohol, or use illegal drugs. Some items may interact with your medicine.  What should I watch for while using this medicine?  If you are treating yourself for pain, tell your doctor or health care professional if the pain lasts more than 10 days, if it gets worse, or if there is a new or different kind of pain. Tell your doctor if you see redness or swelling. Also, check with your doctor if you have a fever that lasts for more than 3 days. Only take this medicine to prevent heart attacks or blood clotting if prescribed by your doctor or health care professional.  Do not take aspirin or aspirin-like medicines with this medicine. Too much aspirin can be dangerous. Always read the labels carefully.  This medicine can irritate your stomach or cause bleeding problems. Do not smoke cigarettes or drink alcohol while taking this medicine. Do not lie down for 30 minutes after taking this medicine to prevent irritation to your throat.  If you are scheduled for any medical or dental procedure, tell your healthcare provider that you are taking this medicine. You may need to stop taking this medicine before the procedure.  This medicine may be used to treat migraines. If you take migraine medicines for 10 or more days a month, your migraines may get worse. Keep a diary of headache days and medicine use. Contact your healthcare professional if your  migraine attacks occur more frequently.  What side effects may I notice from receiving this medicine?  Side effects that you should report to your doctor or health care professional as soon as possible:  · allergic reactions like skin rash, itching or hives, swelling of the face, lips, or tongue  · breathing problems  · changes in hearing, ringing in the ears  · confusion  · general ill feeling or flu-like symptoms  · pain on swallowing  · redness, blistering, peeling or loosening of the skin, including inside the mouth or nose  · signs and symptoms of bleeding such as bloody or black, tarry stools; red or dark-brown urine; spitting up blood or brown material that looks like coffee grounds; red spots on the skin; unusual bruising or bleeding from the eye, gums, or nose  · trouble passing urine or change in the amount of urine  · unusually weak or tired  · yellowing of the eyes or skin  Side effects that usually do not require medical attention (report to your doctor or health care professional if they continue or are bothersome):  · diarrhea or constipation  · headache  · nausea, vomiting  · stomach gas, heartburn  This list may not describe all possible side effects. Call your doctor for medical advice about side effects. You may report side effects to FDA at 7-905-FDA-4569.  Where should I keep my medicine?  Keep out of the reach of children.  Store at room temperature between 15 and 30 degrees C (59 and 86 degrees F). Protect from heat and moisture. Do not use this medicine if it has a strong vinegar smell. Throw away any unused medicine after the expiration date.  NOTE: This sheet is a summary. It may not cover all possible information. If you have questions about this medicine, talk to your doctor, pharmacist, or health care provider.  © 2020 [employer] (2018-01-30 10:42:13)   and None    · Is patient discharged on Warfarin / Coumadin?   No     Depression / Suicide Risk    As you are discharged from  this [facility] facility, it is important to learn how to keep safe from harming yourself.    Recognize the warning signs:  · Abrupt changes in personality, positive or negative- including increase in energy   · Giving away possessions  · Change in eating patterns- significant weight changes-  positive or negative  · Change in sleeping patterns- unable to sleep or sleeping all the time   · Unwillingness or inability to communicate  · Depression  · Unusual sadness, discouragement and loneliness  · Talk of wanting to die  · Neglect of personal appearance   · Rebelliousness- reckless behavior  · Withdrawal from people/activities they love  · Confusion- inability to concentrate     If you or a loved one observes any of these behaviors or has concerns about self-harm, here's what you can do:  · Talk about it- your feelings and reasons for harming yourself  · Remove any means that you might use to hurt yourself (examples: pills, rope, extension cords, firearm)  · Get professional help from the community (Mental Health, Substance Abuse, psychological counseling)  · Do not be alone:Call your Safe Contact- someone whom you trust who will be there for you.  · Call your local CRISIS HOTLINE 177-3917 or 609-763-3847  · Call your local Children's Mobile Crisis Response Team [place] (163) 353-6513 or www.[employer]  · Call the toll free National Suicide Prevention Hotlines   · National Suicide Prevention Lifeline 882-669-OIAM (5407)  · National Hope Line Network 800-SUICIDE (284-0492)

## 2022-01-27 NOTE — PROGRESS NOTES
"Slight confusion persists    /83   Pulse 100   Temp 37.2 °C (99 °F) (Temporal)   Resp 16   Ht 1.803 m (5' 11\")   Wt 90.7 kg (200 lb)   SpO2 92%     Recent Labs     01/25/22  0837   WBC 9.2   RBC 3.54*   HEMOGLOBIN 12.2*   HEMATOCRIT 35.9*   .4*   MCH 34.5*   MCHC 34.0   RDW 44.8   PLATELETCT 212   MPV 10.7       POD#6  S/P Left hip kris    Plan:  DVT Prophylaxis- TEDS/SCDs, lovenox while in hospital, ASA 81 mg PO BID as outpatient  Weight Bearing Status-WBAT  PT/OT  Antibiotics: None  Case Coordination          "

## 2022-01-27 NOTE — PROGRESS NOTES
"Received report from day shift RNBeulah. Assumed care of patient at 1900. Assessment complete. Patient is A&0 x 4, stating 0/10 pain. Encouraged patient to eat dinner, patient declined saying he \"has no appetite.\" Patient resting comfortably in bed, all needs met at this time. Bed alarm in place, treaded socks in place. Safety sitter at bedside. Patient educated to call for assistance before getting up, patient verbalized understanding however reinforcement is needed.    2304 Placed order for dietary consult due to poor oral intake.     0130 /63. Charge RNMckayla inserted IV for potential MRI, patient tolerated well. Bilateral heel mepilex applied. Patient resting comfortably in bed.    0430 Re-assessed pain, patient stating 0/10 pain, however blood pressure elevated 155/70. Patient medicated for pain per MAR.    0710 Hand off report given to day shift RNYris.  "

## 2022-01-27 NOTE — CARE PLAN
The patient is Watcher - Medium risk of patient condition declining or worsening    Shift Goals  Clinical Goals: safety  Patient Goals: rest  Family Goals: n/a    Progress made toward(s) clinical / shift goals:  yes    Problem: Pain - Standard  Goal: Alleviation of pain or a reduction in pain to the patient’s comfort goal  Outcome: Progressing     Problem: Knowledge Deficit - Standard  Goal: Patient and family/care givers will demonstrate understanding of plan of care, disease process/condition, diagnostic tests and medications  Outcome: Progressing     Problem: Skin Integrity  Goal: Skin integrity is maintained or improved  Outcome: Progressing     Problem: Fall Risk  Goal: Patient will remain free from falls  Outcome: Progressing   Treaded socks in place, bed alarm in place, hourly rounding in place. Safety sitter at bedside. Patient educated to call for assistance before getting up. Patient verbalized understanding, however reinforcement is needed.    Problem: Safety - Medical Restraint  Goal: Remains free of injury from restraints (Restraint for Interference with Medical Device)  Outcome: Progressing  Goal: Free from restraint(s) (Restraint for Interference with Medical Device)  Outcome: Progressing   Patient is free of restraints at this time. Safety sitter is at bedside.

## 2022-01-28 ENCOUNTER — APPOINTMENT (OUTPATIENT)
Dept: RADIOLOGY | Facility: MEDICAL CENTER | Age: 87
DRG: 521 | End: 2022-01-28
Attending: INTERNAL MEDICINE
Payer: MEDICARE

## 2022-01-28 PROBLEM — Z66 DNR (DO NOT RESUSCITATE): Status: ACTIVE | Noted: 2022-01-28

## 2022-01-28 PROBLEM — R55 VASOVAGAL SYNCOPE: Status: ACTIVE | Noted: 2022-01-28

## 2022-01-28 LAB
EKG IMPRESSION: NORMAL
GLUCOSE BLD-MCNC: 108 MG/DL (ref 65–99)
LACTATE BLD-SCNC: 1 MMOL/L (ref 0.5–2)
LACTATE BLD-SCNC: 1.1 MMOL/L (ref 0.5–2)
LACTATE BLD-SCNC: 2 MMOL/L (ref 0.5–2)
TROPONIN T SERPL-MCNC: 31 NG/L (ref 6–19)
TROPONIN T SERPL-MCNC: 36 NG/L (ref 6–19)

## 2022-01-28 PROCEDURE — A9270 NON-COVERED ITEM OR SERVICE: HCPCS | Performed by: STUDENT IN AN ORGANIZED HEALTH CARE EDUCATION/TRAINING PROGRAM

## 2022-01-28 PROCEDURE — 700111 HCHG RX REV CODE 636 W/ 250 OVERRIDE (IP): Performed by: ORTHOPAEDIC SURGERY

## 2022-01-28 PROCEDURE — 99233 SBSQ HOSP IP/OBS HIGH 50: CPT | Performed by: INTERNAL MEDICINE

## 2022-01-28 PROCEDURE — 71045 X-RAY EXAM CHEST 1 VIEW: CPT

## 2022-01-28 PROCEDURE — 700102 HCHG RX REV CODE 250 W/ 637 OVERRIDE(OP): Performed by: ORTHOPAEDIC SURGERY

## 2022-01-28 PROCEDURE — 99024 POSTOP FOLLOW-UP VISIT: CPT | Performed by: ORTHOPAEDIC SURGERY

## 2022-01-28 PROCEDURE — A9270 NON-COVERED ITEM OR SERVICE: HCPCS | Performed by: ORTHOPAEDIC SURGERY

## 2022-01-28 PROCEDURE — 82962 GLUCOSE BLOOD TEST: CPT

## 2022-01-28 PROCEDURE — A9270 NON-COVERED ITEM OR SERVICE: HCPCS | Performed by: INTERNAL MEDICINE

## 2022-01-28 PROCEDURE — 700101 HCHG RX REV CODE 250: Performed by: PHYSICAL MEDICINE & REHABILITATION

## 2022-01-28 PROCEDURE — 93005 ELECTROCARDIOGRAM TRACING: CPT | Performed by: INTERNAL MEDICINE

## 2022-01-28 PROCEDURE — 700105 HCHG RX REV CODE 258: Performed by: INTERNAL MEDICINE

## 2022-01-28 PROCEDURE — 36415 COLL VENOUS BLD VENIPUNCTURE: CPT

## 2022-01-28 PROCEDURE — 700102 HCHG RX REV CODE 250 W/ 637 OVERRIDE(OP): Performed by: STUDENT IN AN ORGANIZED HEALTH CARE EDUCATION/TRAINING PROGRAM

## 2022-01-28 PROCEDURE — 87040 BLOOD CULTURE FOR BACTERIA: CPT | Mod: 91

## 2022-01-28 PROCEDURE — 93010 ELECTROCARDIOGRAM REPORT: CPT | Performed by: INTERNAL MEDICINE

## 2022-01-28 PROCEDURE — 770001 HCHG ROOM/CARE - MED/SURG/GYN PRIV*

## 2022-01-28 PROCEDURE — 700102 HCHG RX REV CODE 250 W/ 637 OVERRIDE(OP): Performed by: INTERNAL MEDICINE

## 2022-01-28 PROCEDURE — 84484 ASSAY OF TROPONIN QUANT: CPT | Mod: 91

## 2022-01-28 PROCEDURE — 97530 THERAPEUTIC ACTIVITIES: CPT | Mod: CQ

## 2022-01-28 PROCEDURE — 83605 ASSAY OF LACTIC ACID: CPT

## 2022-01-28 RX ORDER — SODIUM CHLORIDE, SODIUM LACTATE, POTASSIUM CHLORIDE, CALCIUM CHLORIDE 600; 310; 30; 20 MG/100ML; MG/100ML; MG/100ML; MG/100ML
INJECTION, SOLUTION INTRAVENOUS CONTINUOUS
Status: DISCONTINUED | OUTPATIENT
Start: 2022-01-28 | End: 2022-01-31

## 2022-01-28 RX ORDER — SODIUM CHLORIDE, SODIUM LACTATE, POTASSIUM CHLORIDE, AND CALCIUM CHLORIDE .6; .31; .03; .02 G/100ML; G/100ML; G/100ML; G/100ML
500 INJECTION, SOLUTION INTRAVENOUS ONCE
Status: ACTIVE | OUTPATIENT
Start: 2022-01-28 | End: 2022-01-29

## 2022-01-28 RX ADMIN — CYANOCOBALAMIN TAB 500 MCG 1000 MCG: 500 TAB at 05:06

## 2022-01-28 RX ADMIN — QUETIAPINE FUMARATE 50 MG: 25 TABLET ORAL at 20:40

## 2022-01-28 RX ADMIN — SODIUM CHLORIDE, POTASSIUM CHLORIDE, SODIUM LACTATE AND CALCIUM CHLORIDE: 600; 310; 30; 20 INJECTION, SOLUTION INTRAVENOUS at 17:05

## 2022-01-28 RX ADMIN — FERROUS SULFATE TAB 325 MG (65 MG ELEMENTAL FE) 325 MG: 325 (65 FE) TAB at 08:14

## 2022-01-28 RX ADMIN — DOCUSATE SODIUM 100 MG: 100 CAPSULE, LIQUID FILLED ORAL at 05:06

## 2022-01-28 RX ADMIN — ACETAMINOPHEN 650 MG: 325 TABLET, FILM COATED ORAL at 23:45

## 2022-01-28 RX ADMIN — LIDOCAINE 1 PATCH: 50 PATCH TOPICAL at 11:09

## 2022-01-28 RX ADMIN — ONDANSETRON 4 MG: 2 INJECTION INTRAMUSCULAR; INTRAVENOUS at 11:10

## 2022-01-28 RX ADMIN — ATORVASTATIN CALCIUM 40 MG: 40 TABLET, FILM COATED ORAL at 05:06

## 2022-01-28 RX ADMIN — FOLIC ACID 1 MG: 1 TABLET ORAL at 05:06

## 2022-01-28 RX ADMIN — ENOXAPARIN SODIUM 40 MG: 40 INJECTION SUBCUTANEOUS at 05:05

## 2022-01-28 RX ADMIN — MAGNESIUM HYDROXIDE 30 ML: 1200 LIQUID ORAL at 05:05

## 2022-01-28 RX ADMIN — ACETAMINOPHEN 650 MG: 325 TABLET, FILM COATED ORAL at 05:06

## 2022-01-28 ASSESSMENT — COGNITIVE AND FUNCTIONAL STATUS - GENERAL
WALKING IN HOSPITAL ROOM: A LOT
MOBILITY SCORE: 10
MOVING FROM LYING ON BACK TO SITTING ON SIDE OF FLAT BED: UNABLE
MOVING TO AND FROM BED TO CHAIR: UNABLE
STANDING UP FROM CHAIR USING ARMS: A LITTLE
CLIMB 3 TO 5 STEPS WITH RAILING: TOTAL
TURNING FROM BACK TO SIDE WHILE IN FLAT BAD: A LOT
SUGGESTED CMS G CODE MODIFIER MOBILITY: CL

## 2022-01-28 ASSESSMENT — PAIN DESCRIPTION - PAIN TYPE
TYPE: ACUTE PAIN
TYPE: ACUTE PAIN
TYPE: ACUTE PAIN;SURGICAL PAIN
TYPE: ACUTE PAIN;SURGICAL PAIN
TYPE: ACUTE PAIN

## 2022-01-28 ASSESSMENT — GAIT ASSESSMENTS
ASSISTIVE DEVICE: FRONT WHEEL WALKER
GAIT LEVEL OF ASSIST: MINIMAL ASSIST
DISTANCE (FEET): 12

## 2022-01-28 NOTE — PROGRESS NOTES
[facility] Medicine Daily Progress Note    Date of Service  1/28/2022    Chief Complaint  Navin Suazo is a 89 y.o. male admitted 1/19/2022 with right hip pain    Hospital Course  Navin Suazo is a 89 y.o. male who presented 1/19/2022 with fall.  PMH of dyslipidemia, CKD 3.  Patient tripped down some stairs, fell down on the last step and hit the concrete on the right side earlier this evening.  Did not has hit his head, denies LOC, most of the pain is on the right way he also has some left hip pain as well.  CT head, C-spine with no acute abnormality.  CT pelvis shows angulated and displaced transcervical right femoral neck fracture.  EDP spoke with Ortho who will evaluate the patient for surgery tomorrow.    Interval Problem Update  POD 3 s/p right hip fx repair.   BP normalized. CHAPITO resolved after IVF bolus. H/H stable.  Complaining of insomnia. Melatonin does not work for him. Trial trazodone.  Seeing distressing spider webs in the corners of the room and knows that they are not there. Provided reassurance.  Voiding well, last BM today. Delirium prevention measures.    Motivated, may be appropriate candidate for inpatient rehab. Referral pending.  His wife has parkinsons, is cared for by their daughter, who can not care for both.    1/25-he is slight agitated. Remain cooperative. He believes that his daughter is stuck in a room. He talks about PD. He hopes to talk to director from [facility]. He called his son later telling to his son that he has bugs on his room. Spoke to his daughter. This is not his baseline. He drinks alcohol 5 shots whisk a week, and a glass of wine every dinner.     1/26- he had a rough night. He was placed on restrain. Sleeping during the day. UA no signs of infection. Ammonia level normal. Cannot rule out alcohol withdrawal agitation. No neuro deficits. Continue to monitor. If again agitated tomorrow, I will order brain MRI.     1/27- doing better. A&O x4. Cooperative, not  hallucinating. No need for MRI. meds adjusted, stop trazodone and increased Seroquel to 50 mg nightly. Off sitter. Hope for SNF tomorrow.      1/28- doing better in the morning. A/O x4. Agreed with SNF. Daughter and wife present later to visit him. Rapid response was called, pt lethargic and barely responsive. Had a bowel movement, prior to that. BP 80/28. Rapid team, full work up completed. Pt felt better once he was placed back to his bed. Bolus 500 cc given. Trop/blood cultures/lactic acid to follow.   Discussed with daughter and wife about code status and both agreed with DNAR/DNI. Daughter stated that her father is frail, and would not survive resuscitation, it will just cause more pain then good.   Patient was of sound mind and voluntarily participated in the conversation.  Daughter Jessika and wife were present for the conversation.  I discussed advance care planning face to face with the patient and family for at least 12 minutes, including diagnosis, prognosis, plan of care, risks and benefits of any therapies , code status.       I have personally seen and examined the patient at bedside. I discussed the plan of care with patient, family, bedside RN, charge RN,  and pharmacy.    Consultants/Specialty  orthopedics    Code Status  DNAR/DNI    Disposition  Patient is medically cleared for discharge.   Anticipate discharge to to skilled nursing facility vs inpatient rehab.  I have placed the appropriate orders for post-discharge needs.    Review of Systems  Review of Systems   Unable to perform ROS: Mental status change        Physical Exam  Temp:  [36.3 °C (97.4 °F)-37.3 °C (99.1 °F)] 36.3 °C (97.4 °F)  Pulse:  [72-97] 72  Resp:  [16-38] 20  BP: ()/(28-89) 103/67  SpO2:  [77 %-99 %] 97 %    Physical Exam  Vitals and nursing note reviewed.   Constitutional:       General: He is not in acute distress.     Appearance: Normal appearance.   HENT:      Head: Normocephalic and atraumatic.      Right  Ear: External ear normal.      Left Ear: External ear normal.      Nose: Nose normal. No congestion.      Mouth/Throat:      Mouth: Mucous membranes are moist.      Pharynx: Oropharynx is clear.   Eyes:      General: No scleral icterus.     Conjunctiva/sclera: Conjunctivae normal.   Cardiovascular:      Rate and Rhythm: Normal rate and regular rhythm.      Heart sounds: Normal heart sounds.   Pulmonary:      Effort: No respiratory distress.      Breath sounds: Normal breath sounds. No wheezing or rales.   Abdominal:      General: Abdomen is flat. Bowel sounds are normal. There is no distension.      Tenderness: There is no abdominal tenderness.   Musculoskeletal:      Cervical back: Neck supple. No rigidity.      Right lower leg: No edema.      Left lower leg: No edema.      Comments: Right hip surgical dressing in place, c/d/i without strikethrough. Surrounding skin nonerythematous, nonedematous, without ecchymosis.   Skin:     General: Skin is warm and dry.   Neurological:      Mental Status: He is alert and oriented to person, place, and time.      Cranial Nerves: No cranial nerve deficit.   Psychiatric:         Mood and Affect: Mood normal.         Thought Content: Thought content normal.         Judgment: Judgment normal.         Fluids    Intake/Output Summary (Last 24 hours) at 1/28/2022 1543  Last data filed at 1/28/2022 0525  Gross per 24 hour   Intake --   Output 450 ml   Net -450 ml       Laboratory  Recent Labs     01/27/22  1513   WBC 11.4*   RBC 3.43*   HEMOGLOBIN 11.9*   HEMATOCRIT 34.4*   .3*   MCH 34.7*   MCHC 34.6   RDW 43.8   PLATELETCT 278   MPV 9.7     Recent Labs     01/27/22  1513   SODIUM 139   POTASSIUM 3.8   CHLORIDE 105   CO2 22   GLUCOSE 101*   BUN 31*   CREATININE 0.84   CALCIUM 8.2*                   Imaging  DX-CHEST-PORTABLE (1 VIEW)   Final Result      No evidence of acute cardiopulmonary process.      DX-CHEST-LIMITED (1 VIEW)   Final Result      1.  Cardiomegaly.   2.   Slight interstitial prominence. No consolidation or pleural effusion.      DX-FEMUR-2+ RIGHT   Final Result      Mildly angulated and displaced transcervical right femoral neck fracture.      DX-PELVIS-1 OR 2 VIEWS   Final Result      Mildly displaced transcervical right femoral neck fracture.      CT-PELVIS W/O PLUS RECONS   Final Result      Mildly comminuted, angulated and displaced transcervical right femoral neck fracture.      CT-HEAD W/O   Final Result      1.  Chronic ischemic changes.   2.  No acute intracranial abnormality.         CT-CSPINE WITHOUT PLUS RECONS   Final Result      1.  No acute fracture or dislocation of the cervical spine.   2.  Mild degeneration.   3.  Severe carotid calcified plaque.           Assessment/Plan  * Closed right hip fracture, initial encounter (HCC)- (present on admission)  Assessment & Plan  Mechanical fall down some stairs prior to admission.  CT pelvis shows right femoral neck fracture  S/p right hip fx repair on 1/21. Appreciate ortho recommendations.  Pain well controlled, continue current regimen. Wean IV medications as tolerated.  Motivated, may be appropriate candidate for inpatient rehab. Referral pending.  1/25- he is taking NSAIDs only. No narcotic. Continue to monitor    Vasovagal syncope  Assessment & Plan  1/28- rapid team present. All tests ordered. Follow up   CXR unremarkable  EKG unremarkable  Follow up lactic acid, trop, blood cultures  No pain      DNR (do not resuscitate)- (present on admission)  Assessment & Plan  Discussion as per above.   1/28: Discussed with daughter and wife about code status and both agreed with DNAR/DNI. Daughter stated that her father is frail, and would not survive resuscitation, it will just cause more pain then good.   Patient was of sound mind and voluntarily participated in the conversation.  Daughter Jessika and wife were present for the conversation.  I discussed advance care planning face to face with the patient and family  for at least 12 minutes, including diagnosis, prognosis, plan of care, risks and benefits of any therapies , code status.         Encephalopathy- (present on admission)  Assessment & Plan  1/25- this is not his baseline. Talked to his daughter. + visual hallucinations   UA to rule out UTI. Not symptomatic   Cannot rule out alcoholic withdrawal ??  No neuro deficits   Also hospital delirium is on differential.   Otherwise pt has no history of dementia per his daughter and he function well with the help of his daughter at home prior to this   Will consider brain MRI if not improving in couple of days   1/26- about the same, slight better per rehab physician. Continue to monitor. No signs of UTI, ammonia level normal. Continue to monitor.  1/27- close to his baseline. No further testing. Medically cleared for rehab   1/28- at his baseline. Continue to monitor.     CHAPITO (acute kidney injury) (HCC)  Assessment & Plan  Developed postoperatively in conjunction with mild hypotension and acute blood loss anemia. Suspect hypovolemia.  Resolved after LR bolus.   Monitor.    Acute blood loss anemia  Assessment & Plan  Hgb dropped from 14.6 to 12.7 postoperatively.  Iron studies reveal low iron, TIBC. Supplement iron.  Monitor.  1/28- continue to monitor     CAD (coronary artery disease), native coronary artery- (present on admission)  Assessment & Plan  From chart review patient appears of had CAD with a stent placed in the past.  Unclear when this was placed however greater than 1 year ago.  Continue home atorvastatin.  Continue home ASA on discharge.    Elevated MCV- (present on admission)  Assessment & Plan  .9.  B12 and folate ordered  TSH wnl.    Stage 3a chronic kidney disease (HCC)- (present on admission)  Assessment & Plan  History of CKD, eGFR ~60  Avoid nephrotoxic agents    Mixed hyperlipidemia- (present on admission)  Assessment & Plan  Continue statin    Insomnia- (present on admission)  Assessment &  Plan  May contribute to delirium.   seroquel increased to 50 mg.   Trazodone and melatonin increase risk for Sun down per PMR       VTE prophylaxis: pharmacologic prophylaxis contraindicated due to Pending surgery    I have performed a physical exam and reviewed and updated ROS and Plan today (1/28/2022). In review of yesterday's note (1/27/2022), there are no changes except as documented above.    Interventions to be considered in all patients in order to minimize the risk of delirium.   -do not disturb patient (vitals or lab draws) between the hours of 10 PM and 6 AM.  -ideally the patient should not sleep during the day and we should avoid day time naps.   -up in chair for meals  -ambulate at least three times daily, as able  -watch for constipation  -timed voiding - ask patient is she would like to go to the bathroom q 2-3 hours, except during the do not disturb hours.   -remove all necessary lines (central lines, peripheral IVs, feeding tubes, leslie catheters)  -unless patient has shown harm to self or others I would recommend against use of restraints - either chemical or physical (antipsychotics)   -minimize polypharmacy, do not dose medication during sleep hours

## 2022-01-28 NOTE — DISCHARGE PLANNING
Anticipated Discharge Disposition: SNF    Action: Referral sent to [facility], [facility] and [facility]. Acute Rehab was denied.     Barriers to Discharge: Pending SNF acceptance.    Plan: F/U with medical team, SNF and pt.

## 2022-01-28 NOTE — PROGRESS NOTES
Assumed care of patient at 0645. Bedside report received. Assessment complete.  AA&Ox4. Denies CP/SOB.  Reporting 2/10 pain. Declined intervention at this time.   Educated patient regarding pharmacologic and non pharmacologic modalities for pain management.  Skin per flow sheets.  Tolerating diet. Denies N/V.  + void. Last BM 1/24  Pt ambulates x1-2 assist with FWW.  Plan of care discussed, all questions answered. Educated on the importance of calling before getting OOB and pt verbalizes understanding. Educated regarding importance of oral care. Oral care kit at bedside. Call light is within reach, treaded slipper socks on, bed in lowest/ locked position, hourly rounding in place, all needs met at this time.

## 2022-01-28 NOTE — PROGRESS NOTES
"Discharge planning in progress    /77   Pulse 90   Temp 36.5 °C (97.7 °F) (Temporal)   Resp 18   Ht 1.803 m (5' 11\")   Wt 90.7 kg (200 lb)   SpO2 94%     Recent Labs     01/25/22  0837 01/27/22  1513   WBC 9.2 11.4*   RBC 3.54* 3.43*   HEMOGLOBIN 12.2* 11.9*   HEMATOCRIT 35.9* 34.4*   .4* 100.3*   MCH 34.5* 34.7*   MCHC 34.0 34.6   RDW 44.8 43.8   PLATELETCT 212 278   MPV 10.7 9.7         POD#7  S/P Left hip Hemarthroplasty    Plan:  DVT Prophylaxis- TEDS/SCDs, lovenox while in hospital, ASA 81 mg PO BID as outpatient  Weight Bearing Status-WBAT, posterior hip precautions  PT/OT  Antibiotics: None  Case Coordination          "

## 2022-01-28 NOTE — PROGRESS NOTES
1435: Patient's daughter came to nurses station, informed this RN patient had an ice cream cone and a BM. Daughter stated that PT was cleaning his up.  1436: This RN arrived to bedside to assist PT. Patient noted to be lethargic on toilet. Unable to get automatic BP. RN apprentice went to retrieve manual BP cuff. This RN went to retrieve glucometer. When this RN arrived back to bedside patient was noted to have syncopal episode and non responsive. FSBS 108. BP 80/28. Charge nurse at bedside. Rapid response called.    L forearm skin tear noted after rapid response. Dressed with mepitel and biotin.

## 2022-01-28 NOTE — ASSESSMENT & PLAN NOTE
Discussion as per above.   1/28: Discussed with daughter and wife about code status and both agreed with DNAR/DNI. Daughter stated that her father is frail, and would not survive resuscitation, it will just cause more pain then good.   Patient was of sound mind and voluntarily participated in the conversation.  Daughter Jessika and wife were present for the conversation.  I discussed advance care planning face to face with the patient and family for at least 12 minutes, including diagnosis, prognosis, plan of care, risks and benefits of any therapies , code status.

## 2022-01-28 NOTE — CODE DOCUMENTATION
RR called AMS.  Pt working with PT in BR, working on sit to stands.  Pt became lethargic and unresponsive with slurred speech upon arousal.  Question syncope/vagal with BM?  Dr Merino at bedside upon RRT arrival to room.  Orders received and implemented.  Pt assisted x3 to wheelchair and back to bed.  Pt lethargic and slow to arouse, but following commands.  Pt back to baseline per bedside RN after approximately 10 minutes past initial event.

## 2022-01-28 NOTE — CARE PLAN
Problem: Pain - Standard  Goal: Alleviation of pain or a reduction in pain to the patient’s comfort goal  Outcome: Progressing     Problem: Knowledge Deficit - Standard  Goal: Patient and family/care givers will demonstrate understanding of plan of care, disease process/condition, diagnostic tests and medications  Outcome: Progressing     The patient is Stable - Low risk of patient condition declining or worsening    Shift Goals  Clinical Goals: safety, free from falls  Patient Goals: rest  Family Goals: n/a    Progress made toward(s) clinical / shift goals:  bed alarm on and audible for safety. Patient remains free from falls. Patient able to rest comfortably.     Patient is not progressing towards the following goals:

## 2022-01-28 NOTE — PROGRESS NOTES
"   Orthopaedic Progress Note    Interval changes:  Patient doing well  Dressing CDI  RLE dressings CDI    ROS - Patient denies any new issues but is confused.  Pain well controlled.    /73   Pulse 79   Temp 36.7 °C (98 °F) (Temporal)   Resp 16   Ht 1.803 m (5' 11\")   Wt 90.7 kg (200 lb)   SpO2 92%       Patient seen and examined  No acute distress  Breathing non labored  RRR  RLE dressings CDI, DNVI, moves all toes, cap refill <2 sec.     Recent Labs     01/27/22  1513   WBC 11.4*   RBC 3.43*   HEMOGLOBIN 11.9*   HEMATOCRIT 34.4*   .3*   MCH 34.7*   MCHC 34.6   RDW 43.8   PLATELETCT 278   MPV 9.7       Active Hospital Problems    Diagnosis    • Encephalopathy [G93.40]    • Acute blood loss anemia [D62]    • CHAPITO (acute kidney injury) ([facility]) [N17.9]    • Stage 3a chronic kidney disease ([facility]) [N18.31]    • Elevated MCV [R71.8]    • CAD (coronary artery disease), native coronary artery [I25.10]    • Closed right hip fracture, initial encounter ([facility]) [S72.001A]    • Mixed hyperlipidemia [E78.2]    • Insomnia [G47.00]        Assessment/Plan:  Patient doing well  Dressing CDI  RLE dressings CDI  POD#6 S/P Open treatment of right femoral fracture, proximal end, neck with prosthetic replacement  Wt bearing status - WBAT  Wound care/Drains - Dressings to be changed every other day by nursing  Future Procedures - none planned   Lovenox: Start 1/22, Duration-until ambulatory > 150'  Sutures/Staples out- 14 days post operatively  PT/OT-initiated  Antibiotics:  Perioperative completed  DVT Prophylaxis- TEDS/SCDs/Foot pumps  Raya-none  Case Coordination for Discharge Planning - Disposition SNF     I have performed a physical exam and reviewed and updated ROS and Plan today (1/27). In review of yesterday's note (1/26), there are no changes except as documented above.   "

## 2022-01-28 NOTE — PROGRESS NOTES
Received report from day shift RNTerese. Assumed care of patient at 1900.     2014 Assessment complete. Patient stating 3/10 pain, medicated per MAR. Patient declined ice pack for pain. Patient still complaining of no appetite. Patient resting comfortably in bed, all needs met at this time. Bed alarm in place, treaded socks in place. Patient educated to call for assistance before getting up, patient verbalized understanding.     2148 Daughter called for update on patient. Patient also spoke to daughter on the phone. Patient resting comfortably in bed.     0025 Patient resting comfortably in bed, all needs met at this time. Using the urinal.    0210 Patient sleeping comfortably in bed.    0525 Patient resting comfortably in bed. Emptied urinal 250mL.

## 2022-01-28 NOTE — DISCHARGE PLANNING
Agency/Facility Name: [facility]  Spoke To: Monica  Outcome: Pt accepted. Asked facility to submit for auth. Bed availability pending ins auth      CM informed

## 2022-01-28 NOTE — CARE PLAN
The patient is Stable - Low risk of patient condition declining or worsening    Shift Goals  Clinical Goals: safety  Patient Goals: rest  Family Goals: n/a    Progress made toward(s) clinical / shift goals:  yes    Problem: Pain - Standard  Goal: Alleviation of pain or a reduction in pain to the patient’s comfort goal  Outcome: Progressing   Assessing both 0-10 pain scale, and non-verbal pain indicators, medicating per MAR.    Problem: Knowledge Deficit - Standard  Goal: Patient and family/care givers will demonstrate understanding of plan of care, disease process/condition, diagnostic tests and medications  Outcome: Progressing     Problem: Skin Integrity  Goal: Skin integrity is maintained or improved  Outcome: Progressing  Bilateral heel mepilex in place.     Problem: Fall Risk  Goal: Patient will remain free from falls  Outcome: Progressing  Bed alarm in place, treaded socks in place, hourly rounding in place. Patient educated to call for assistance before getting up, patient verbalized understanding.     Problem: Safety - Medical Restraint  Goal: Remains free of injury from restraints (Restraint for Interference with Medical Device)  Outcome: Progressing  Goal: Free from restraint(s) (Restraint for Interference with Medical Device)  Outcome: Progressing   Restraints discontinued.

## 2022-01-28 NOTE — ASSESSMENT & PLAN NOTE
Likely secondary to vasovagal syncope as event happened after bowel movement.  Orthostatic vital sign pending.  EKG ordered, noted to have PAC  Echocardiogram reviewed

## 2022-01-29 LAB
ANION GAP SERPL CALC-SCNC: 12 MMOL/L (ref 7–16)
BASOPHILS # BLD AUTO: 0.3 % (ref 0–1.8)
BASOPHILS # BLD: 0.03 K/UL (ref 0–0.12)
BUN SERPL-MCNC: 33 MG/DL (ref 8–22)
CALCIUM SERPL-MCNC: 8 MG/DL (ref 8.5–10.5)
CHLORIDE SERPL-SCNC: 105 MMOL/L (ref 96–112)
CO2 SERPL-SCNC: 23 MMOL/L (ref 20–33)
CREAT SERPL-MCNC: 0.78 MG/DL (ref 0.5–1.4)
EOSINOPHIL # BLD AUTO: 0.05 K/UL (ref 0–0.51)
EOSINOPHIL NFR BLD: 0.5 % (ref 0–6.9)
ERYTHROCYTE [DISTWIDTH] IN BLOOD BY AUTOMATED COUNT: 44.3 FL (ref 35.9–50)
GLUCOSE SERPL-MCNC: 120 MG/DL (ref 65–99)
HCT VFR BLD AUTO: 29.4 % (ref 42–52)
HGB BLD-MCNC: 10.1 G/DL (ref 14–18)
IMM GRANULOCYTES # BLD AUTO: 0.1 K/UL (ref 0–0.11)
IMM GRANULOCYTES NFR BLD AUTO: 0.9 % (ref 0–0.9)
LACTATE BLD-SCNC: 0.9 MMOL/L (ref 0.5–2)
LYMPHOCYTES # BLD AUTO: 1.14 K/UL (ref 1–4.8)
LYMPHOCYTES NFR BLD: 10.5 % (ref 22–41)
MCH RBC QN AUTO: 35.3 PG (ref 27–33)
MCHC RBC AUTO-ENTMCNC: 34.4 G/DL (ref 33.7–35.3)
MCV RBC AUTO: 102.8 FL (ref 81.4–97.8)
MONOCYTES # BLD AUTO: 0.86 K/UL (ref 0–0.85)
MONOCYTES NFR BLD AUTO: 7.9 % (ref 0–13.4)
NEUTROPHILS # BLD AUTO: 8.7 K/UL (ref 1.82–7.42)
NEUTROPHILS NFR BLD: 79.9 % (ref 44–72)
NRBC # BLD AUTO: 0 K/UL
NRBC BLD-RTO: 0 /100 WBC
PLATELET # BLD AUTO: 356 K/UL (ref 164–446)
PMV BLD AUTO: 9.2 FL (ref 9–12.9)
POTASSIUM SERPL-SCNC: 4.1 MMOL/L (ref 3.6–5.5)
RBC # BLD AUTO: 2.86 M/UL (ref 4.7–6.1)
SODIUM SERPL-SCNC: 140 MMOL/L (ref 135–145)
WBC # BLD AUTO: 10.9 K/UL (ref 4.8–10.8)

## 2022-01-29 PROCEDURE — A9270 NON-COVERED ITEM OR SERVICE: HCPCS | Performed by: STUDENT IN AN ORGANIZED HEALTH CARE EDUCATION/TRAINING PROGRAM

## 2022-01-29 PROCEDURE — 85025 COMPLETE CBC W/AUTO DIFF WBC: CPT

## 2022-01-29 PROCEDURE — 700102 HCHG RX REV CODE 250 W/ 637 OVERRIDE(OP): Performed by: STUDENT IN AN ORGANIZED HEALTH CARE EDUCATION/TRAINING PROGRAM

## 2022-01-29 PROCEDURE — 700111 HCHG RX REV CODE 636 W/ 250 OVERRIDE (IP): Performed by: ORTHOPAEDIC SURGERY

## 2022-01-29 PROCEDURE — 700102 HCHG RX REV CODE 250 W/ 637 OVERRIDE(OP): Performed by: INTERNAL MEDICINE

## 2022-01-29 PROCEDURE — 770001 HCHG ROOM/CARE - MED/SURG/GYN PRIV*

## 2022-01-29 PROCEDURE — 80048 BASIC METABOLIC PNL TOTAL CA: CPT

## 2022-01-29 PROCEDURE — A9270 NON-COVERED ITEM OR SERVICE: HCPCS | Performed by: INTERNAL MEDICINE

## 2022-01-29 PROCEDURE — 99232 SBSQ HOSP IP/OBS MODERATE 35: CPT | Performed by: INTERNAL MEDICINE

## 2022-01-29 PROCEDURE — 83605 ASSAY OF LACTIC ACID: CPT

## 2022-01-29 PROCEDURE — 99024 POSTOP FOLLOW-UP VISIT: CPT | Performed by: ORTHOPAEDIC SURGERY

## 2022-01-29 PROCEDURE — 36415 COLL VENOUS BLD VENIPUNCTURE: CPT

## 2022-01-29 RX ADMIN — FOLIC ACID 1 MG: 1 TABLET ORAL at 04:40

## 2022-01-29 RX ADMIN — CYANOCOBALAMIN TAB 500 MCG 1000 MCG: 500 TAB at 04:39

## 2022-01-29 RX ADMIN — ATORVASTATIN CALCIUM 40 MG: 40 TABLET, FILM COATED ORAL at 04:39

## 2022-01-29 RX ADMIN — QUETIAPINE FUMARATE 50 MG: 25 TABLET ORAL at 20:48

## 2022-01-29 RX ADMIN — ENOXAPARIN SODIUM 40 MG: 40 INJECTION SUBCUTANEOUS at 04:39

## 2022-01-29 ASSESSMENT — PAIN DESCRIPTION - PAIN TYPE
TYPE: ACUTE PAIN
TYPE: ACUTE PAIN;SURGICAL PAIN
TYPE: ACUTE PAIN

## 2022-01-29 ASSESSMENT — ENCOUNTER SYMPTOMS
FLANK PAIN: 0
DIZZINESS: 0
PALPITATIONS: 0
HEADACHES: 0
NERVOUS/ANXIOUS: 1
FEVER: 0
FOCAL WEAKNESS: 0
SHORTNESS OF BREATH: 0
SORE THROAT: 0
COUGH: 0

## 2022-01-29 NOTE — CARE PLAN
The patient is Watcher - Medium risk of patient condition declining or worsening    Shift Goals  Clinical Goals: safety, hemodynamic stability  Patient Goals: rest  Family Goals: n/a    Progress made toward(s) clinical / shift goals:       Problem: Pain - Standard  Goal: Alleviation of pain or a reduction in pain to the patient’s comfort goal  Outcome: Progressing  Note: Pain managed with prescribed medications and nonpharmalogical pain interventions      Problem: Knowledge Deficit - Standard  Goal: Patient and family/care givers will demonstrate understanding of plan of care, disease process/condition, diagnostic tests and medications  Outcome: Progressing  Note: All questions and concerns addressed with patient      Problem: Fall Risk  Goal: Patient will remain free from falls  Outcome: Progressing  Note: Appropriate safety interventions and fall precautions in place. Bed alarm in place for patient safety        Patient is not progressing towards the following goals:

## 2022-01-29 NOTE — PROGRESS NOTES
[facility] Medicine Daily Progress Note    Date of Service  1/29/2022    Chief Complaint  Navin Suazo is a 89 y.o. male admitted 1/19/2022 with right hip pain    Hospital Course  Navin Suazo is a 89 y.o. male who presented 1/19/2022 with fall.  PMH of dyslipidemia, CKD 3.  Patient tripped down some stairs, fell down on the last step and hit the concrete on the right side earlier this evening.  Did not has hit his head, denies LOC, most of the pain is on the right way he also has some left hip pain as well.  CT head, C-spine with no acute abnormality.  CT pelvis shows angulated and displaced transcervical right femoral neck fracture.  EDP spoke with Ortho who will evaluate the patient for surgery tomorrow.    Interval Problem Update  POD 3 s/p right hip fx repair.   BP normalized. CHAPITO resolved after IVF bolus. H/H stable.  Complaining of insomnia. Melatonin does not work for him. Trial trazodone.  Seeing distressing spider webs in the corners of the room and knows that they are not there. Provided reassurance.  Voiding well, last BM today. Delirium prevention measures.    Motivated, may be appropriate candidate for inpatient rehab. Referral pending.  His wife has parkinsons, is cared for by their daughter, who can not care for both.    1/25-he is slight agitated. Remain cooperative. He believes that his daughter is stuck in a room. He talks about PD. He hopes to talk to director from [facility]. He called his son later telling to his son that he has bugs on his room. Spoke to his daughter. This is not his baseline. He drinks alcohol 5 shots whisk a week, and a glass of wine every dinner.     1/26- he had a rough night. He was placed on restrain. Sleeping during the day. UA no signs of infection. Ammonia level normal. Cannot rule out alcohol withdrawal agitation. No neuro deficits. Continue to monitor. If again agitated tomorrow, I will order brain MRI.     1/27- doing better. A&O x4. Cooperative, not  hallucinating. No need for MRI. meds adjusted, stop trazodone and increased Seroquel to 50 mg nightly. Off sitter. Hope for SNF tomorrow.      1/28- doing better in the morning. A/O x4. Agreed with SNF. Daughter and wife present later to visit him. Rapid response was called, pt lethargic and barely responsive. Had a bowel movement, prior to that. BP 80/28. Rapid team, full work up completed. Pt felt better once he was placed back to his bed. Bolus 500 cc given. Trop/blood cultures/lactic acid to follow.   Discussed with daughter and wife about code status and both agreed with DNAR/DNI. Daughter stated that her father is frail, and would not survive resuscitation, it will just cause more pain then good.   Patient was of sound mind and voluntarily participated in the conversation.  Daughter Jessika and wife were present for the conversation.  I discussed advance care planning face to face with the patient and family for at least 12 minutes, including diagnosis, prognosis, plan of care, risks and benefits of any therapies , code status.     1/29-he is awake and alert.  Blood pressure is improved.  Complaining of loss of appetite.  All lab work from yesterday including blood cultures, lactic acid, troponin were unremarkable.  Patient medically cleared to transfer to skilled nursing      I have personally seen and examined the patient at bedside. I discussed the plan of care with patient, family, bedside RN, charge RN,  and pharmacy.    Consultants/Specialty  orthopedics    Code Status  DNAR/DNI    Disposition  Patient is medically cleared for discharge.   Anticipate discharge to to skilled nursing facility vs inpatient rehab.  I have placed the appropriate orders for post-discharge needs.    Review of Systems  Review of Systems   Constitutional: Positive for malaise/fatigue. Negative for fever.   HENT: Negative for sore throat.    Respiratory: Negative for cough and shortness of breath.    Cardiovascular:  Negative for chest pain, palpitations and leg swelling.   Genitourinary: Positive for frequency. Negative for dysuria and flank pain.   Musculoskeletal: Positive for joint pain.   Neurological: Negative for dizziness, focal weakness and headaches.   Psychiatric/Behavioral: The patient is nervous/anxious.         Physical Exam  Temp:  [36.5 °C (97.7 °F)-37.2 °C (98.9 °F)] 37.1 °C (98.7 °F)  Pulse:  [78-98] 78  Resp:  [16-19] 16  BP: (125-141)/(58-81) 134/66  SpO2:  [80 %-100 %] 98 %    Physical Exam  Vitals and nursing note reviewed.   Constitutional:       General: He is not in acute distress.     Appearance: Normal appearance.   HENT:      Head: Normocephalic and atraumatic.      Right Ear: External ear normal.      Left Ear: External ear normal.      Nose: Nose normal. No congestion.      Mouth/Throat:      Mouth: Mucous membranes are moist.      Pharynx: Oropharynx is clear.   Eyes:      General: No scleral icterus.     Conjunctiva/sclera: Conjunctivae normal.   Cardiovascular:      Rate and Rhythm: Normal rate and regular rhythm.      Heart sounds: Normal heart sounds.   Pulmonary:      Effort: No respiratory distress.      Breath sounds: Normal breath sounds. No wheezing or rales.   Abdominal:      General: Abdomen is flat. Bowel sounds are normal. There is no distension.      Tenderness: There is no abdominal tenderness.   Musculoskeletal:      Cervical back: Neck supple. No rigidity.      Right lower leg: No edema.      Left lower leg: No edema.      Comments: Right hip surgical dressing in place, c/d/i without strikethrough. Surrounding skin nonerythematous, nonedematous, without ecchymosis.   Skin:     General: Skin is warm and dry.   Neurological:      Mental Status: He is alert and oriented to person, place, and time.      Cranial Nerves: No cranial nerve deficit.   Psychiatric:         Mood and Affect: Mood normal.         Thought Content: Thought content normal.         Judgment: Judgment normal.          Fluids    Intake/Output Summary (Last 24 hours) at 1/29/2022 1557  Last data filed at 1/29/2022 1200  Gross per 24 hour   Intake 1125.83 ml   Output 700 ml   Net 425.83 ml       Laboratory  Recent Labs     01/27/22  1513 01/29/22  0435   WBC 11.4* 10.9*   RBC 3.43* 2.86*   HEMOGLOBIN 11.9* 10.1*   HEMATOCRIT 34.4* 29.4*   .3* 102.8*   MCH 34.7* 35.3*   MCHC 34.6 34.4   RDW 43.8 44.3   PLATELETCT 278 356   MPV 9.7 9.2     Recent Labs     01/27/22  1513 01/29/22  0435   SODIUM 139 140   POTASSIUM 3.8 4.1   CHLORIDE 105 105   CO2 22 23   GLUCOSE 101* 120*   BUN 31* 33*   CREATININE 0.84 0.78   CALCIUM 8.2* 8.0*                   Imaging  DX-CHEST-PORTABLE (1 VIEW)   Final Result      No evidence of acute cardiopulmonary process.      DX-CHEST-LIMITED (1 VIEW)   Final Result      1.  Cardiomegaly.   2.  Slight interstitial prominence. No consolidation or pleural effusion.      DX-FEMUR-2+ RIGHT   Final Result      Mildly angulated and displaced transcervical right femoral neck fracture.      DX-PELVIS-1 OR 2 VIEWS   Final Result      Mildly displaced transcervical right femoral neck fracture.      CT-PELVIS W/O PLUS RECONS   Final Result      Mildly comminuted, angulated and displaced transcervical right femoral neck fracture.      CT-HEAD W/O   Final Result      1.  Chronic ischemic changes.   2.  No acute intracranial abnormality.         CT-CSPINE WITHOUT PLUS RECONS   Final Result      1.  No acute fracture or dislocation of the cervical spine.   2.  Mild degeneration.   3.  Severe carotid calcified plaque.           Assessment/Plan  * Closed right hip fracture, initial encounter (HCC)- (present on admission)  Assessment & Plan  Mechanical fall down some stairs prior to admission.  CT pelvis shows right femoral neck fracture  S/p right hip fx repair on 1/21. Appreciate ortho recommendations.  Pain well controlled, continue current regimen. Wean IV medications as tolerated.  Motivated, may be  appropriate candidate for inpatient rehab. Referral pending.  1/25- he is taking NSAIDs only. No narcotic. Continue to monitor    Vasovagal syncope  Assessment & Plan  1/28- rapid team present. All tests ordered. Follow up   CXR unremarkable  EKG unremarkable  Follow up lactic acid, trop (slight elevated, but no clinical significance), blood cultures negative   No pain    1/29-resolved.  At his baseline    DNR (do not resuscitate)- (present on admission)  Assessment & Plan  Discussion as per above.   1/28: Discussed with daughter and wife about code status and both agreed with DNAR/DNI. Daughter stated that her father is frail, and would not survive resuscitation, it will just cause more pain then good.   Patient was of sound mind and voluntarily participated in the conversation.  Daughter Jessika and wife were present for the conversation.  I discussed advance care planning face to face with the patient and family for at least 12 minutes, including diagnosis, prognosis, plan of care, risks and benefits of any therapies , code status.         Encephalopathy- (present on admission)  Assessment & Plan  1/25- this is not his baseline. Talked to his daughter. + visual hallucinations   UA to rule out UTI. Not symptomatic   Cannot rule out alcoholic withdrawal ??  No neuro deficits   Also hospital delirium is on differential.   Otherwise pt has no history of dementia per his daughter and he function well with the help of his daughter at home prior to this   Will consider brain MRI if not improving in couple of days   1/26- about the same, slight better per rehab physician. Continue to monitor. No signs of UTI, ammonia level normal. Continue to monitor.  1/27- close to his baseline. No further testing. Medically cleared for rehab   1/28- at his baseline. Continue to monitor.   1/29-resolved.  At his baseline    CHAPITO (acute kidney injury) (HCC)  Assessment & Plan  Developed postoperatively in conjunction with mild hypotension  and acute blood loss anemia. Suspect hypovolemia.  Resolved after LR bolus.   Monitor.    Acute blood loss anemia  Assessment & Plan  Hgb dropped from 14.6 to 12.7 postoperatively.  Iron studies reveal low iron, TIBC. Supplement iron.  Monitor.  1/28- continue to monitor   1/29-hemoglobin stable.    CAD (coronary artery disease), native coronary artery- (present on admission)  Assessment & Plan  From chart review patient appears of had CAD with a stent placed in the past.  Unclear when this was placed however greater than 1 year ago.  Continue home atorvastatin.  Continue home ASA on discharge.    Elevated MCV- (present on admission)  Assessment & Plan  .9.  B12 and folate ordered  TSH wnl.    Stage 3a chronic kidney disease (HCC)- (present on admission)  Assessment & Plan  History of CKD, eGFR ~60  Avoid nephrotoxic agents    Mixed hyperlipidemia- (present on admission)  Assessment & Plan  Continue statin    Insomnia- (present on admission)  Assessment & Plan  May contribute to delirium.   seroquel increased to 50 mg.   Trazodone and melatonin increase risk for [place] per PMR       VTE prophylaxis: pharmacologic prophylaxis contraindicated due to Pending surgery    I have performed a physical exam and reviewed and updated ROS and Plan today (1/29/2022). In review of yesterday's note (1/28/2022), there are no changes except as documented above.    Interventions to be considered in all patients in order to minimize the risk of delirium.   -do not disturb patient (vitals or lab draws) between the hours of 10 PM and 6 AM.  -ideally the patient should not sleep during the day and we should avoid day time naps.   -up in chair for meals  -ambulate at least three times daily, as able  -watch for constipation  -timed voiding - ask patient is she would like to go to the bathroom q 2-3 hours, except during the do not disturb hours.   -remove all necessary lines (central lines, peripheral IVs, feeding tubes, leslie  catheters)  -unless patient has shown harm to self or others I would recommend against use of restraints - either chemical or physical (antipsychotics)   -minimize polypharmacy, do not dose medication during sleep hours

## 2022-01-29 NOTE — CARE PLAN
The patient is Watcher - Medium risk of patient condition declining or worsening    Shift Goals  Clinical Goals: safety, hemodynamic stability  Patient Goals: rest  Family Goals: n/a    Progress made toward(s) clinical / shift goals:  yes    Problem: Pain - Standard  Goal: Alleviation of pain or a reduction in pain to the patient’s comfort goal  1/28/2022 2311 by Vivian Basurto R.N.  Outcome: Progressing  1/28/2022 2310 by Vivian Basurto R.N.  Outcome: Progressing   Using 0-10 and non-verbal descriptors to assess for pain.     Problem: Knowledge Deficit - Standard  Goal: Patient and family/care givers will demonstrate understanding of plan of care, disease process/condition, diagnostic tests and medications  1/28/2022 2311 by Vivian Basurto R.N.  Outcome: Progressing  1/28/2022 2310 by Vivian Basurto R.N.  Outcome: Progressing     Problem: Skin Integrity  Goal: Skin integrity is maintained or improved  1/28/2022 2311 by Vivian Basurto R.N.  Outcome: Progressing  1/28/2022 2310 by Vivian Basurto R.N.  Outcome: Progressing   Bi lateral heel mepilex in place.    Problem: Fall Risk  Goal: Patient will remain free from falls  1/28/2022 2311 by Vivian Basurto R.N.  Outcome: Progressing  1/28/2022 2310 by Vivian Basurto R.N.  Outcome: Progressing   Treaded socks in place, bed alarm in place, hourly rounding in place. Patient educated to call for assistance before getting up, patient verbalized understanding.    Problem: Safety - Medical Restraint  Goal: Remains free of injury from restraints (Restraint for Interference with Medical Device)  1/28/2022 2311 by Vivian Basurto R.N.  Outcome: Progressing  1/28/2022 2310 by Vivian Basurto R.N.  Outcome: Progressing  Goal: Free from restraint(s) (Restraint for Interference with Medical Device)  1/28/2022 2311 by Vivian Basurto R.N.  Outcome: Progressing  1/28/2022 2310 by Vivian Basurto R.N.  Outcome: Progressing

## 2022-01-29 NOTE — PROGRESS NOTES
"   Orthopaedic Progress Note    Interval changes:  Patient doing well  Dressing CDI  RLE dressings CDI    ROS - Patient denies any new issues but is confused.  Pain well controlled.    /66   Pulse 78   Temp 37.1 °C (98.7 °F) (Temporal)   Resp 16   Ht 1.803 m (5' 11\")   Wt 90.7 kg (200 lb)   SpO2 98%       Patient seen and examined  No acute distress  Breathing non labored  RRR  RLE dressings CDI, DNVI, moves all toes, cap refill <2 sec.     Recent Labs     01/27/22  1513 01/29/22  0435   WBC 11.4* 10.9*   RBC 3.43* 2.86*   HEMOGLOBIN 11.9* 10.1*   HEMATOCRIT 34.4* 29.4*   .3* 102.8*   MCH 34.7* 35.3*   MCHC 34.6 34.4   RDW 43.8 44.3   PLATELETCT 278 356   MPV 9.7 9.2       Active Hospital Problems    Diagnosis    • DNR (do not resuscitate) [Z66]    • Vasovagal syncope [R55]    • Encephalopathy [G93.40]    • Acute blood loss anemia [D62]    • CHAPITO (acute kidney injury) ([facility]) [N17.9]    • Stage 3a chronic kidney disease ([facility]) [N18.31]    • Elevated MCV [R71.8]    • CAD (coronary artery disease), native coronary artery [I25.10]    • Closed right hip fracture, initial encounter ([facility]) [S72.001A]    • Mixed hyperlipidemia [E78.2]    • Insomnia [G47.00]        Assessment/Plan:  Patient doing well  Dressing CDI  RLE dressings CDI  POD#7 S/P Open treatment of right femoral fracture, proximal end, neck with prosthetic replacement  Wt bearing status - WBAT  Wound care/Drains - Dressings to be changed every other day by nursing  Future Procedures - none planned   Lovenox: Start 1/22, Duration-until ambulatory > 150'  Sutures/Staples out- 14 days post operatively  PT/OT-initiated  Antibiotics:  Perioperative completed  DVT Prophylaxis- TEDS/SCDs/Foot pumps  Raya-none  Case Coordination for Discharge Planning - Disposition SNF     I have performed a physical exam and reviewed and updated ROS and Plan today (1/28). In review of yesterday's note (1/27), there are no changes except as documented above.   "

## 2022-01-29 NOTE — THERAPY
Physical Therapy   Daily Treatment     Patient Name: Navin Suazo  Age:  89 y.o., Sex:  male  Medical Record #: [id number]  Today's Date: 1/28/2022     Precautions  Precautions: (P) Fall Risk;Posterior Hip Precautions;Weight Bearing As Tolerated Right Lower Extremity  Comments: (P) [place]    Assessment    Pt awake/alert eating ice cream upon entry into the room. Pt's dtr and wife BS. Pt agreeable to PT this afternoon. Pt making improvements w/his functional mobility from last PT session and he is more lucid. Pt could recite 3/3 of his hip precautions. Pt needing min assist to get seated EOB and to come to stand from both EOB and raised toilet->FWW. Pt amb to the BR 2* BM incont for pericare. Pt tolerated 3 sit<->stands from toilet, pt never c/o dizziness. Pt c/o fatigue and noticeable change in his pallor. Pt w/syncope event on the toilet, nrsg in to assist w/vitals and to get pt transferred to w/c->bed. Rapid team called. Pt has had poor po intake. Pt states food makes him choke but he managed to eat his drumstick w/no choking or gagging.     Plan    Continue current treatment plan.    DC Equipment Recommendations: (P) Unable to determine at this time  Discharge Recommendations: (P) Recommend post-acute placement for additional physical therapy services prior to discharge home     Objective       01/28/22 1505   Other Treatments   Other Treatments Provided Pt w/syncopal event on the toilet. Pt dependent transfer to the w/c and to the bed. See documented vitals.    Balance   Sitting Balance (Static) Fair +   Sitting Balance (Dynamic) Fair   Standing Balance (Static) Poor +   Standing Balance (Dynamic) Poor   Weight Shift Sitting Fair   Weight Shift Standing Fair   Comments standing w/FWW   Gait Analysis   Gait Level Of Assist Minimal Assist   Assistive Device Front Wheel Walker   Distance (Feet) 12   # of Times Distance was Traveled 1   Weight Bearing Status WBAT Rt LE// Post Prec   Comments Pt amb to the bathroom  for cleanup 2* incont of BM. No c/o dizziness w/his efforts to the bathroom.    Bed Mobility    Supine to Sit Minimal Assist  (HOB partially elevated and use of railing)   Sit to Supine Total Assist   Scooting Contact Guard Assist  (seated)   Rolling Minimal Assist to Rt.  (w/railing)   Skilled Intervention Verbal Cuing   Comments Pt could recite 3/3 of his post precautions.    Functional Mobility   Sit to Stand Minimal Assist  (from EOB->FWW)   Bed, Chair, Wheelchair Transfer Total Assist  (w/c->bed w/syncope event)   Toilet Transfers Minimal Assist  (w/FWW)   Skilled Intervention Verbal Cuing   Comments Pt tolerated 3 sit<->stands from the toilet for pericare. Pt gradually c/o fatigue, never feeling of dizziness. Elevated LE's while on toilet to assist w/his BP, waiting for nrsg assist, to bring in vitals machine. When nrsg finally arrived, pt's BP 80/ 20 and syncope.    How much difficulty does the patient currently have...   Turning over in bed (including adjusting bedclothes, sheets and blankets)? 2   Sitting down on and standing up from a chair with arms (e.g., wheelchair, bedside commode, etc.) 1   Moving from lying on back to sitting on the side of the bed? 1   How much help from another person does the patient currently need...   Moving to and from a bed to a chair (including a wheelchair)? 3   Need to walk in a hospital room? 2   Climbing 3-5 steps with a railing? 1   6 clicks Mobility Score 10   Short Term Goals    Short Term Goal # 1 Pt will perform supine<>sit from flat HOB/no railing with supervision within 6 visits to ensure independent mobility at home.    Goal Outcome # 1 goal not met   Short Term Goal # 2 Pt will perform sit<>stand with FWW and supervision within 6 visits to progress to independence.    Goal Outcome # 2 Goal not met   Short Term Goal # 3 Pt will ambulate x 150ft with FWW and supervision within 6 visits to ensure independent mobility at home.    Goal Outcome # 3 Goal not met   Short  Term Goal # 4 Pt will ascend/descend 8 stairs with unilateral support and CGA within 6 visits to enter/exit home.    Goal Outcome # 4 Goal not met

## 2022-01-29 NOTE — PROGRESS NOTES
Received report from day shift RNWendy. Assumed care of patient at 1900.     2023 Assessment complete. Patient denies pain at this time. Patient resting comfortably in bed, all needs met at this time. Bed alarm in place, treaded socks in place. Patient educated to call for assistance before getting up, patient verbalized understanding. Emptied urinal 200mL.    2250 Lab at bedside.    2345 Rounded with rapid RNCarlos. Patient requesting pain medication, medicated per MAR.    0154 Patient resting comfortably in bed. Emptied urinal 200mL.

## 2022-01-29 NOTE — PROGRESS NOTES
Pt A&Ox4  Pain managed with prescribed medications  Tolerating diet, denies n/v. + bowel sounds, +flatus, LBM 1/28  +void  Saturating >90% on 2L O2 via oxymask (previously on 5L)   Denies SOB   Pt ambulates with x2 assistance   Bed alarm in place for patient safety   Updated on plan of care. Safety education provided. Bed locked in low. Call light within reach. Rounding in place.

## 2022-01-29 NOTE — CARE PLAN
The patient is Watcher - Medium risk of patient condition declining or worsening    Shift Goals  Clinical Goals: safety, hemodynamic stability  Patient Goals: rest  Family Goals: n/a    Progress made toward(s) clinical / shift goals:  yes

## 2022-01-30 ENCOUNTER — APPOINTMENT (OUTPATIENT)
Dept: RADIOLOGY | Facility: MEDICAL CENTER | Age: 87
DRG: 521 | End: 2022-01-30
Attending: INTERNAL MEDICINE
Payer: MEDICARE

## 2022-01-30 PROBLEM — R10.13 EPIGASTRIC PAIN: Status: ACTIVE | Noted: 2022-01-30

## 2022-01-30 LAB
ANION GAP SERPL CALC-SCNC: 13 MMOL/L (ref 7–16)
APTT PPP: 34.8 SEC (ref 24.7–36)
BASOPHILS # BLD AUTO: 0.5 % (ref 0–1.8)
BASOPHILS # BLD: 0.06 K/UL (ref 0–0.12)
BUN SERPL-MCNC: 27 MG/DL (ref 8–22)
CALCIUM SERPL-MCNC: 8.2 MG/DL (ref 8.5–10.5)
CHLORIDE SERPL-SCNC: 105 MMOL/L (ref 96–112)
CO2 SERPL-SCNC: 18 MMOL/L (ref 20–33)
CREAT SERPL-MCNC: 0.75 MG/DL (ref 0.5–1.4)
EKG IMPRESSION: NORMAL
EOSINOPHIL # BLD AUTO: 0.04 K/UL (ref 0–0.51)
EOSINOPHIL NFR BLD: 0.3 % (ref 0–6.9)
ERYTHROCYTE [DISTWIDTH] IN BLOOD BY AUTOMATED COUNT: 44.4 FL (ref 35.9–50)
ERYTHROCYTE [DISTWIDTH] IN BLOOD BY AUTOMATED COUNT: 44.9 FL (ref 35.9–50)
GLUCOSE BLD-MCNC: 160 MG/DL (ref 65–99)
GLUCOSE SERPL-MCNC: 122 MG/DL (ref 65–99)
HCT VFR BLD AUTO: 25.6 % (ref 42–52)
HCT VFR BLD AUTO: 26.9 % (ref 42–52)
HCT VFR BLD AUTO: 27.9 % (ref 42–52)
HGB BLD-MCNC: 8.7 G/DL (ref 14–18)
HGB BLD-MCNC: 9 G/DL (ref 14–18)
HGB BLD-MCNC: 9.4 G/DL (ref 14–18)
IMM GRANULOCYTES # BLD AUTO: 0.09 K/UL (ref 0–0.11)
IMM GRANULOCYTES NFR BLD AUTO: 0.7 % (ref 0–0.9)
INR PPP: 1.31 (ref 0.87–1.13)
LYMPHOCYTES # BLD AUTO: 1.25 K/UL (ref 1–4.8)
LYMPHOCYTES NFR BLD: 9.8 % (ref 22–41)
MCH RBC QN AUTO: 34.4 PG (ref 27–33)
MCH RBC QN AUTO: 34.8 PG (ref 27–33)
MCHC RBC AUTO-ENTMCNC: 33.5 G/DL (ref 33.7–35.3)
MCHC RBC AUTO-ENTMCNC: 33.7 G/DL (ref 33.7–35.3)
MCV RBC AUTO: 102.7 FL (ref 81.4–97.8)
MCV RBC AUTO: 103.3 FL (ref 81.4–97.8)
MONOCYTES # BLD AUTO: 0.88 K/UL (ref 0–0.85)
MONOCYTES NFR BLD AUTO: 6.9 % (ref 0–13.4)
NEUTROPHILS # BLD AUTO: 10.38 K/UL (ref 1.82–7.42)
NEUTROPHILS NFR BLD: 81.8 % (ref 44–72)
NRBC # BLD AUTO: 0 K/UL
NRBC BLD-RTO: 0 /100 WBC
PLATELET # BLD AUTO: 379 K/UL (ref 164–446)
PLATELET # BLD AUTO: 387 K/UL (ref 164–446)
PMV BLD AUTO: 9 FL (ref 9–12.9)
PMV BLD AUTO: 9.8 FL (ref 9–12.9)
POTASSIUM SERPL-SCNC: 3.9 MMOL/L (ref 3.6–5.5)
PROTHROMBIN TIME: 15.9 SEC (ref 12–14.6)
RBC # BLD AUTO: 2.62 M/UL (ref 4.7–6.1)
RBC # BLD AUTO: 2.7 M/UL (ref 4.7–6.1)
SODIUM SERPL-SCNC: 136 MMOL/L (ref 135–145)
WBC # BLD AUTO: 12.7 K/UL (ref 4.8–10.8)
WBC # BLD AUTO: 14.4 K/UL (ref 4.8–10.8)

## 2022-01-30 PROCEDURE — 93005 ELECTROCARDIOGRAM TRACING: CPT | Performed by: INTERNAL MEDICINE

## 2022-01-30 PROCEDURE — 85027 COMPLETE CBC AUTOMATED: CPT

## 2022-01-30 PROCEDURE — 85014 HEMATOCRIT: CPT

## 2022-01-30 PROCEDURE — 99221 1ST HOSP IP/OBS SF/LOW 40: CPT | Performed by: SURGERY

## 2022-01-30 PROCEDURE — 700102 HCHG RX REV CODE 250 W/ 637 OVERRIDE(OP): Performed by: INTERNAL MEDICINE

## 2022-01-30 PROCEDURE — 93010 ELECTROCARDIOGRAM REPORT: CPT | Performed by: INTERNAL MEDICINE

## 2022-01-30 PROCEDURE — 82962 GLUCOSE BLOOD TEST: CPT

## 2022-01-30 PROCEDURE — 74177 CT ABD & PELVIS W/CONTRAST: CPT | Mod: ME

## 2022-01-30 PROCEDURE — 700105 HCHG RX REV CODE 258: Performed by: INTERNAL MEDICINE

## 2022-01-30 PROCEDURE — 85610 PROTHROMBIN TIME: CPT

## 2022-01-30 PROCEDURE — 85018 HEMOGLOBIN: CPT

## 2022-01-30 PROCEDURE — 700102 HCHG RX REV CODE 250 W/ 637 OVERRIDE(OP): Performed by: STUDENT IN AN ORGANIZED HEALTH CARE EDUCATION/TRAINING PROGRAM

## 2022-01-30 PROCEDURE — 700102 HCHG RX REV CODE 250 W/ 637 OVERRIDE(OP): Performed by: ORTHOPAEDIC SURGERY

## 2022-01-30 PROCEDURE — 770001 HCHG ROOM/CARE - MED/SURG/GYN PRIV*

## 2022-01-30 PROCEDURE — 85730 THROMBOPLASTIN TIME PARTIAL: CPT

## 2022-01-30 PROCEDURE — A9270 NON-COVERED ITEM OR SERVICE: HCPCS | Performed by: STUDENT IN AN ORGANIZED HEALTH CARE EDUCATION/TRAINING PROGRAM

## 2022-01-30 PROCEDURE — 80048 BASIC METABOLIC PNL TOTAL CA: CPT

## 2022-01-30 PROCEDURE — A9270 NON-COVERED ITEM OR SERVICE: HCPCS | Performed by: INTERNAL MEDICINE

## 2022-01-30 PROCEDURE — 85025 COMPLETE CBC W/AUTO DIFF WBC: CPT

## 2022-01-30 PROCEDURE — 99233 SBSQ HOSP IP/OBS HIGH 50: CPT | Performed by: INTERNAL MEDICINE

## 2022-01-30 PROCEDURE — 700101 HCHG RX REV CODE 250: Performed by: PHYSICAL MEDICINE & REHABILITATION

## 2022-01-30 PROCEDURE — A9270 NON-COVERED ITEM OR SERVICE: HCPCS | Performed by: ORTHOPAEDIC SURGERY

## 2022-01-30 PROCEDURE — 700111 HCHG RX REV CODE 636 W/ 250 OVERRIDE (IP): Performed by: INTERNAL MEDICINE

## 2022-01-30 PROCEDURE — C9113 INJ PANTOPRAZOLE SODIUM, VIA: HCPCS | Performed by: INTERNAL MEDICINE

## 2022-01-30 PROCEDURE — 700117 HCHG RX CONTRAST REV CODE 255: Performed by: INTERNAL MEDICINE

## 2022-01-30 PROCEDURE — 700111 HCHG RX REV CODE 636 W/ 250 OVERRIDE (IP): Performed by: ORTHOPAEDIC SURGERY

## 2022-01-30 PROCEDURE — 36415 COLL VENOUS BLD VENIPUNCTURE: CPT

## 2022-01-30 RX ORDER — SODIUM CHLORIDE, SODIUM LACTATE, POTASSIUM CHLORIDE, AND CALCIUM CHLORIDE .6; .31; .03; .02 G/100ML; G/100ML; G/100ML; G/100ML
500 INJECTION, SOLUTION INTRAVENOUS ONCE
Status: COMPLETED | OUTPATIENT
Start: 2022-01-30 | End: 2022-01-30

## 2022-01-30 RX ORDER — PANTOPRAZOLE SODIUM 40 MG/10ML
40 INJECTION, POWDER, LYOPHILIZED, FOR SOLUTION INTRAVENOUS 2 TIMES DAILY
Status: DISCONTINUED | OUTPATIENT
Start: 2022-01-30 | End: 2022-02-02 | Stop reason: HOSPADM

## 2022-01-30 RX ORDER — OMEPRAZOLE 20 MG/1
20 CAPSULE, DELAYED RELEASE ORAL 2 TIMES DAILY
Status: DISCONTINUED | OUTPATIENT
Start: 2022-01-30 | End: 2022-01-30

## 2022-01-30 RX ORDER — OMEPRAZOLE 20 MG/1
20 CAPSULE, DELAYED RELEASE ORAL DAILY
Status: DISCONTINUED | OUTPATIENT
Start: 2022-01-30 | End: 2022-01-30

## 2022-01-30 RX ORDER — CALCIUM CARBONATE 500 MG/1
500 TABLET, CHEWABLE ORAL DAILY
Status: DISCONTINUED | OUTPATIENT
Start: 2022-01-30 | End: 2022-02-02 | Stop reason: HOSPADM

## 2022-01-30 RX ADMIN — OMEPRAZOLE 20 MG: 20 CAPSULE, DELAYED RELEASE ORAL at 17:21

## 2022-01-30 RX ADMIN — LIDOCAINE 1 PATCH: 50 PATCH TOPICAL at 11:08

## 2022-01-30 RX ADMIN — IOHEXOL 100 ML: 350 INJECTION, SOLUTION INTRAVENOUS at 13:21

## 2022-01-30 RX ADMIN — CYANOCOBALAMIN TAB 500 MCG 1000 MCG: 500 TAB at 04:26

## 2022-01-30 RX ADMIN — PANTOPRAZOLE SODIUM 40 MG: 40 INJECTION, POWDER, FOR SOLUTION INTRAVENOUS at 19:59

## 2022-01-30 RX ADMIN — ATORVASTATIN CALCIUM 40 MG: 40 TABLET, FILM COATED ORAL at 04:26

## 2022-01-30 RX ADMIN — QUETIAPINE FUMARATE 50 MG: 25 TABLET ORAL at 19:58

## 2022-01-30 RX ADMIN — DOCUSATE SODIUM 100 MG: 100 CAPSULE, LIQUID FILLED ORAL at 17:21

## 2022-01-30 RX ADMIN — OMEPRAZOLE 20 MG: 20 CAPSULE, DELAYED RELEASE ORAL at 11:06

## 2022-01-30 RX ADMIN — CALCIUM CARBONATE 500 MG: 500 TABLET, CHEWABLE ORAL at 11:06

## 2022-01-30 RX ADMIN — SODIUM CHLORIDE, POTASSIUM CHLORIDE, SODIUM LACTATE AND CALCIUM CHLORIDE: 600; 310; 30; 20 INJECTION, SOLUTION INTRAVENOUS at 15:21

## 2022-01-30 RX ADMIN — SODIUM CHLORIDE, POTASSIUM CHLORIDE, SODIUM LACTATE AND CALCIUM CHLORIDE: 600; 310; 30; 20 INJECTION, SOLUTION INTRAVENOUS at 22:54

## 2022-01-30 RX ADMIN — DOCUSATE SODIUM 100 MG: 100 CAPSULE, LIQUID FILLED ORAL at 04:26

## 2022-01-30 RX ADMIN — FOLIC ACID 1 MG: 1 TABLET ORAL at 04:26

## 2022-01-30 RX ADMIN — SODIUM CHLORIDE, POTASSIUM CHLORIDE, SODIUM LACTATE AND CALCIUM CHLORIDE: 600; 310; 30; 20 INJECTION, SOLUTION INTRAVENOUS at 19:26

## 2022-01-30 RX ADMIN — ENOXAPARIN SODIUM 40 MG: 40 INJECTION SUBCUTANEOUS at 04:26

## 2022-01-30 RX ADMIN — FERROUS SULFATE TAB 325 MG (65 MG ELEMENTAL FE) 325 MG: 325 (65 FE) TAB at 09:06

## 2022-01-30 ASSESSMENT — ENCOUNTER SYMPTOMS
CONSTIPATION: 0
COUGH: 0
DIZZINESS: 0
SORE THROAT: 0
FOCAL WEAKNESS: 0
VOMITING: 1
NAUSEA: 1
FEVER: 0
SHORTNESS OF BREATH: 0
PALPITATIONS: 0
ABDOMINAL PAIN: 1
DIARRHEA: 0
NERVOUS/ANXIOUS: 1
BLOOD IN STOOL: 0
FLANK PAIN: 0
HEADACHES: 0

## 2022-01-30 ASSESSMENT — PAIN DESCRIPTION - PAIN TYPE
TYPE: ACUTE PAIN
TYPE: ACUTE PAIN

## 2022-01-30 NOTE — PROGRESS NOTES
"   Orthopaedic Progress Note    Interval changes:  Patient doing well  Dressing CDI  RLE dressings CDI    ROS - Patient denies any new issues but is confused.  Pain well controlled.    /66   Pulse 78   Temp 37.1 °C (98.7 °F) (Temporal)   Resp 16   Ht 1.803 m (5' 11\")   Wt 90.7 kg (200 lb)   SpO2 98%       Patient seen and examined  No acute distress  Breathing non labored  RRR  RLE dressings CDI, DNVI, moves all toes, cap refill <2 sec.     Recent Labs     01/27/22  1513 01/29/22  0435   WBC 11.4* 10.9*   RBC 3.43* 2.86*   HEMOGLOBIN 11.9* 10.1*   HEMATOCRIT 34.4* 29.4*   .3* 102.8*   MCH 34.7* 35.3*   MCHC 34.6 34.4   RDW 43.8 44.3   PLATELETCT 278 356   MPV 9.7 9.2       Active Hospital Problems    Diagnosis    • DNR (do not resuscitate) [Z66]    • Vasovagal syncope [R55]    • Encephalopathy [G93.40]    • Acute blood loss anemia [D62]    • CHAPITO (acute kidney injury) ([facility]) [N17.9]    • Stage 3a chronic kidney disease ([facility]) [N18.31]    • Elevated MCV [R71.8]    • CAD (coronary artery disease), native coronary artery [I25.10]    • Closed right hip fracture, initial encounter ([facility]) [S72.001A]    • Mixed hyperlipidemia [E78.2]    • Insomnia [G47.00]        Assessment/Plan:  Patient doing well  Dressing CDI  RLE dressings CDI  POD#8 S/P Open treatment of right femoral fracture, proximal end, neck with prosthetic replacement  Wt bearing status - WBAT  Wound care/Drains - Dressings to be changed every other day by nursing  Future Procedures - none planned   Lovenox: Start 1/22, Duration-until ambulatory > 150'  Sutures/Staples out- 14 days post operatively  PT/OT-initiated  Antibiotics:  Perioperative completed  DVT Prophylaxis- TEDS/SCDs/Foot pumps  Raya-none  Case Coordination for Discharge Planning - Disposition SNF     I have performed a physical exam and reviewed and updated ROS and Plan today (1/29). In review of yesterday's note (1/28), there are no changes except as documented above.   "

## 2022-01-30 NOTE — PROGRESS NOTES
[facility] Medicine Daily Progress Note    Date of Service  1/30/2022    Chief Complaint  Navin Suazo is a 89 y.o. male admitted 1/19/2022 with right hip pain    Hospital Course  Navin Suazo is a 89 y.o. male who presented 1/19/2022 with fall.  PMH of dyslipidemia, CKD 3.  Patient tripped down some stairs, fell down on the last step and hit the concrete on the right side earlier this evening.  Did not has hit his head, denies LOC, most of the pain is on the right way he also has some left hip pain as well.  CT head, C-spine with no acute abnormality.  CT pelvis shows angulated and displaced transcervical right femoral neck fracture.  EDP spoke with Ortho who will evaluate the patient for surgery tomorrow.    Interval Problem Update  POD 3 s/p right hip fx repair.   BP normalized. CHAPITO resolved after IVF bolus. H/H stable.  Complaining of insomnia. Melatonin does not work for him. Trial trazodone.  Seeing distressing spider webs in the corners of the room and knows that they are not there. Provided reassurance.  Voiding well, last BM today. Delirium prevention measures.    Motivated, may be appropriate candidate for inpatient rehab. Referral pending.  His wife has parkinsons, is cared for by their daughter, who can not care for both.    1/25-he is slight agitated. Remain cooperative. He believes that his daughter is stuck in a room. He talks about PD. He hopes to talk to director from [facility]. He called his son later telling to his son that he has bugs on his room. Spoke to his daughter. This is not his baseline. He drinks alcohol 5 shots whisk a week, and a glass of wine every dinner.     1/26- he had a rough night. He was placed on restrain. Sleeping during the day. UA no signs of infection. Ammonia level normal. Cannot rule out alcohol withdrawal agitation. No neuro deficits. Continue to monitor. If again agitated tomorrow, I will order brain MRI.     1/27- doing better. A&O x4. Cooperative, not  hallucinating. No need for MRI. meds adjusted, stop trazodone and increased Seroquel to 50 mg nightly. Off sitter. Hope for SNF tomorrow.      1/28- doing better in the morning. A/O x4. Agreed with SNF. Daughter and wife present later to visit him. Rapid response was called, pt lethargic and barely responsive. Had a bowel movement, prior to that. BP 80/28. Rapid team, full work up completed. Pt felt better once he was placed back to his bed. Bolus 500 cc given. Trop/blood cultures/lactic acid to follow.   Discussed with daughter and wife about code status and both agreed with DNAR/DNI. Daughter stated that her father is frail, and would not survive resuscitation, it will just cause more pain then good.   Patient was of sound mind and voluntarily participated in the conversation.  Daughter Jessika and wife were present for the conversation.  I discussed advance care planning face to face with the patient and family for at least 12 minutes, including diagnosis, prognosis, plan of care, risks and benefits of any therapies , code status.     1/29-he is awake and alert.  Blood pressure is improved.  Complaining of loss of appetite.  All lab work from yesterday including blood cultures, lactic acid, troponin were unremarkable.  Patient medically cleared to transfer to skilled nursing    1/30- he complains of nausea and vomiting. He tells me that he cannot hold anything, and cannot eat. Some tenderness on upper abdomen. CT scan done possible cholecystitis. Discuss with Dr. Aguiar. Will start with HIDA scan for possible cholecystitis. If that positive will call [facility] again tomorrow. If negative will call GI for possible EGD .      I have personally seen and examined the patient at bedside. I discussed the plan of care with patient, family, bedside RN, charge RN,  and pharmacy.    Consultants/Specialty  orthopedics    Code Status  DNAR/DNI    Disposition  Patient is medically cleared for discharge.    Anticipate discharge to to skilled nursing facility vs inpatient rehab.  I have placed the appropriate orders for post-discharge needs.    Review of Systems  Review of Systems   Constitutional: Positive for malaise/fatigue. Negative for fever.   HENT: Negative for sore throat.    Respiratory: Negative for cough and shortness of breath.    Cardiovascular: Negative for chest pain, palpitations and leg swelling.   Gastrointestinal: Positive for abdominal pain, nausea and vomiting. Negative for blood in stool, constipation and diarrhea.   Genitourinary: Positive for frequency. Negative for dysuria and flank pain.   Musculoskeletal: Positive for joint pain.   Neurological: Negative for dizziness, focal weakness and headaches.   Psychiatric/Behavioral: The patient is nervous/anxious.         Physical Exam  Temp:  [36.2 °C (97.2 °F)-37.3 °C (99.1 °F)] 36.7 °C (98.1 °F)  Pulse:  [63-96] 96  Resp:  [18] 18  BP: (114-148)/(59-81) 114/81  SpO2:  [96 %-98 %] 96 %    Physical Exam  Vitals and nursing note reviewed.   Constitutional:       General: He is not in acute distress.     Appearance: Normal appearance.   HENT:      Head: Normocephalic and atraumatic.      Right Ear: External ear normal.      Left Ear: External ear normal.      Nose: Nose normal. No congestion.      Mouth/Throat:      Mouth: Mucous membranes are moist.      Pharynx: Oropharynx is clear.   Eyes:      General: No scleral icterus.     Conjunctiva/sclera: Conjunctivae normal.   Cardiovascular:      Rate and Rhythm: Normal rate and regular rhythm.      Heart sounds: Normal heart sounds.   Pulmonary:      Effort: No respiratory distress.      Breath sounds: Normal breath sounds. No wheezing or rales.   Abdominal:      General: Abdomen is flat. Bowel sounds are normal. There is no distension.      Tenderness: There is abdominal tenderness. There is no guarding.   Musculoskeletal:      Cervical back: Neck supple. No rigidity.      Right lower leg: No edema.       Left lower leg: No edema.      Comments: Right hip surgical dressing in place, c/d/i without strikethrough. Surrounding skin nonerythematous, nonedematous, without ecchymosis.   Skin:     General: Skin is warm and dry.   Neurological:      Mental Status: He is alert and oriented to person, place, and time.      Cranial Nerves: No cranial nerve deficit.   Psychiatric:         Mood and Affect: Mood normal.         Thought Content: Thought content normal.         Judgment: Judgment normal.         Fluids  No intake or output data in the 24 hours ending 01/30/22 1610    Laboratory  Recent Labs     01/29/22  0435 01/30/22  1159   WBC 10.9* 14.4*   RBC 2.86* 2.70*   HEMOGLOBIN 10.1* 9.4*   HEMATOCRIT 29.4* 27.9*   .8* 103.3*   MCH 35.3* 34.8*   MCHC 34.4 33.7   RDW 44.3 44.9   PLATELETCT 356 387   MPV 9.2 9.8     Recent Labs     01/29/22  0435 01/30/22  1159   SODIUM 140 136   POTASSIUM 4.1 3.9   CHLORIDE 105 105   CO2 23 18*   GLUCOSE 120* 122*   BUN 33* 27*   CREATININE 0.78 0.75   CALCIUM 8.0* 8.2*                   Imaging  CT-ABDOMEN-PELVIS WITH   Final Result      1.  Wall thickening of the duodenal bulb and second portion of the duodenum with surrounding inflammation can be seen in duodenitis/peptic ulcer disease.   2.  Gallbladder is distended with pericholecystic fluid. This can be seen in the setting of cholecystitis.   3.  Atherosclerotic plaque within an ectatic aorta.   4.  Colonic diverticulosis.   5.  2.1 x 2.5 cm fluid collection overlying the right hip arthroplasty is likely a postsurgical evolving hematoma/seroma.   6.  Small hiatal hernia.   7.  Small right pleural effusion and bibasilar atelectasis.   8.  Cardiomegaly.   9.  Linear hypodensity in the inferior spleen could represent a small area of splenic infarction.            DX-CHEST-PORTABLE (1 VIEW)   Final Result      No evidence of acute cardiopulmonary process.      DX-CHEST-LIMITED (1 VIEW)   Final Result      1.  Cardiomegaly.   2.   Slight interstitial prominence. No consolidation or pleural effusion.      DX-FEMUR-2+ RIGHT   Final Result      Mildly angulated and displaced transcervical right femoral neck fracture.      DX-PELVIS-1 OR 2 VIEWS   Final Result      Mildly displaced transcervical right femoral neck fracture.      CT-PELVIS W/O PLUS RECONS   Final Result      Mildly comminuted, angulated and displaced transcervical right femoral neck fracture.      CT-HEAD W/O   Final Result      1.  Chronic ischemic changes.   2.  No acute intracranial abnormality.         CT-CSPINE WITHOUT PLUS RECONS   Final Result      1.  No acute fracture or dislocation of the cervical spine.   2.  Mild degeneration.   3.  Severe carotid calcified plaque.      NM-BILIARY (HIDA) SCAN WITH CCK    (Results Pending)        Assessment/Plan  * Closed right hip fracture, initial encounter (HCC)- (present on admission)  Assessment & Plan  Mechanical fall down some stairs prior to admission.  CT pelvis shows right femoral neck fracture  S/p right hip fx repair on 1/21. Appreciate ortho recommendations.  Pain well controlled, continue current regimen. Wean IV medications as tolerated.  Motivated, may be appropriate candidate for inpatient rehab. Referral pending.  1/25- he is taking NSAIDs only. No narcotic. Continue to monitor    Epigastric pain- (present on admission)  Assessment & Plan  WBC is going up. 1/30- N/vomiting. CT scan cannot rule out ulcer vs cholecystitis. Discuss with general surgery Dr. Aguiar. HIDA scan to follow to determine if pt has cholecystitis. If that is negative, will call GI for possible EGD    Vasovagal syncope  Assessment & Plan  1/28- rapid team present. All tests ordered. Follow up   CXR unremarkable  EKG unremarkable  Follow up lactic acid, trop (slight elevated, but no clinical significance), blood cultures negative   No pain    1/29-resolved.  At his baseline    DNR (do not resuscitate)- (present on admission)  Assessment &  Plan  Discussion as per above.   1/28: Discussed with daughter and wife about code status and both agreed with DNAR/DNI. Daughter stated that her father is frail, and would not survive resuscitation, it will just cause more pain then good.   Patient was of sound mind and voluntarily participated in the conversation.  Daughter Jessika and wife were present for the conversation.  I discussed advance care planning face to face with the patient and family for at least 12 minutes, including diagnosis, prognosis, plan of care, risks and benefits of any therapies , code status.         Encephalopathy- (present on admission)  Assessment & Plan  1/25- this is not his baseline. Talked to his daughter. + visual hallucinations   UA to rule out UTI. Not symptomatic   Cannot rule out alcoholic withdrawal ??  No neuro deficits   Also hospital delirium is on differential.   Otherwise pt has no history of dementia per his daughter and he function well with the help of his daughter at home prior to this   Will consider brain MRI if not improving in couple of days   1/26- about the same, slight better per rehab physician. Continue to monitor. No signs of UTI, ammonia level normal. Continue to monitor.  1/27- close to his baseline. No further testing. Medically cleared for rehab   1/28- at his baseline. Continue to monitor.   1/29-resolved.  At his baseline  1/30- at his baseline. Will continue seroquel for now     CHAPITO (acute kidney injury) (HCC)  Assessment & Plan  Developed postoperatively in conjunction with mild hypotension and acute blood loss anemia. Suspect hypovolemia.  Resolved after LR bolus.   Monitor.  1/30- at his baseline     Acute blood loss anemia  Assessment & Plan  Hgb dropped from 14.6 to 12.7 postoperatively.  Iron studies reveal low iron, TIBC. Supplement iron.  Monitor.  1/28- continue to monitor   1/29-hemoglobin stable.  1/30- slight lower again. CT scan possible peptic ulcer. Empiric treatment with omeprazole 20  mg BID    CAD (coronary artery disease), native coronary artery- (present on admission)  Assessment & Plan  From chart review patient appears of had CAD with a stent placed in the past.  Unclear when this was placed however greater than 1 year ago.  Continue home atorvastatin.  Continue home ASA on discharge.    Elevated MCV- (present on admission)  Assessment & Plan  .9.  B12 and folate ordered  TSH wnl.  Due to alcohol use    Stage 3a chronic kidney disease (HCC)- (present on admission)  Assessment & Plan  History of CKD, eGFR ~60  Avoid nephrotoxic agents  1/30- back to his baseline    Mixed hyperlipidemia- (present on admission)  Assessment & Plan  Continue statin    Insomnia- (present on admission)  Assessment & Plan  May contribute to delirium.   seroquel increased to 50 mg.   Trazodone and melatonin increase risk for Sun down per PMR  1/30- still sometimes he states that he has hallucination. However behaviors is stable. At his baseline. Continue for now with current regime        VTE prophylaxis: pharmacologic prophylaxis contraindicated due to Pending surgery    I have performed a physical exam and reviewed and updated ROS and Plan today (1/30/2022). In review of yesterday's note (1/29/2022), there are no changes except as documented above.    Interventions to be considered in all patients in order to minimize the risk of delirium.   -do not disturb patient (vitals or lab draws) between the hours of 10 PM and 6 AM.  -ideally the patient should not sleep during the day and we should avoid day time naps.   -up in chair for meals  -ambulate at least three times daily, as able  -watch for constipation  -timed voiding - ask patient is she would like to go to the bathroom q 2-3 hours, except during the do not disturb hours.   -remove all necessary lines (central lines, peripheral IVs, feeding tubes, leslie catheters)  -unless patient has shown harm to self or others I would recommend against use of  restraints - either chemical or physical (antipsychotics)   -minimize polypharmacy, do not dose medication during sleep hours

## 2022-01-30 NOTE — CARE PLAN
The patient is Stable - Low risk of patient condition declining or worsening    Shift Goals  Clinical Goals: Wean O2  Patient Goals: comfort  Family Goals: N/A    Progress made toward(s) clinical / shift goals:    Problem: Pain - Standard  Goal: Alleviation of pain or a reduction in pain to the patient’s comfort goal  Outcome: Progressing     Problem: Fall Risk  Goal: Patient will remain free from falls  Outcome: Progressing   Call light & belongings in reach, bed in low position and locked, front wheel walker out of sight, siderails up x 2, pt wearing non-slip footwear, adequate lighting, clutter free environment, bed alarm on. Educated on level of fall risk, oriented to use of call light and encouraged pt to call before attempting to get out of bed.

## 2022-01-30 NOTE — CARE PLAN
The patient is Stable - Low risk of patient condition declining or worsening    Shift Goals  Clinical Goals: Safety, wean O2  Patient Goals: Comfort  Family Goals: N/A    Progress made toward(s) clinical / shift goals:    Problem: Pain - Standard  Goal: Alleviation of pain or a reduction in pain to the patient’s comfort goal  Outcome: Progressing  Note:    Administering pain mediations as ordered (see MAR). Providing patient with cold packs, and repositioning as needed.         Problem: Fall Risk  Goal: Patient will remain free from falls  Outcome: Progressing  Note: Call light & belongings in reach, bed in low position and locked, front wheel walker out of sight, siderails up x 2, pt wearing non-slip footwear, adequate lighting, clutter free environment, bed alarm on. Educated on level of fall risk, oriented to use of call light and encouraged pt to call before attempting to get out of bed.         Patient is not progressing towards the following goals:

## 2022-01-30 NOTE — DISCHARGE PLANNING
Anticipated Discharge Disposition: SNF-[facility]    Action: Per chart review pt is medically clear for SNF. Evie is pending insurance auth. LSW made phone call to [facility] for update. LSW was notified that no one works in intake over the weekend.    Barriers to Discharge: SNF insurance auth and bed.    Plan: Care coordination will follow up with [facility] to verify insurance auth and for bed.

## 2022-01-30 NOTE — PROGRESS NOTES
Received report from Night RN. Patient stated having hallucinations. Continues on nightly seroquel.

## 2022-01-31 ENCOUNTER — ANESTHESIA EVENT (OUTPATIENT)
Dept: SURGERY | Facility: MEDICAL CENTER | Age: 87
DRG: 521 | End: 2022-01-31
Payer: MEDICARE

## 2022-01-31 ENCOUNTER — APPOINTMENT (OUTPATIENT)
Dept: RADIOLOGY | Facility: MEDICAL CENTER | Age: 87
DRG: 521 | End: 2022-01-31
Attending: INTERNAL MEDICINE
Payer: MEDICARE

## 2022-01-31 ENCOUNTER — ANESTHESIA (OUTPATIENT)
Dept: SURGERY | Facility: MEDICAL CENTER | Age: 87
DRG: 521 | End: 2022-01-31
Payer: MEDICARE

## 2022-01-31 PROBLEM — R93.2 ABNORMAL CT SCAN, GALLBLADDER: Status: ACTIVE | Noted: 2022-01-31

## 2022-01-31 LAB
ALBUMIN SERPL BCP-MCNC: 2.9 G/DL (ref 3.2–4.9)
ALBUMIN/GLOB SERPL: 1.1 G/DL
ALP SERPL-CCNC: 127 U/L (ref 30–99)
ALT SERPL-CCNC: 24 U/L (ref 2–50)
ANION GAP SERPL CALC-SCNC: 10 MMOL/L (ref 7–16)
AST SERPL-CCNC: 20 U/L (ref 12–45)
BASOPHILS # BLD AUTO: 0.3 % (ref 0–1.8)
BASOPHILS # BLD: 0.03 K/UL (ref 0–0.12)
BILIRUB SERPL-MCNC: 0.8 MG/DL (ref 0.1–1.5)
BUN SERPL-MCNC: 21 MG/DL (ref 8–22)
CALCIUM SERPL-MCNC: 8.2 MG/DL (ref 8.5–10.5)
CHLORIDE SERPL-SCNC: 105 MMOL/L (ref 96–112)
CO2 SERPL-SCNC: 22 MMOL/L (ref 20–33)
CREAT SERPL-MCNC: 0.81 MG/DL (ref 0.5–1.4)
EOSINOPHIL # BLD AUTO: 0.06 K/UL (ref 0–0.51)
EOSINOPHIL NFR BLD: 0.5 % (ref 0–6.9)
ERYTHROCYTE [DISTWIDTH] IN BLOOD BY AUTOMATED COUNT: 43.8 FL (ref 35.9–50)
EXTERNAL QUALITY CONTROL: NORMAL
GLOBULIN SER CALC-MCNC: 2.6 G/DL (ref 1.9–3.5)
GLUCOSE SERPL-MCNC: 109 MG/DL (ref 65–99)
HCT VFR BLD AUTO: 24.4 % (ref 42–52)
HCT VFR BLD AUTO: 25.1 % (ref 42–52)
HCT VFR BLD AUTO: 25.8 % (ref 42–52)
HCT VFR BLD AUTO: 26 % (ref 42–52)
HCT VFR BLD AUTO: 26.3 % (ref 42–52)
HGB BLD-MCNC: 8.3 G/DL (ref 14–18)
HGB BLD-MCNC: 8.6 G/DL (ref 14–18)
HGB BLD-MCNC: 8.9 G/DL (ref 14–18)
IMM GRANULOCYTES # BLD AUTO: 0.14 K/UL (ref 0–0.11)
IMM GRANULOCYTES NFR BLD AUTO: 1.2 % (ref 0–0.9)
LYMPHOCYTES # BLD AUTO: 1.21 K/UL (ref 1–4.8)
LYMPHOCYTES NFR BLD: 10.2 % (ref 22–41)
MCH RBC QN AUTO: 35.1 PG (ref 27–33)
MCHC RBC AUTO-ENTMCNC: 34.3 G/DL (ref 33.7–35.3)
MCV RBC AUTO: 102.4 FL (ref 81.4–97.8)
MONOCYTES # BLD AUTO: 0.91 K/UL (ref 0–0.85)
MONOCYTES NFR BLD AUTO: 7.6 % (ref 0–13.4)
NEUTROPHILS # BLD AUTO: 9.55 K/UL (ref 1.82–7.42)
NEUTROPHILS NFR BLD: 80.2 % (ref 44–72)
NRBC # BLD AUTO: 0 K/UL
NRBC BLD-RTO: 0 /100 WBC
PLATELET # BLD AUTO: 381 K/UL (ref 164–446)
PMV BLD AUTO: 9.3 FL (ref 9–12.9)
POTASSIUM SERPL-SCNC: 3.6 MMOL/L (ref 3.6–5.5)
PROT SERPL-MCNC: 5.5 G/DL (ref 6–8.2)
RBC # BLD AUTO: 2.45 M/UL (ref 4.7–6.1)
SARS-COV+SARS-COV-2 AG RESP QL IA.RAPID: NEGATIVE
SODIUM SERPL-SCNC: 137 MMOL/L (ref 135–145)
WBC # BLD AUTO: 11.9 K/UL (ref 4.8–10.8)

## 2022-01-31 PROCEDURE — 700111 HCHG RX REV CODE 636 W/ 250 OVERRIDE (IP): Performed by: ANESTHESIOLOGY

## 2022-01-31 PROCEDURE — 700102 HCHG RX REV CODE 250 W/ 637 OVERRIDE(OP): Performed by: INTERNAL MEDICINE

## 2022-01-31 PROCEDURE — 700101 HCHG RX REV CODE 250: Performed by: ANESTHESIOLOGY

## 2022-01-31 PROCEDURE — 160009 HCHG ANES TIME/MIN: Performed by: INTERNAL MEDICINE

## 2022-01-31 PROCEDURE — 700105 HCHG RX REV CODE 258: Performed by: INTERNAL MEDICINE

## 2022-01-31 PROCEDURE — 85018 HEMOGLOBIN: CPT | Mod: 91

## 2022-01-31 PROCEDURE — 36415 COLL VENOUS BLD VENIPUNCTURE: CPT

## 2022-01-31 PROCEDURE — A9270 NON-COVERED ITEM OR SERVICE: HCPCS | Performed by: INTERNAL MEDICINE

## 2022-01-31 PROCEDURE — 99232 SBSQ HOSP IP/OBS MODERATE 35: CPT | Performed by: SURGERY

## 2022-01-31 PROCEDURE — 99233 SBSQ HOSP IP/OBS HIGH 50: CPT | Performed by: INTERNAL MEDICINE

## 2022-01-31 PROCEDURE — 80053 COMPREHEN METABOLIC PANEL: CPT

## 2022-01-31 PROCEDURE — 99024 POSTOP FOLLOW-UP VISIT: CPT | Performed by: ORTHOPAEDIC SURGERY

## 2022-01-31 PROCEDURE — 700102 HCHG RX REV CODE 250 W/ 637 OVERRIDE(OP): Performed by: STUDENT IN AN ORGANIZED HEALTH CARE EDUCATION/TRAINING PROGRAM

## 2022-01-31 PROCEDURE — A9270 NON-COVERED ITEM OR SERVICE: HCPCS | Performed by: STUDENT IN AN ORGANIZED HEALTH CARE EDUCATION/TRAINING PROGRAM

## 2022-01-31 PROCEDURE — 0DJ08ZZ INSPECTION OF UPPER INTESTINAL TRACT, VIA NATURAL OR ARTIFICIAL OPENING ENDOSCOPIC: ICD-10-PCS | Performed by: INTERNAL MEDICINE

## 2022-01-31 PROCEDURE — A9270 NON-COVERED ITEM OR SERVICE: HCPCS | Performed by: PHYSICAL MEDICINE & REHABILITATION

## 2022-01-31 PROCEDURE — A9270 NON-COVERED ITEM OR SERVICE: HCPCS | Performed by: ORTHOPAEDIC SURGERY

## 2022-01-31 PROCEDURE — 160202 HCHG ENDO MINUTES - 1ST 30 MINS LEVEL 3: Performed by: INTERNAL MEDICINE

## 2022-01-31 PROCEDURE — 700102 HCHG RX REV CODE 250 W/ 637 OVERRIDE(OP): Performed by: PHYSICAL MEDICINE & REHABILITATION

## 2022-01-31 PROCEDURE — C9113 INJ PANTOPRAZOLE SODIUM, VIA: HCPCS | Performed by: INTERNAL MEDICINE

## 2022-01-31 PROCEDURE — 160002 HCHG RECOVERY MINUTES (STAT): Performed by: INTERNAL MEDICINE

## 2022-01-31 PROCEDURE — 770001 HCHG ROOM/CARE - MED/SURG/GYN PRIV*

## 2022-01-31 PROCEDURE — 700111 HCHG RX REV CODE 636 W/ 250 OVERRIDE (IP): Performed by: INTERNAL MEDICINE

## 2022-01-31 PROCEDURE — 99232 SBSQ HOSP IP/OBS MODERATE 35: CPT | Performed by: PHYSICAL MEDICINE & REHABILITATION

## 2022-01-31 PROCEDURE — 700111 HCHG RX REV CODE 636 W/ 250 OVERRIDE (IP)

## 2022-01-31 PROCEDURE — 85025 COMPLETE CBC W/AUTO DIFF WBC: CPT

## 2022-01-31 PROCEDURE — 160048 HCHG OR STATISTICAL LEVEL 1-5: Performed by: INTERNAL MEDICINE

## 2022-01-31 PROCEDURE — 160035 HCHG PACU - 1ST 60 MINS PHASE I: Performed by: INTERNAL MEDICINE

## 2022-01-31 PROCEDURE — 700102 HCHG RX REV CODE 250 W/ 637 OVERRIDE(OP): Performed by: ORTHOPAEDIC SURGERY

## 2022-01-31 PROCEDURE — 85014 HEMATOCRIT: CPT

## 2022-01-31 PROCEDURE — 78226 HEPATOBILIARY SYSTEM IMAGING: CPT | Mod: MF

## 2022-01-31 PROCEDURE — A9537 TC99M MEBROFENIN: HCPCS

## 2022-01-31 PROCEDURE — 87426 SARSCOV CORONAVIRUS AG IA: CPT | Performed by: INTERNAL MEDICINE

## 2022-01-31 RX ORDER — HYDROMORPHONE HYDROCHLORIDE 1 MG/ML
0.2 INJECTION, SOLUTION INTRAMUSCULAR; INTRAVENOUS; SUBCUTANEOUS
Status: DISCONTINUED | OUTPATIENT
Start: 2022-01-31 | End: 2022-01-31 | Stop reason: HOSPADM

## 2022-01-31 RX ORDER — OXYCODONE HCL 5 MG/5 ML
5 SOLUTION, ORAL ORAL
Status: DISCONTINUED | OUTPATIENT
Start: 2022-01-31 | End: 2022-01-31 | Stop reason: HOSPADM

## 2022-01-31 RX ORDER — MORPHINE SULFATE 4 MG/ML
3 INJECTION INTRAVENOUS ONCE
Status: COMPLETED | OUTPATIENT
Start: 2022-01-31 | End: 2022-01-31

## 2022-01-31 RX ORDER — HYDROMORPHONE HYDROCHLORIDE 1 MG/ML
1 INJECTION, SOLUTION INTRAMUSCULAR; INTRAVENOUS; SUBCUTANEOUS
Status: DISCONTINUED | OUTPATIENT
Start: 2022-01-31 | End: 2022-01-31 | Stop reason: HOSPADM

## 2022-01-31 RX ORDER — MORPHINE SULFATE 4 MG/ML
INJECTION INTRAVENOUS
Status: COMPLETED
Start: 2022-01-31 | End: 2022-01-31

## 2022-01-31 RX ORDER — SODIUM CHLORIDE, SODIUM GLUCONATE, SODIUM ACETATE, POTASSIUM CHLORIDE AND MAGNESIUM CHLORIDE 526; 502; 368; 37; 30 MG/100ML; MG/100ML; MG/100ML; MG/100ML; MG/100ML
500 INJECTION, SOLUTION INTRAVENOUS CONTINUOUS
Status: DISCONTINUED | OUTPATIENT
Start: 2022-01-31 | End: 2022-01-31 | Stop reason: HOSPADM

## 2022-01-31 RX ORDER — QUETIAPINE FUMARATE 25 MG/1
75 TABLET, FILM COATED ORAL NIGHTLY
Status: DISCONTINUED | OUTPATIENT
Start: 2022-01-31 | End: 2022-01-31

## 2022-01-31 RX ORDER — IPRATROPIUM BROMIDE AND ALBUTEROL SULFATE 2.5; .5 MG/3ML; MG/3ML
3 SOLUTION RESPIRATORY (INHALATION)
Status: DISCONTINUED | OUTPATIENT
Start: 2022-01-31 | End: 2022-01-31 | Stop reason: HOSPADM

## 2022-01-31 RX ORDER — MIDAZOLAM HYDROCHLORIDE 1 MG/ML
1 INJECTION INTRAMUSCULAR; INTRAVENOUS
Status: DISCONTINUED | OUTPATIENT
Start: 2022-01-31 | End: 2022-01-31 | Stop reason: HOSPADM

## 2022-01-31 RX ORDER — PHENYLEPHRINE HCL IN 0.9% NACL 0.5 MG/5ML
SYRINGE (ML) INTRAVENOUS PRN
Status: DISCONTINUED | OUTPATIENT
Start: 2022-01-31 | End: 2022-01-31 | Stop reason: SURG

## 2022-01-31 RX ORDER — LIDOCAINE HYDROCHLORIDE 20 MG/ML
INJECTION, SOLUTION EPIDURAL; INFILTRATION; INTRACAUDAL; PERINEURAL PRN
Status: DISCONTINUED | OUTPATIENT
Start: 2022-01-31 | End: 2022-01-31 | Stop reason: SURG

## 2022-01-31 RX ORDER — MEPERIDINE HYDROCHLORIDE 25 MG/ML
12.5 INJECTION INTRAMUSCULAR; INTRAVENOUS; SUBCUTANEOUS
Status: DISCONTINUED | OUTPATIENT
Start: 2022-01-31 | End: 2022-01-31 | Stop reason: HOSPADM

## 2022-01-31 RX ORDER — DIPHENHYDRAMINE HYDROCHLORIDE 50 MG/ML
12.5 INJECTION INTRAMUSCULAR; INTRAVENOUS
Status: DISCONTINUED | OUTPATIENT
Start: 2022-01-31 | End: 2022-01-31 | Stop reason: HOSPADM

## 2022-01-31 RX ORDER — HALOPERIDOL 5 MG/ML
1 INJECTION INTRAMUSCULAR
Status: DISCONTINUED | OUTPATIENT
Start: 2022-01-31 | End: 2022-01-31 | Stop reason: HOSPADM

## 2022-01-31 RX ORDER — HYDROMORPHONE HYDROCHLORIDE 1 MG/ML
0.4 INJECTION, SOLUTION INTRAMUSCULAR; INTRAVENOUS; SUBCUTANEOUS
Status: DISCONTINUED | OUTPATIENT
Start: 2022-01-31 | End: 2022-01-31 | Stop reason: HOSPADM

## 2022-01-31 RX ORDER — ONDANSETRON 2 MG/ML
4 INJECTION INTRAMUSCULAR; INTRAVENOUS
Status: DISCONTINUED | OUTPATIENT
Start: 2022-01-31 | End: 2022-01-31 | Stop reason: HOSPADM

## 2022-01-31 RX ORDER — QUETIAPINE FUMARATE 25 MG/1
75 TABLET, FILM COATED ORAL NIGHTLY
Status: DISCONTINUED | OUTPATIENT
Start: 2022-01-31 | End: 2022-02-02 | Stop reason: HOSPADM

## 2022-01-31 RX ORDER — OXYCODONE HCL 5 MG/5 ML
10 SOLUTION, ORAL ORAL
Status: DISCONTINUED | OUTPATIENT
Start: 2022-01-31 | End: 2022-01-31 | Stop reason: HOSPADM

## 2022-01-31 RX ADMIN — MORPHINE SULFATE 3 MG: 4 INJECTION INTRAVENOUS at 09:30

## 2022-01-31 RX ADMIN — PANTOPRAZOLE SODIUM 40 MG: 40 INJECTION, POWDER, FOR SOLUTION INTRAVENOUS at 04:47

## 2022-01-31 RX ADMIN — SODIUM CHLORIDE, POTASSIUM CHLORIDE, SODIUM LACTATE AND CALCIUM CHLORIDE: 600; 310; 30; 20 INJECTION, SOLUTION INTRAVENOUS at 13:23

## 2022-01-31 RX ADMIN — PANTOPRAZOLE SODIUM 40 MG: 40 INJECTION, POWDER, FOR SOLUTION INTRAVENOUS at 16:16

## 2022-01-31 RX ADMIN — PROPOFOL 50 MG: 10 INJECTION, EMULSION INTRAVENOUS at 10:53

## 2022-01-31 RX ADMIN — ATORVASTATIN CALCIUM 40 MG: 40 TABLET, FILM COATED ORAL at 04:47

## 2022-01-31 RX ADMIN — CYANOCOBALAMIN TAB 500 MCG 1000 MCG: 500 TAB at 04:47

## 2022-01-31 RX ADMIN — LIDOCAINE HYDROCHLORIDE 50 MG: 20 INJECTION, SOLUTION EPIDURAL; INFILTRATION; INTRACAUDAL at 10:53

## 2022-01-31 RX ADMIN — QUETIAPINE FUMARATE 75 MG: 25 TABLET ORAL at 19:16

## 2022-01-31 RX ADMIN — Medication 100 MCG: at 10:53

## 2022-01-31 RX ADMIN — CALCIUM CARBONATE 500 MG: 500 TABLET, CHEWABLE ORAL at 04:47

## 2022-01-31 RX ADMIN — DOCUSATE SODIUM 50 MG AND SENNOSIDES 8.6 MG 1 TABLET: 8.6; 5 TABLET, FILM COATED ORAL at 20:48

## 2022-01-31 RX ADMIN — FOLIC ACID 1 MG: 1 TABLET ORAL at 04:47

## 2022-01-31 ASSESSMENT — PAIN DESCRIPTION - PAIN TYPE
TYPE: ACUTE PAIN

## 2022-01-31 ASSESSMENT — ENCOUNTER SYMPTOMS
DIZZINESS: 0
FOCAL WEAKNESS: 0
COUGH: 0
NAUSEA: 0
SORE THROAT: 0
BLOOD IN STOOL: 0
ABDOMINAL PAIN: 1
PALPITATIONS: 0
NERVOUS/ANXIOUS: 1
VOMITING: 0
FLANK PAIN: 0
DIARRHEA: 0
FEVER: 0
HEADACHES: 0
CONSTIPATION: 0
SHORTNESS OF BREATH: 0

## 2022-01-31 ASSESSMENT — PAIN SCALES - GENERAL: PAIN_LEVEL: 1

## 2022-01-31 NOTE — OP REPORT
DATE OF SERVICE:  01/31/2022     GASTROENTEROLOGY PROCEDURE NOTE     PROCEDURE PERFORMED:  Esophagogastroduodenoscopy.     PREOPERATIVE DIAGNOSES:  Gastrointestinal bleed, melena.     POSTOPERATIVE DIAGNOSIS:  Three large duodenal ulcers, all clean based, all   without stigmata for high risk bleed.     PHYSICIAN:  Ti Foster MD     ANESTHESIOLOGIST:  ____     MEDICATION:  Deep sedation.     INDICATIONS:  Procedure, risks and benefits were reviewed thoroughly with the   patient.  Risks including but not limited to bleeding, perforation, side   effects of medication were informed.  The patient voiced understanding and   agreed to proceed.     DESCRIPTION OF PROCEDURE:  The patient was placed in left lateral decubitus   position.  After sedation was achieved, bite block was inserted in the mouth   and a forward viewing gastroscope was passed carefully and easily under direct   visualization in the esophagus.  All esophageal views were normal as were   retroflex views of the stomach and forward views of the stomach.  Upon   examination of the pyloric valve, immediately in the duodenal bulb at the   duodenal sweep, there was a 1.5 cm clean based ulcer without active bleeding,   without stigmata for high risk bleed and there was some 2 additional ulcers in   the duodenal second portion measuring less than 1 cm and both low risk.  No   evidence for any new or old blood.  The stomach was suctioned, desufflated and   scope was removed.     COMPLICATIONS:  None.     ESTIMATED BLOOD LOSS:  None.     SPECIMENS:  None.     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  Continue proton pump inhibitor therapy,   omeprazole or equivalent 40 mg p.o. b.i.d. The patient may be started on a   diabetic diet and may be discharged to home from a GI perspective.  Please   call with any further questions or concerns.        ______________________________  Ti Foster  MD THOMAS/ALLI/DONI    DD:  01/31/2022 11:07  DT:  01/31/2022 11:32    Job#:  [id number]

## 2022-01-31 NOTE — ANESTHESIA POSTPROCEDURE EVALUATION
Patient: Navin Suazo    Procedure Summary     Date: 01/31/22 Room / Location: [facility] ROOM 26 / SURGERY SAME DAY [facility]    Anesthesia Start: 1048 Anesthesia Stop: 1103    Procedure: GASTROSCOPY (N/A Esophagus) Diagnosis: (DUODENAL UCLER)    Surgeons: Ti Foster M.D. Responsible Provider: Marino Quezada III, M.D.    Anesthesia Type: general ASA Status: 3          Final Anesthesia Type: general  Last vitals  BP   Blood Pressure : 111/64    Temp   36.8 °C (98.2 °F)    Pulse   69   Resp   14    SpO2   99 %      Anesthesia Post Evaluation    Patient location during evaluation: PACU  Patient participation: complete - patient participated  Level of consciousness: awake and alert  Pain score: 1    Airway patency: patent  Anesthetic complications: no  Cardiovascular status: hemodynamically stable  Respiratory status: acceptable  Hydration status: euvolemic    PONV: none          No complications documented.     Nurse Pain Score: 1 (NPRS)

## 2022-01-31 NOTE — OR NURSING
1125: Report from Terri KIM. Patient is wide awake. Denies pain or nausea at this time. Patient's respiration spontaneous and non-labored.     1149: Patient met criteria to transfer out of PACU.

## 2022-01-31 NOTE — RESPIRATORY CARE
Respiratory Rapid Response Note    Symptoms Black Stool ; Near Syncopal episode       Breath Sounds  RUL Breath Sounds: Clear (01/30/22 1825)  RML Breath Sounds: Clear (01/30/22 1825)  RLL Breath Sounds: Diminished (01/30/22 1825)  KARINA Breath Sounds: Clear (01/30/22 1825)  LLL Breath Sounds: Diminished (01/30/22 1825)                   O2 (LPM): 6 (01/30/22 1825)       Events/Summary/Plan: Rapid Response for GI Bleed/Syncope (01/30/22 1825)    Respiratory release by Rapid RN / Hospitalist.

## 2022-01-31 NOTE — CONSULTS
"    CHIEF COMPLAINT: Nausea abnormal imaging    HISTORY OF PRESENT ILLNESS:  Navin Suazo is a very pleasant 89-year-old male.  He is receiving care in the medicine service after repair of hip fracture.  He reports ongoing nausea since hospitalization.  He states is not been able to tolerate diet.  He states no abdominal pain .  He received further evaluation medicine service the CT scan as below.  Findings concerning for ulcer duodenum, distended gallbladder.  Medicine service requested a HIDA scan for further evaluation and consulted general surgery.      PAST MEDICAL HISTORY:  has a past medical history of Erectile dysfunction.    PAST SURGICAL HISTORY:  has a past surgical history that includes hernia repair; vasectomy; and pr partial hip replacement (Right, 1/21/2022).    ALLERGIES: No Known Allergies    CURRENT MEDICATIONS:    Home Medications     Reviewed by Miles Barbosa (Pharmacy Tech) on 01/20/22 at [employer]  Med List Status: Complete   Medication Last Dose Status   aspirin (ASA) 81 MG Chew Tab chewable tablet 1/19/2022 Active   atorvastatin (LIPITOR) 40 MG Tab 1/20/2022 Active   Glucosamine HCl (GLUCOSAMINE PO) 1/20/2022 Active   ibuprofen (MOTRIN) 200 MG Tab 1/19/2022 Active   Melatonin 10 MG TABLET DISPERSIBLE 1/19/2022 Active   therapeutic multivitamin-minerals (THERAGRAN-M) Tab 1/20/2022 Active                FAMILY HISTORY: family history includes Other in his father and mother.    SOCIAL HISTORY:  reports that he has never smoked. He has never used smokeless tobacco. He reports current alcohol use.    REVIEW OF SYSTEMS: Positive for hallucinations, negative for loss of consciousness positive for fatigue positive for postoperative pain negative for abdominal pain positive for nausea vomiting  Review otherwise negative  PHYSICAL EXAMINATION:      Constitutional:     Vital Signs: /61   Pulse 87   Temp 36.9 °C (98.5 °F)   Resp 16   Ht 1.803 m (5' 11\")   Wt 90.7 kg (200 lb)   SpO2 " 98%    General Appearance: Awake alert appropriate in good spirits  Respiratory:   Breathing with ease no cough no distress  Cardiovascular:   Regular rate skin warm brisk capillary refill  Abdomen:   soft nontender nondistended   Neurologic:    Alert & oriented    LABORATORY VALUES:   Recent Labs     01/29/22  0435 01/30/22  1159 01/30/22  1849   WBC 10.9* 14.4* 12.7*   RBC 2.86* 2.70* 2.62*   HEMOGLOBIN 10.1* 9.4* 9.0*   HEMATOCRIT 29.4* 27.9* 26.9*   .8* 103.3* 102.7*   MCH 35.3* 34.8* 34.4*   MCHC 34.4 33.7 33.5*   RDW 44.3 44.9 44.4   PLATELETCT 356 387 379   MPV 9.2 9.8 9.0     Recent Labs     01/29/22  0435 01/30/22  1159   SODIUM 140 136   POTASSIUM 4.1 3.9   CHLORIDE 105 105   CO2 23 18*   GLUCOSE 120* 122*   BUN 33* 27*   CREATININE 0.78 0.75   CALCIUM 8.0* 8.2*                IMAGING:   CT-ABDOMEN-PELVIS WITH   Final Result      1.  Wall thickening of the duodenal bulb and second portion of the duodenum with surrounding inflammation can be seen in duodenitis/peptic ulcer disease.   2.  Gallbladder is distended with pericholecystic fluid. This can be seen in the setting of cholecystitis.   3.  Atherosclerotic plaque within an ectatic aorta.   4.  Colonic diverticulosis.   5.  2.1 x 2.5 cm fluid collection overlying the right hip arthroplasty is likely a postsurgical evolving hematoma/seroma.   6.  Small hiatal hernia.   7.  Small right pleural effusion and bibasilar atelectasis.   8.  Cardiomegaly.   9.  Linear hypodensity in the inferior spleen could represent a small area of splenic infarction.            DX-CHEST-PORTABLE (1 VIEW)   Final Result      No evidence of acute cardiopulmonary process.      DX-CHEST-LIMITED (1 VIEW)   Final Result      1.  Cardiomegaly.   2.  Slight interstitial prominence. No consolidation or pleural effusion.      DX-FEMUR-2+ RIGHT   Final Result      Mildly angulated and displaced transcervical right femoral neck fracture.      DX-PELVIS-1 OR 2 VIEWS   Final Result       Mildly displaced transcervical right femoral neck fracture.      CT-PELVIS W/O PLUS RECONS   Final Result      Mildly comminuted, angulated and displaced transcervical right femoral neck fracture.      CT-HEAD W/O   Final Result      1.  Chronic ischemic changes.   2.  No acute intracranial abnormality.         CT-CSPINE WITHOUT PLUS RECONS   Final Result      1.  No acute fracture or dislocation of the cervical spine.   2.  Mild degeneration.   3.  Severe carotid calcified plaque.      NM-BILIARY (HIDA) SCAN WITH CCK    (Results Pending)       ASSESSMENT AND PLAN:     Closed right hip fracture, initial encounter ([facility])  Mechanical fall down some stairs prior to admission.  CT pelvis shows right femoral neck fracture  S/p right hip fx repair on 1/21. Appreciate ortho recommendations.  Pain well controlled, continue current regimen. Wean IV medications as tolerated.  Motivated, may be appropriate candidate for inpatient rehab. Referral pending.  1/25- he is taking NSAIDs only. No narcotic. Continue to monitor    Mixed hyperlipidemia  Continue statin    Insomnia  May contribute to delirium.   seroquel increased to 50 mg.   Trazodone and melatonin increase risk for Sun down per PMR  1/30- still sometimes he states that he has hallucination. However behaviors is stable. At his baseline. Continue for now with current regime     Stage 3a chronic kidney disease (HCC)  History of CKD, eGFR ~60  Avoid nephrotoxic agents  1/30- back to his baseline    Elevated MCV  .9.  B12 and folate ordered  TSH wnl.  Due to alcohol use    CAD (coronary artery disease), native coronary artery  From chart review patient appears of had CAD with a stent placed in the past.  Unclear when this was placed however greater than 1 year ago.  Continue home atorvastatin.  Continue home ASA on discharge.    Acute blood loss anemia  Hgb dropped from 14.6 to 12.7 postoperatively.  Iron studies reveal low iron, TIBC. Supplement  iron.  Monitor.  1/28- continue to monitor   1/29-hemoglobin stable.  1/30- slight lower again. CT scan possible peptic ulcer. Empiric treatment with omeprazole 20 mg BID    CHAPITO (acute kidney injury) (HCC)  Developed postoperatively in conjunction with mild hypotension and acute blood loss anemia. Suspect hypovolemia.  Resolved after LR bolus.   Monitor.  1/30- at his baseline     Encephalopathy  1/25- this is not his baseline. Talked to his daughter. + visual hallucinations   UA to rule out UTI. Not symptomatic   Cannot rule out alcoholic withdrawal ??  No neuro deficits   Also hospital delirium is on differential.   Otherwise pt has no history of dementia per his daughter and he function well with the help of his daughter at home prior to this   Will consider brain MRI if not improving in couple of days   1/26- about the same, slight better per rehab physician. Continue to monitor. No signs of UTI, ammonia level normal. Continue to monitor.  1/27- close to his baseline. No further testing. Medically cleared for rehab   1/28- at his baseline. Continue to monitor.   1/29-resolved.  At his baseline  1/30- at his baseline. Will continue seroquel for now     Vasovagal syncope  1/28- rapid team present. All tests ordered. Follow up   CXR unremarkable  EKG unremarkable  Follow up lactic acid, trop (slight elevated, but no clinical significance), blood cultures negative   No pain    1/29-resolved.  At his baseline    DNR (do not resuscitate)  Discussion as per above.   1/28: Discussed with daughter and wife about code status and both agreed with DNAR/DNI. Daughter stated that her father is frail, and would not survive resuscitation, it will just cause more pain then good.   Patient was of sound mind and voluntarily participated in the conversation.  Daughter Jessika and wife were present for the conversation.  I discussed advance care planning face to face with the patient and family for at least 12 minutes, including  diagnosis, prognosis, plan of care, risks and benefits of any therapies , code status.         Epigastric pain  WBC is going up. 1/30- N/vomiting. CT scan cannot rule out ulcer vs cholecystitis. Discuss with general surgery Dr. Aguiar. HIDA scan to follow to determine if pt has cholecystitis. If that is negative, will call GI for possible EGD      CT scan as above abnormal for concern for duodenal ulcer  Question change in the gallbladder  Discussed with consulting physician concern for duodenum report already started on PPI, GI consultation  Follow-up imaging HIDA scan  Close monitoring and support    Discussed findings and plan with patient     ____________________________________     Deniz Aguiar M.D.    DD: 1/30/2022  7:17 PM    [facility] Guidelines for Acute Cholecystitis 2018  ACS NSQIP Surgical Risk Calculator

## 2022-01-31 NOTE — CODE DOCUMENTATION
Pt was assisted to the commode for BM. Pt had large tarry stool, then a syncopal episode. Pt had a pulse, a few minutes later came to. Now speaking at baseline mental status.

## 2022-01-31 NOTE — ANESTHESIA PREPROCEDURE EVALUATION
Case: [id number] Date/Time: 01/31/22 0845    Procedure: GASTROSCOPY (N/A Esophagus)    Anesthesia type: General    Pre-op diagnosis: MELENA    Location: [facility] ROOM 26 / SURGERY SAME DAY [facility]    Surgeons: Ti Foster M.D.        Pt in hospital for hip fx, s/p hemiarthroplasty 1/21/22. Pt had syncopal episode and melena yesterday. H/H stable.     Relevant Problems   CARDIAC   (positive) CAD (coronary artery disease), native coronary artery         (positive) CHAPITO (acute kidney injury) (HCC)   (positive) Stage 3a chronic kidney disease (HCC)      Other   (positive) Closed right hip fracture, initial encounter ([facility])   (positive) DNR (do not resuscitate)   (positive) Encephalopathy   (positive) Vasovagal syncope       Physical Exam    Airway   Mallampati: II  TM distance: >3 FB  Neck ROM: full       Cardiovascular - normal exam  Rhythm: regular  Rate: normal  (-) murmur     Dental - normal exam           Pulmonary - normal exam  Breath sounds clear to auscultation     Abdominal    Neurological - normal exam                 Anesthesia Plan    ASA 3   ASA physical status 3 criteria: other (comment)    Plan - general       Airway plan will be natural airway          Induction: intravenous      Pertinent diagnostic labs and testing reviewed    Informed Consent:    Anesthetic plan and risks discussed with patient.    Use of blood products discussed with: patient whom consented to blood products.

## 2022-01-31 NOTE — PROGRESS NOTES
Pt back on unit. Reoriented to room and to call when assistance is needed. Bed alarm on. Call light and belongings within reach.

## 2022-01-31 NOTE — CONSULTS
Date of Service:1/30/22    Consult Requested By: Jocelin Merino M.D.    Reason for Consultation: melena    History of Present Illness:   Navin Suazo is a 89 y.o.male admitted 1/19/2022.  Very nice gentleman who was initially admitted after a fall when he tripped down some stairs and hit the concrete on his right side.  He apparently did not hit his head did not lose consciousness however apparently a fractured or displaced his transcervical right femoral neck with a fracture.  They proceeded with a right hip hemiarthroplasty on the 20th.  Eventually resulted in prosthetic replacement.  For the last 10 days he has been participating in rehab and discharge coordination for possible for going home with some sort of assistance or supervision.  While he has been here has been receiving pain meds anti-inflammatories and Tylenol along with physical therapy.  Earlier today he was seen by surgery because of recent onset of nausea for the last 2 days.  He did have a CT scan of the abdomen that demonstrated inflammation of the duodenum along with distended gallbladder.  HIDA scan was requested by surgery.  Late this evening however he was trying to get up and had a bout of lightheadedness syncope along with a melena stool that precipitated calling the code which she returned to his normal state after some IV fluids and resuscitation.  Unfortunately his son came to visit him brought him a hamburger and a milkshake which he consumed a milkshake and a quarter of the hamburger about an hour ago.  He presently still continues to complain of some slight nausea and epigastric discomfort.  He denies any type of hematemesis or coffee-ground emesis denies any dysphagia odynophagia.    Review Of Systems:  Review of Systems   Constitutional: Positive for malaise/fatigue. Negative for fever.   HENT: Negative for sore throat.    Respiratory: Negative for cough and shortness of breath.    Cardiovascular: Negative for chest pain,  palpitations and leg swelling.   Gastrointestinal: Positive for abdominal pain, nausea and vomiting. Negative for blood in stool, constipation and diarrhea.   Genitourinary: Positive for frequency. Negative for dysuria and flank pain.   Musculoskeletal: Positive for joint pain.   Neurological: Negative for dizziness, focal weakness and headaches.   Psychiatric/Behavioral: The patient is nervous/anxious.      PMH:   Past Medical History:   Diagnosis Date   • Erectile dysfunction          PSH:  Past Surgical History:   Procedure Laterality Date   • PB PARTIAL HIP REPLACEMENT Right 1/21/2022    Procedure: HEMIARTHROPLASTY, HIP;  Surgeon: Lloyd Roman M.D.;  Location: SURGERY [facility];  Service: Orthopedics   • HERNIA REPAIR     • VASECTOMY         FAMILY HX:  Family History   Problem Relation Age of Onset   • Other Mother    • Other Father        SOCIAL HX:  Social History     Socioeconomic History   • Marital status:      Spouse name: Not on file   • Number of children: Not on file   • Years of education: Not on file   • Highest education level: Not on file   Occupational History   • Not on file   Tobacco Use   • Smoking status: Never Smoker   • Smokeless tobacco: Never Used   Substance and Sexual Activity   • Alcohol use: Yes     Alcohol/week: 0.0 oz   • Drug use: Not on file   • Sexual activity: Not on file   Other Topics Concern   • Not on file   Social History Narrative   • Not on file     Social Determinants of Health     Financial Resource Strain:    • Difficulty of Paying Living Expenses: Not on file   Food Insecurity:    • Worried About Running Out of Food in the Last Year: Not on file   • Ran Out of Food in the Last Year: Not on file   Transportation Needs:    • Lack of Transportation (Medical): Not on file   • Lack of Transportation (Non-Medical): Not on file   Physical Activity:    • Days of Exercise per Week: Not on file   • Minutes of Exercise per Session: Not on file   Stress:    •  Feeling of Stress : Not on file   Social Connections:    • Frequency of Communication with Friends and Family: Not on file   • Frequency of Social Gatherings with Friends and Family: Not on file   • Attends [employer] Services: Not on file   • Active Member of Clubs or Organizations: Not on file   • Attends Club or Organization Meetings: Not on file   • Marital Status: Not on file   Intimate Partner Violence:    • Fear of Current or Ex-Partner: Not on file   • Emotionally Abused: Not on file   • Physically Abused: Not on file   • Sexually Abused: Not on file   Housing Stability:    • Unable to Pay for Housing in the Last Year: Not on file   • Number of Places Lived in the Last Year: Not on file   • Unstable Housing in the Last Year: Not on file     Social History     Tobacco Use   Smoking Status Never Smoker   Smokeless Tobacco Never Used     Social History     Substance and Sexual Activity   Alcohol Use Yes   • Alcohol/week: 0.0 oz       Allergies/Intolerances:  No Known Allergies    History reviewed with the patient    Other Current Medications:    Current Facility-Administered Medications:   •  calcium carbonate (TUMS) chewable tab 500 mg, 500 mg, Oral, DAILY, Jocelin Merino M.D., 500 mg at 01/30/22 1106  •  pantoprazole (Protonix) injection 40 mg, 40 mg, Intravenous, BID, Adelina Butler M.D., 40 mg at 01/30/22 1959  •  lactated ringers infusion, , Intravenous, Continuous, Adelina Butler M.D., Last Rate: 125 mL/hr at 01/30/22 1920, Rate Change at 01/30/22 1920  •  lidocaine (LIDODERM) 5 % 1 Patch, 1 Patch, Transdermal, Q24HR, Ezekiel Vizcarra D.OAurelia, 1 Patch at 01/30/22 1108  •  QUEtiapine (Seroquel) tablet 50 mg, 50 mg, Oral, Nightly, Jocelin Merino M.D., 50 mg at 01/30/22 1958  •  ferrous sulfate tablet 325 mg, 325 mg, Oral, QDAY with Breakfast, Terri Matos M.D., 325 mg at 01/30/22 0906  •  oxyCODONE immediate-release (ROXICODONE) tablet 2.5 mg, 2.5 mg, Oral, Q3HRS PRN **OR** oxyCODONE immediate-release  (ROXICODONE) tablet 5 mg, 5 mg, Oral, Q3HRS PRN **OR** [DISCONTINUED] HYDROmorphone (Dilaudid) injection 0.5 mg, 0.5 mg, Intravenous, Q3HRS PRN, Lloyd Roman M.D.  •  [Held by provider] enoxaparin (LOVENOX) inj 40 mg, 40 mg, Subcutaneous, QDAY, Lloyd Roman M.D., 40 mg at 22 0426  •  Pharmacy Consult Request ...Pain Management Review 1 Each, 1 Each, Other, PHARMACY TO DOSE, Lloyd Roman M.D.  •  ondansetron (ZOFRAN) syringe/vial injection 4 mg, 4 mg, Intravenous, Q4HRS PRN, Lloyd Roman M.D., 4 mg at 22 1110  •  dexamethasone (DECADRON) injection 4 mg, 4 mg, Intravenous, Once PRN, Lloyd Roman M.D.  •  diphenhydrAMINE (BENADRYL) injection 25 mg, 25 mg, Intravenous, Q6HRS PRN, Lloyd Roman M.D.  •  haloperidol lactate (HALDOL) injection 1 mg, 1 mg, Intravenous, Q6HRS PRN, Lloyd Roman M.D.  •  scopolamine (TRANSDERM-SCOP) patch 1 Patch, 1 Patch, Transdermal, Q72HRS PRN, Lloyd Roman M.D.  •  docusate sodium (COLACE) capsule 100 mg, 100 mg, Oral, BID, Lloyd Roman M.D., 100 mg at 22 1721  •  senna-docusate (PERICOLACE or SENOKOT S) 8.6-50 MG per tablet 1 Tablet, 1 Tablet, Oral, Nightly, Lloyd Roman M.D., 1 Tablet at 22  •  senna-docusate (PERICOLACE or SENOKOT S) 8.6-50 MG per tablet 1 Tablet, 1 Tablet, Oral, Q24HRS PRN, Lloyd Roman M.D.  •  polyethylene glycol/lytes (MIRALAX) PACKET 1 Packet, 1 Packet, Oral, BID PRN, Lloyd Roman M.D., 1 Packet at 22 0431  •  magnesium hydroxide (MILK OF MAGNESIA) suspension 30 mL, 30 mL, Oral, QDAY PRN, Lloyd Roman M.D., 30 mL at 22 0505  •  bisacodyl (DULCOLAX) suppository 10 mg, 10 mg, Rectal, Q24HRS PRN, Lloyd Roman M.D.  •  sodium phosphate (Fleet) enema 133 mL, 1 Each, Rectal, Once PRN, Lloyd Roman M.D.  •  [] ketorolac (TORADOL) injection 15 mg, 15 mg, Intravenous, Q6HRS, 15 mg at 22 1157 **FOLLOWED BY** ibuprofen (MOTRIN) tablet  "800 mg, 800 mg, Oral, TID PRN, Lloyd Roman M.D., 800 mg at 22 0829  •  [] acetaminophen (Tylenol) tablet 650 mg, 650 mg, Oral, Q6HRS, 650 mg at 22 2346 **FOLLOWED BY** acetaminophen (Tylenol) tablet 650 mg, 650 mg, Oral, Q6HRS PRN, Lloyd Roman M.D., 650 mg at 22 2345  •  labetalol (NORMODYNE/TRANDATE) injection 10 mg, 10 mg, Intravenous, Q4HRS PRN, Govind Reyes M.D.  •  atorvastatin (LIPITOR) tablet 40 mg, 40 mg, Oral, DAILY, Govind Reyes M.D., 40 mg at 22 0426  •  folic acid (FOLVITE) tablet 1 mg, 1 mg, Oral, DAILY, Luis F Guillen D.O., 1 mg at 22 0426  •  cyanocobalamin (VITAMIN B-12) tablet 1,000 mcg, 1,000 mcg, Oral, DAILY, NEISHA BellOAurelia, 1,000 mcg at 22 0426  [unfilled]    Most Recent Vital Signs:  /61   Pulse 87   Temp 36.9 °C (98.5 °F)   Resp 16   Ht 1.803 m (5' 11\")   Wt 90.7 kg (200 lb)   SpO2 98% Comment: 3L  BMI 27.89 kg/m²   Temp  Av.7 °C (98 °F)  Min: 35.9 °C (96.7 °F)  Max: 37.3 °C (99.2 °F)    Physical Exam:  General: Nontoxic, no acute distress  HEENT: sclera anicteric, PERRL, EOMI, MMM, no oral lesions  Neck: supple, no lymphadenopathy  Chest: CTAB, no r/r/w, normal work of breathing.  Cardiac: Regular, no murmurs no gallops heard  Abdomen: + bowel sounds, soft, non-tender, non-distended, no HSM, no guarding or rebound  Extremities: No edema. No joint swelling.  Skin: no rashes or erythema  Neuro: Alert and oriented times 3, non-focal exam    Pertinent Lab Results:  Recent Labs     22  0435 22  1159 22  1849   WBC 10.9* 14.4* 12.7*      Recent Labs     22  0435 22  1159 22  1849   HEMOGLOBIN 10.1* 9.4* 9.0*   HEMATOCRIT 29.4* 27.9* 26.9*   .8* 103.3* 102.7*   MCH 35.3* 34.8* 34.4*   PLATELETCT 356 387 379         Recent Labs     22  0435 22  1159   SODIUM 140 136   POTASSIUM 4.1 3.9   CHLORIDE 105 105   CO2 23 18*   CREATININE 0.78 0.75        No " "results for input(s): ALBUMIN in the last 72 hours.    Invalid input(s): AST, ALT, ALKPHOS, BILITOT, TOTALBILIRUB, BILIRUBINTOT, BILIRUBINDIR, BILIRUBININD, ALKALINEPHOS   Recent Labs     01/30/22  1849   INR 1.31*       [unfilled]      Pertinent Micro:  Results     Procedure Component Value Units Date/Time    BLOOD CULTURE [[id number]] Collected: 01/28/22 1510    Order Status: Completed Specimen: Blood from Peripheral Updated: 01/29/22 0826     Significant Indicator NEG     Source BLD     Site PERIPHERAL     Culture Result No Growth  Note: Blood cultures are incubated for 5 days and  are monitored continuously.Positive blood cultures  are called to the RN and reported as soon as  they are identified.      Narrative:      Per Hospital Policy: Only change Specimen Src: to \"Line\" if  specified by physician order.  Left Hand    BLOOD CULTURE [[id number]] Collected: 01/28/22 1510    Order Status: Completed Specimen: Blood from Peripheral Updated: 01/29/22 0826     Significant Indicator NEG     Source BLD     Site PERIPHERAL     Culture Result No Growth  Note: Blood cultures are incubated for 5 days and  are monitored continuously.Positive blood cultures  are called to the RN and reported as soon as  they are identified.      Narrative:      Per Hospital Policy: Only change Specimen Src: to \"Line\" if  specified by physician order.  Right Hand    URINALYSIS [[id number]]  (Abnormal) Collected: 01/25/22 1950    Order Status: Completed Specimen: Urine, Clean Catch Updated: 01/26/22 0859     Color DK Yellow     Character Clear     Specific Gravity 1.032     Ph 6.0     Glucose Negative mg/dL      Ketones 40 mg/dL      Protein 100 mg/dL      Bilirubin Moderate     Urobilinogen, Urine 2.0     Nitrite Negative     Leukocyte Esterase Negative     Occult Blood Negative     Micro Urine Req Microscopic    Narrative:      Collected By: [id number] CHLOÉ LUIS        No results found for: BLOODCULTU, BLDCULT, BCHOLD     Studies:      "         Results for orders placed during the hospital encounter of 12/01/09    DX-FOOT-COMPLETE 3+    Impression  IMPRESSION:    1. DIFFUSE SOFT TISSUE SWELLING OF THE LEFT SECOND TOE CONSISTENT WITH  INFECTION OR INFLAMMATION, WITH NO EVIDENCE OF OSTEOMYELITIS.  MRI IS  MORE SENSITIVE FOR THE DETECTION OF OSTEOMYELITIS.    2. NEGATIVE FOR ACUTE BONE OR JOINT SPACE PATHOLOGY IN THE LEFT FOOT.    3. CALCANEAL BONE SPURS.                Results for orders placed during the hospital encounter of 02/09/17    DX-LUMBAR SPINE-4+ VIEWS    Impression  No compression deformity or acute fracture is identified.  DEGENERATIVE DISC DISEASE AND FACET ARTHROPATHY ARE MOST PROMINENT AT THE L5-S1 LEVEL.   Results for orders placed during the hospital encounter of 01/19/22    DX-PELVIS-1 OR 2 VIEWS    Impression  Mildly displaced transcervical right femoral neck fracture.               Results for orders placed during the hospital encounter of 12/01/09    DX-TOE(S) 2+    Impression  IMPRESSION:    DIFFUSE SOFT TISSUE SWELLING OF THE LEFT SECOND TOE CONSISTENT WITH  INFECTION OR INFLAMMATION, WITH NO EVIDENCE OF OSTEOMYELITIS.  MRI IS  MORE SENSITIVE FOR THE DETECTION OF OSTEOMYELITIS.    SST/tho    Read By SYD PERLA MD on Dec  1 2009  5:46PM  : THO Transcription Date: Dec  1 2009  6:57PM  THIS DOCUMENT HAS BEEN ELECTRONICALLY SIGNED BY: SYD PERLA MD on  Dec  1 2009  8:33PM         T scan of the abdomen    IMPRESSION:     1.  Wall thickening of the duodenal bulb and second portion of the duodenum with surrounding inflammation can be seen in duodenitis/peptic ulcer disease.  2.  Gallbladder is distended with pericholecystic fluid. This can be seen in the setting of cholecystitis.  3.  Atherosclerotic plaque within an ectatic aorta.  4.  Colonic diverticulosis.  5.  2.1 x 2.5 cm fluid collection overlying the right hip arthroplasty is likely a postsurgical evolving hematoma/seroma.  6.  Small hiatal  hernia.  7.  Small right pleural effusion and bibasilar atelectasis.  8.  Cardiomegaly.  9.  Linear hypodensity in the inferior spleen could represent arith Padilla    small area of splenic infarction    IMPRESSION:     Melena  Anemia  Nausea  Abnormal CT scan      PLAN:     Given his presentation elderly gentleman with a hip fracture given NSAIDs and narcotics over the course of several days he may have precipitated some duodenitis as consistent with the CT scan findings that were performed earlier today.  He then subsequently had a melanotic stool followed by syncopal episode but not loss of consciousness.  Rapid response was called and responded to IV bolus fluids and subsequent we were called for further evaluation.    Recommend 2 large IV bore needles  IV hydration and continued supportive care  IV Protonix 40 mg twice daily  N.p.o., unfortunately he ate at 7:00pm  precluding any type of upper endoscopy at the present time.  Consent for an EGD, with plans for it to be performed tomorrow  Monitor his H&H and keep greater than 7 and 21, monitor every 4-6 hours transfuse when needed.    Most likely precipitating causes the NSAID use stress from the fracture and his old age.    We will continue to follow.  There is any questions or concerns please feel free to give us call at [phone].  I was discussed with nursing staff and primary team.

## 2022-01-31 NOTE — CARE PLAN
The patient is Stable - Low risk of patient condition declining or worsening    Shift Goals  Clinical Goals: Wean off O2  Patient Goals: Comfort  Family Goals: N/A    Progress made toward(s) clinical / shift goals:    Problem: Pain - Standard  Goal: Alleviation of pain or a reduction in pain to the patient’s comfort goal  Outcome: Progressing  Note:    Administering pain mediations as ordered (see MAR). Providing patient with cold packs, and repositioning as needed.         Problem: Fall Risk  Goal: Patient will remain free from falls  Outcome: Progressing  Note: Call light & belongings in reach, bed in low position and locked, front wheel walker out of sight, siderails up x 2, pt wearing non-slip footwear, adequate lighting, clutter free environment, bed alarm on. Educated on level of fall risk, oriented to use of call light and encouraged pt to call before attempting to get out of bed.         Patient is not progressing towards the following goals:

## 2022-01-31 NOTE — PROGRESS NOTES
Patient awake and oriented x 4. Continued to state having hallucinations. Spoke to his Daughter Kaitlynn and updated her on his plan of care. She asked if her brother can bring him food from home due to his poor appetite. Agreed he might prefer food from home. Patient had CT of the abdomen and pelvis today. HD scan ordered. Continues on oxymask at 1 lpm. Lidoderm patch placed on right hip. Continues on LR at 50 ml/hr

## 2022-01-31 NOTE — ANESTHESIA PREPROCEDURE EVALUATION
Case: [id number] Date/Time: 01/31/22 0845    Procedure: GASTROSCOPY (N/A Esophagus)    Anesthesia type: General    Pre-op diagnosis: MELENA    Location: [facility] ROOM 26 / SURGERY SAME DAY [facility]    Surgeons: Ti Foster M.D.          Relevant Problems   No relevant active problems       Physical Exam    Airway   Mallampati: II  TM distance: >3 FB  Neck ROM: full       Cardiovascular - normal exam  Rhythm: regular  Rate: normal  (-) murmur     Dental - normal exam           Pulmonary - normal exam  Breath sounds clear to auscultation     Abdominal    Neurological - normal exam                 Anesthesia Plan    ASA 3       Plan - general       Airway plan will be natural airway    (Propofol TIVA, AMS, extremem old age, multiple medical problems, syncope)      Induction: intravenous    Postoperative Plan: Postoperative administration of opioids is intended.    Pertinent diagnostic labs and testing reviewed    Informed Consent:    Anesthetic plan and risks discussed with patient.    Use of blood products discussed with: patient whom consented to blood products.

## 2022-01-31 NOTE — DIETARY
"Nutrition services: Day 12 of admit.  Navin Suazo is a 89 y.o. male with admitting DX of closed right hip fracture. Current hospital problems include Abnormal CT scan: gallbladder, encephalopathy, CHAPITO, Stage 3a CKD and CAD.     Consult received for failure to thrive. Pt was off floor for HIDA/GI scope, RD unable to visit pt at bedside.       Assessment:  Height: 180.3 cm (5' 11\")  Weight: 90.7 kg (200 lb) from \"other healthcare facility\" asked RN for updated wt when able   Body mass index is 27.89 kg/m²., BMI classification: Over weight   Diet/Intake: Cardiac diet. Limited intake recorded <25% x 1 meal. 25-50% x 1 meal.     Evaluation:   1. Consult for failure to thrive. Pt with limited wt hx in chart, unable to assess wt trend.   2. RD updated supplements from Boost breeze to Boost Plus BID with a Magic cup once a day to add calories and protein and encourage nutrition.   3. Current clinical picture and MD progress notes reviewed.   4. Labs reviewed  5. Meds vit b 12, dexamethasone, colace, ferrous sulfate, folic acid, LR @ 125 mL/hr, zofran, oxycodone,   6. +BM 1/30  7. Skin: no pressure injuries noted.     Malnutrition Risk: unable to meet criteria per ASPEN guidelines    Recommendations/Plan:  1. Cardiac diet as tolerated. Boost Plus BID and magic cup q day.    2. Encourage intake of all meals and supplements  3. Document intake of all meals and supplements  as % taken in ADL's to provide interdisciplinary communication across all shifts.   4. Monitor weight.  5. Nutrition rep will continue to see patient for ongoing meal and snack preferences.     RD following      "

## 2022-01-31 NOTE — THERAPY
01/31/22 1149   Other Treatments   Other Treatments Provided Pt unavailable for PT, in procedure. PT will resume tomorrow.       Island Pedicle Flap With Canthal Suspension Text: The defect edges were debeveled with a #15 scalpel blade.  Given the location of the defect, shape of the defect and the proximity to free margins an island pedicle advancement flap was deemed most appropriate.  Using a sterile surgical marker, an appropriate advancement flap was drawn incorporating the defect, outlining the appropriate donor tissue and placing the expected incisions within the relaxed skin tension lines where possible. The area thus outlined was incised deep to adipose tissue with a #15 scalpel blade.  The skin margins were undermined to an appropriate distance in all directions around the primary defect and laterally outward around the island pedicle utilizing iris scissors.  There was minimal undermining beneath the pedicle flap. A suspension suture was placed in the canthal tendon to prevent tension and prevent ectropion.

## 2022-01-31 NOTE — PROGRESS NOTES
During attempt to pivot patient to a bedside commode he had a large black liquid stool. He then became unresponsive. Weak pulse with respirations. Placed on oxymask at 5 lpm. Rapid called. Patient unresponsive for approximately five minutes. He then awoke and was alert. Started 500 ml bolus.

## 2022-01-31 NOTE — PROGRESS NOTES
"Was called for Rapid Response for syncope and large melena stool.  Patient was at side of bed and had large black stool then had syncopal episode, unresponsive, \"thready\" pulse per Rn.  By the time I came to bedside he was awake and alert.  I have 500cc LR bolus, will get CBC, CMP, coags, ECG.  CT AP showed duodenal inflammation, worry about UGIB.  Will call GI once I get CBC back and start PPI IV BID.  Hold lovenox.  NPO for now.          "

## 2022-01-31 NOTE — ASSESSMENT & PLAN NOTE
Secondary to peptic ulcer disease.  HIDA scan unremarkable.  Patient epigastric pain has improved, continue PPI twice daily

## 2022-01-31 NOTE — ANESTHESIA TIME REPORT
Anesthesia Start and Stop Event Times     Date Time Event    1/31/2022 1048 Ready for Procedure     1048 Anesthesia Start     1103 Anesthesia Stop        Responsible Staff  01/31/22    Name Role Begin End    Marino Quezada III, M.D. Anesth 1048 1103        Preop Diagnosis (Free Text):  Pre-op Diagnosis     MELENA        Preop Diagnosis (Codes):    Premium Reason  Non-Premium    Comments:

## 2022-01-31 NOTE — PROGRESS NOTES
"   Orthopaedic Progress Note    Interval changes:  Patient doing well  Dressing CDI  RLE dressings CDI    ROS - Patient denies any new issues but is confused.  Pain well controlled.    /61   Pulse 87   Temp 36.9 °C (98.5 °F)   Resp 16   Ht 1.803 m (5' 11\")   Wt 90.7 kg (200 lb)   SpO2 98%       Patient seen and examined  No acute distress  Breathing non labored  RRR  RLE dressings CDI, DNVI, moves all toes, cap refill <2 sec.     Recent Labs     01/29/22  0435 01/30/22  1159   WBC 10.9* 14.4*   RBC 2.86* 2.70*   HEMOGLOBIN 10.1* 9.4*   HEMATOCRIT 29.4* 27.9*   .8* 103.3*   MCH 35.3* 34.8*   MCHC 34.4 33.7   RDW 44.3 44.9   PLATELETCT 356 387   MPV 9.2 9.8       Active Hospital Problems    Diagnosis    • Epigastric pain [R10.13]    • DNR (do not resuscitate) [Z66]    • Vasovagal syncope [R55]    • Encephalopathy [G93.40]    • Acute blood loss anemia [D62]    • CHAPITO (acute kidney injury) ([facility]) [N17.9]    • Stage 3a chronic kidney disease ([facility]) [N18.31]    • Elevated MCV [R71.8]    • CAD (coronary artery disease), native coronary artery [I25.10]    • Closed right hip fracture, initial encounter ([facility]) [S72.001A]    • Mixed hyperlipidemia [E78.2]    • Insomnia [G47.00]        Assessment/Plan:  Patient doing well  Dressing CDI  RLE dressings CDI  POD#9 S/P Open treatment of right femoral fracture, proximal end, neck with prosthetic replacement  Wt bearing status - WBAT  Wound care/Drains - Dressings to be changed every other day by nursing  Future Procedures - none planned   Lovenox: Start 1/22, Duration-until ambulatory > 150'  Sutures/Staples out- 14 days post operatively  PT/OT-initiated  Antibiotics:  Perioperative completed  DVT Prophylaxis- TEDS/SCDs/Foot pumps  Raya-none  Case Coordination for Discharge Planning - Disposition SNF     I have performed a physical exam and reviewed and updated ROS and Plan today (1/30). In review of yesterday's note (1/29), there are no changes except as documented " above.

## 2022-01-31 NOTE — PROGRESS NOTES
"    DATE: 1/31/2022    Hospital Day 12 consulted for evaluation of distended gallbladder.    INTERVAL EVENTS:  Patient back in room after HIDA scan and GI scope  Patient denies any abdominal pain currently    Future plan dependent on HIDA scan results     PHYSICAL EXAMINATION:  Vital Signs: BP (!) 97/52   Pulse 93   Temp 36.1 °C (97 °F)   Resp 20   Ht 1.803 m (5' 11\")   Wt 90.7 kg (200 lb)   SpO2 94%     GENERAL: NAD, laying in bed, very hard of hearing.  SKIN: Warm, dry  RESPIRATIONS: unlabored, equal  ABDOMEN:soft without distention.   NEURO: Awake and alert X 3  Moves all extremities equal and bilateral       LABORATORY VALUES:   Recent Labs     01/30/22  1159 01/30/22  1159 01/30/22 1849 01/30/22 1849 01/30/22  2231 01/31/22  0258 01/31/22  0659   WBC 14.4*  --  12.7*  --   --  11.9*  --    RBC 2.70*  --  2.62*  --   --  2.45*  --    HEMOGLOBIN 9.4*   < > 9.0*   < > 8.7* 8.6* 8.9*   HEMATOCRIT 27.9*   < > 26.9*   < > 25.6* 25.1* 25.8*   .3*  --  102.7*  --   --  102.4*  --    MCH 34.8*  --  34.4*  --   --  35.1*  --    MCHC 33.7  --  33.5*  --   --  34.3  --    RDW 44.9  --  44.4  --   --  43.8  --    PLATELETCT 387  --  379  --   --  381  --    MPV 9.8  --  9.0  --   --  9.3  --     < > = values in this interval not displayed.     Recent Labs     01/29/22  0435 01/30/22  1159 01/31/22  0258   SODIUM 140 136 137   POTASSIUM 4.1 3.9 3.6   CHLORIDE 105 105 105   CO2 23 18* 22   GLUCOSE 120* 122* 109*   BUN 33* 27* 21   CREATININE 0.78 0.75 0.81   CALCIUM 8.0* 8.2* 8.2*     Recent Labs     01/30/22 1849 01/31/22  0258   ASTSGOT  --  20   ALTSGPT  --  24   TBILIRUBIN  --  0.8   ALKPHOSPHAT  --  127*   GLOBULIN  --  2.6   INR 1.31*  --      Recent Labs     01/30/22 1849   APTT 34.8   INR 1.31*        IMAGING:   CT-ABDOMEN-PELVIS WITH   Final Result      1.  Wall thickening of the duodenal bulb and second portion of the duodenum with surrounding inflammation can be seen in duodenitis/peptic ulcer " disease.   2.  Gallbladder is distended with pericholecystic fluid. This can be seen in the setting of cholecystitis.   3.  Atherosclerotic plaque within an ectatic aorta.   4.  Colonic diverticulosis.   5.  2.1 x 2.5 cm fluid collection overlying the right hip arthroplasty is likely a postsurgical evolving hematoma/seroma.   6.  Small hiatal hernia.   7.  Small right pleural effusion and bibasilar atelectasis.   8.  Cardiomegaly.   9.  Linear hypodensity in the inferior spleen could represent a small area of splenic infarction.            DX-CHEST-PORTABLE (1 VIEW)   Final Result      No evidence of acute cardiopulmonary process.      DX-CHEST-LIMITED (1 VIEW)   Final Result      1.  Cardiomegaly.   2.  Slight interstitial prominence. No consolidation or pleural effusion.      DX-FEMUR-2+ RIGHT   Final Result      Mildly angulated and displaced transcervical right femoral neck fracture.      DX-PELVIS-1 OR 2 VIEWS   Final Result      Mildly displaced transcervical right femoral neck fracture.      CT-PELVIS W/O PLUS RECONS   Final Result      Mildly comminuted, angulated and displaced transcervical right femoral neck fracture.      CT-HEAD W/O   Final Result      1.  Chronic ischemic changes.   2.  No acute intracranial abnormality.         CT-CSPINE WITHOUT PLUS RECONS   Final Result      1.  No acute fracture or dislocation of the cervical spine.   2.  Mild degeneration.   3.  Severe carotid calcified plaque.      NM-HEPATOBILIARY SCAN    (Results Pending)       ASSESSMENT AND PLAN:     Abnormal CT scan, gallbladder  Assessment & Plan  Gallbladder is distended with pericholecystic fluid.  1/31 HIDA scan pending            ____________________________________     JOSE ROBERTO DuttonPARMIN.    DD: 1/31/2022  12:52 PM

## 2022-01-31 NOTE — PROGRESS NOTES
Physical Medicine and Rehabilitation Consultation              Date of initial consultation: 1/24/2022  Requested by: Terri Matos MD  Consulting physician: Ezekiel Vizcarra D.O.  Reason for consultation: assessment of rehabilitation needs  LOS: 12 Day(s)    SUBJECTIVE:  Patient seen in room. No sitter present  GI: recorded as 1/30/2022  : continent  Psych: Reports bad sleep. Hears noises at night - reporting he heard a radio  Neuro: patient reporting unable to get cell phone to work. Reviewed how to use his cell phone and then he was able to call his daughter Kaitlynn - showing impaired cognition. But also able to recall today's events of getting more imaging and possible need for surgery  MSK: Denies pain. Worked with therapies on 1/28 and walked 12 feet with minimal assistance and front wheel walker    PMH:  Past Medical History:   Diagnosis Date   • Erectile dysfunction        PSH:  Past Surgical History:   Procedure Laterality Date   • AL UPPER GI ENDOSCOPY,DIAGNOSIS N/A 1/31/2022    Procedure: GASTROSCOPY;  Surgeon: Ti Foster M.D.;  Location: SURGERY SAME DAY [facility];  Service: Gastroenterology   • PB PARTIAL HIP REPLACEMENT Right 1/21/2022    Procedure: HEMIARTHROPLASTY, HIP;  Surgeon: Lloyd Roman M.D.;  Location: SURGERY [facility];  Service: Orthopedics   • HERNIA REPAIR     • VASECTOMY         FHX:  Family History   Problem Relation Age of Onset   • Other Mother    • Other Father        Medications:  Current Facility-Administered Medications   Medication Dose   • QUEtiapine (Seroquel) tablet 75 mg  75 mg   • calcium carbonate (TUMS) chewable tab 500 mg  500 mg   • pantoprazole (Protonix) injection 40 mg  40 mg   • lactated ringers infusion     • lidocaine (LIDODERM) 5 % 1 Patch  1 Patch   • ferrous sulfate tablet 325 mg  325 mg   • oxyCODONE immediate-release (ROXICODONE) tablet 2.5 mg  2.5 mg    Or   • oxyCODONE immediate-release (ROXICODONE) tablet 5 mg  5 mg   • [Held by  "provider] enoxaparin (LOVENOX) inj 40 mg  40 mg   • Pharmacy Consult Request ...Pain Management Review 1 Each  1 Each   • ondansetron (ZOFRAN) syringe/vial injection 4 mg  4 mg   • dexamethasone (DECADRON) injection 4 mg  4 mg   • diphenhydrAMINE (BENADRYL) injection 25 mg  25 mg   • haloperidol lactate (HALDOL) injection 1 mg  1 mg   • scopolamine (TRANSDERM-SCOP) patch 1 Patch  1 Patch   • docusate sodium (COLACE) capsule 100 mg  100 mg   • senna-docusate (PERICOLACE or SENOKOT S) 8.6-50 MG per tablet 1 Tablet  1 Tablet   • senna-docusate (PERICOLACE or SENOKOT S) 8.6-50 MG per tablet 1 Tablet  1 Tablet   • polyethylene glycol/lytes (MIRALAX) PACKET 1 Packet  1 Packet   • magnesium hydroxide (MILK OF MAGNESIA) suspension 30 mL  30 mL   • bisacodyl (DULCOLAX) suppository 10 mg  10 mg   • sodium phosphate (Fleet) enema 133 mL  1 Each   • ibuprofen (MOTRIN) tablet 800 mg  800 mg   • acetaminophen (Tylenol) tablet 650 mg  650 mg   • labetalol (NORMODYNE/TRANDATE) injection 10 mg  10 mg   • atorvastatin (LIPITOR) tablet 40 mg  40 mg   • folic acid (FOLVITE) tablet 1 mg  1 mg   • cyanocobalamin (VITAMIN B-12) tablet 1,000 mcg  1,000 mcg       Allergies:  No Known Allergies    Physical Exam:  Vitals: BP (!) 97/52   Pulse 93   Temp 36.1 °C (97 °F)   Resp 20   Ht 1.803 m (5' 11\")   Wt 90.7 kg (200 lb)   SpO2 94%   General: well-groomed in no acute distress, no visitors present  Eyes: no scleral icterus or conjunctival injection  Ears, nose, mouth and throat: moist oral mucosa  Cardiovascular: good peripheral perfusion  Respiratory: breathing comfortably without use of accessory muscles, no NC  Gastrointestinal: soft, nontender, nondistended  Genitourinary: no indwelling leslie  Musculoskeletal: good symmetry in bilateral shoulders,  Skin: no wounds seen on exposed skin    Neurologic:  Mental status:  answers questions appropriately, follows commands  Speech: fluent, no aphasia or dysarthria  Tone: no spasticity " noted  Psychiatric: flat affect  Hematologic/lymphatic/immunologic: ++IV access, no bruises seen on exposed skin    Labs: Reviewed and significant for   Recent Labs     22  1159 22  1159 22  1849 22  2231 22  0258 22  0659 22  1338   RBC 2.70*  --  2.62*  --  2.45*  --   --    HEMOGLOBIN 9.4*   < > 9.0*   < > 8.6* 8.9* 8.3*   HEMATOCRIT 27.9*   < > 26.9*   < > 25.1* 25.8* 24.4*   PLATELETCT 387  --  379  --  381  --   --    PROTHROMBTM  --   --  15.9*  --   --   --   --    APTT  --   --  34.8  --   --   --   --    INR  --   --  1.31*  --   --   --   --     < > = values in this interval not displayed.     Recent Labs     22  0435 22  1159 22  0258   SODIUM 140 136 137   POTASSIUM 4.1 3.9 3.6   CHLORIDE 105 105 105   CO2 23 18* 22   GLUCOSE 120* 122* 109*   BUN 33* 27* 21   CREATININE 0.78 0.75 0.81   CALCIUM 8.0* 8.2* 8.2*     Recent Results (from the past 24 hour(s))   POCT glucose device results    Collection Time: 22  6:28 PM   Result Value Ref Range    Glucose - Accu-Ck 160 (H) 65 - 99 mg/dL   EKG    Collection Time: 22  6:43 PM   Result Value Ref Range    Report       [facility]    Test Date:  2022  Pt Name:    MISSY OLIVAS                Department: 131  MRN:        [id number]                      Room:       [facility]  Gender:     Male                         Technician: REBEKAH  :        [id number]                   Requested By:WING GRANADOS  Order #:    [id number]                    Reading MD: Kailey Sharma    Measurements  Intervals                                Axis  Rate:       88                           P:          0  AK:         198                          QRS:        -38  QRSD:       92                           T:          103  QT:         368  QTc:        446    Interpretive Statements  Sinus with PACs, perhaps wandering pacemaker  LEFT AXIS DEVIATION  Nonspecific T wave changes  Electronically Signed On 2022  19:59:43 PST by Kailey Sharma     CBC WITH DIFFERENTIAL    Collection Time: 01/30/22  6:49 PM   Result Value Ref Range    WBC 12.7 (H) 4.8 - 10.8 K/uL    RBC 2.62 (L) 4.70 - 6.10 M/uL    Hemoglobin 9.0 (L) 14.0 - 18.0 g/dL    Hematocrit 26.9 (L) 42.0 - 52.0 %    .7 (H) 81.4 - 97.8 fL    MCH 34.4 (H) 27.0 - 33.0 pg    MCHC 33.5 (L) 33.7 - 35.3 g/dL    RDW 44.4 35.9 - 50.0 fL    Platelet Count 379 164 - 446 K/uL    MPV 9.0 9.0 - 12.9 fL    Neutrophils-Polys 81.80 (H) 44.00 - 72.00 %    Lymphocytes 9.80 (L) 22.00 - 41.00 %    Monocytes 6.90 0.00 - 13.40 %    Eosinophils 0.30 0.00 - 6.90 %    Basophils 0.50 0.00 - 1.80 %    Immature Granulocytes 0.70 0.00 - 0.90 %    Nucleated RBC 0.00 /100 WBC    Neutrophils (Absolute) 10.38 (H) 1.82 - 7.42 K/uL    Lymphs (Absolute) 1.25 1.00 - 4.80 K/uL    Monos (Absolute) 0.88 (H) 0.00 - 0.85 K/uL    Eos (Absolute) 0.04 0.00 - 0.51 K/uL    Baso (Absolute) 0.06 0.00 - 0.12 K/uL    Immature Granulocytes (abs) 0.09 0.00 - 0.11 K/uL    NRBC (Absolute) 0.00 K/uL   Prothrombin Time    Collection Time: 01/30/22  6:49 PM   Result Value Ref Range    PT 15.9 (H) 12.0 - 14.6 sec    INR 1.31 (H) 0.87 - 1.13   APTT    Collection Time: 01/30/22  6:49 PM   Result Value Ref Range    APTT 34.8 24.7 - 36.0 sec   HEMOGLOBIN AND HEMATOCRIT    Collection Time: 01/30/22 10:31 PM   Result Value Ref Range    Hemoglobin 8.7 (L) 14.0 - 18.0 g/dL    Hematocrit 25.6 (L) 42.0 - 52.0 %   Comp Metabolic Panel    Collection Time: 01/31/22  2:58 AM   Result Value Ref Range    Sodium 137 135 - 145 mmol/L    Potassium 3.6 3.6 - 5.5 mmol/L    Chloride 105 96 - 112 mmol/L    Co2 22 20 - 33 mmol/L    Anion Gap 10.0 7.0 - 16.0    Glucose 109 (H) 65 - 99 mg/dL    Bun 21 8 - 22 mg/dL    Creatinine 0.81 0.50 - 1.40 mg/dL    Calcium 8.2 (L) 8.5 - 10.5 mg/dL    AST(SGOT) 20 12 - 45 U/L    ALT(SGPT) 24 2 - 50 U/L    Alkaline Phosphatase 127 (H) 30 - 99 U/L    Total Bilirubin 0.8 0.1 - 1.5 mg/dL    Albumin 2.9 (L) 3.2 -  4.9 g/dL    Total Protein 5.5 (L) 6.0 - 8.2 g/dL    Globulin 2.6 1.9 - 3.5 g/dL    A-G Ratio 1.1 g/dL   CBC WITH DIFFERENTIAL    Collection Time: 01/31/22  2:58 AM   Result Value Ref Range    WBC 11.9 (H) 4.8 - 10.8 K/uL    RBC 2.45 (L) 4.70 - 6.10 M/uL    Hemoglobin 8.6 (L) 14.0 - 18.0 g/dL    Hematocrit 25.1 (L) 42.0 - 52.0 %    .4 (H) 81.4 - 97.8 fL    MCH 35.1 (H) 27.0 - 33.0 pg    MCHC 34.3 33.7 - 35.3 g/dL    RDW 43.8 35.9 - 50.0 fL    Platelet Count 381 164 - 446 K/uL    MPV 9.3 9.0 - 12.9 fL    Neutrophils-Polys 80.20 (H) 44.00 - 72.00 %    Lymphocytes 10.20 (L) 22.00 - 41.00 %    Monocytes 7.60 0.00 - 13.40 %    Eosinophils 0.50 0.00 - 6.90 %    Basophils 0.30 0.00 - 1.80 %    Immature Granulocytes 1.20 (H) 0.00 - 0.90 %    Nucleated RBC 0.00 /100 WBC    Neutrophils (Absolute) 9.55 (H) 1.82 - 7.42 K/uL    Lymphs (Absolute) 1.21 1.00 - 4.80 K/uL    Monos (Absolute) 0.91 (H) 0.00 - 0.85 K/uL    Eos (Absolute) 0.06 0.00 - 0.51 K/uL    Baso (Absolute) 0.03 0.00 - 0.12 K/uL    Immature Granulocytes (abs) 0.14 (H) 0.00 - 0.11 K/uL    NRBC (Absolute) 0.00 K/uL   ESTIMATED GFR    Collection Time: 01/31/22  2:58 AM   Result Value Ref Range    GFR If African American >60 >60 mL/min/1.73 m 2    GFR If Non African American >60 >60 mL/min/1.73 m 2   HEMOGLOBIN AND HEMATOCRIT    Collection Time: 01/31/22  6:59 AM   Result Value Ref Range    Hemoglobin 8.9 (L) 14.0 - 18.0 g/dL    Hematocrit 25.8 (L) 42.0 - 52.0 %   POCT SARS-COV Antigen MATILDE manual result ([facility] Only)    Collection Time: 01/31/22  7:15 AM   Result Value Ref Range    Internal  Valid     SARS-COV ANTIGEN MATILDE Negative    HEMOGLOBIN AND HEMATOCRIT    Collection Time: 01/31/22  1:38 PM   Result Value Ref Range    Hemoglobin 8.3 (L) 14.0 - 18.0 g/dL    Hematocrit 24.4 (L) 42.0 - 52.0 %         ASSESSMENT:  IMPRESSION: The patient is a 89 y.o. male with a past medical history of coronary artery disease s/p stent, dyslipidemia, hard of hearing;   who presented on 1/19/2022 10:33 PM after fall and found to have Mildly comminuted, angulated and displaced transcervical right femoral neck fracture.     [facility] Code: 0008.11 - Orthopaedic Disorders: Status Post Unilateral Hip Fracture  -With acute secondary complications of: impaired ADLs and mobility, posttraumatic and postsurgical pain  -with chronic conditions of: hard of hearing  -and:     Medical Complexity:  Macrocytosis    RECOMMENDATIONS:    ##MSK  #Impaired ADLs and mobility: Agree with continuing OT/PT while admitted here.    Patient would benefit from aggressive OT and PT in acute inpatient rehabilitation. Currently, barrier to acute inpatient rehabilitation includes:  patient does not have a safe final discharge disposition    #Mildly comminuted, angulated and displaced transcervical right femoral neck fracture s/p Open treatment of right femoral fracture, proximal end, neck with prosthetic replacement on 1/21/2022 by Dr. Lloyd Roman MD.   Weight bearing as tolerated with posterior hip precautions.    #Posttraumatic pain, acute, controlled  #Postsurgical pain, acute, controlled  Continue oxycodone 2.5-5mg Q3hr PRN pain, acetaminophen 650mg Q6hr PRN pain  Ordered lidocaine 5% patch to right hip - avoid incision area    ##PSYCH  #Hallucinations, improving  #Impaired sleep  Likely multifactorial  Ordered increase to seroquel 75mg QHS    Recommend good sleep hygiene with lights on/window shade up during the day, out of bed and in chair for meals, and in bed with lights off and minimal interruptions at night.    ##  Continent    ##SKIN  #Incision site  Routine wound care    DVT chemoprophlaxis: lovenox 40mg daily  Code: full resuscitation    Thank you for allowing us to participate in the care of this patient. Physiatry will continue to follow and provide recommendations, as needed.    Patient was seen for 26 minutes on unit/floor of which > 50% of time was spent on counseling and coordination of care  regarding the above, including prognosis, risk reduction, benefits of treatment, and options for next stage of care.    Ezekiel Vizcarra D.O.   Physical Medicine and Rehabilitation     I have performed a physical exam and reviewed and updated history and plan today (1/31/2022).     Please note that this dictation was created using voice recognition software. I have made every reasonable attempt to correct obvious errors, but there may be errors of grammar and possibly content that I did not discover before finalizing the note.

## 2022-02-01 ENCOUNTER — APPOINTMENT (OUTPATIENT)
Dept: CARDIOLOGY | Facility: MEDICAL CENTER | Age: 87
DRG: 521 | End: 2022-02-01
Attending: HOSPITALIST
Payer: MEDICARE

## 2022-02-01 LAB
ALBUMIN SERPL BCP-MCNC: 3.4 G/DL (ref 3.2–4.9)
ALBUMIN/GLOB SERPL: 1.3 G/DL
ALP SERPL-CCNC: 117 U/L (ref 30–99)
ALT SERPL-CCNC: 25 U/L (ref 2–50)
ANION GAP SERPL CALC-SCNC: 15 MMOL/L (ref 7–16)
AST SERPL-CCNC: 30 U/L (ref 12–45)
BASOPHILS # BLD AUTO: 0.5 % (ref 0–1.8)
BASOPHILS # BLD: 0.07 K/UL (ref 0–0.12)
BILIRUB SERPL-MCNC: 1 MG/DL (ref 0.1–1.5)
BUN SERPL-MCNC: 14 MG/DL (ref 8–22)
CALCIUM SERPL-MCNC: 8.3 MG/DL (ref 8.5–10.5)
CHLORIDE SERPL-SCNC: 104 MMOL/L (ref 96–112)
CO2 SERPL-SCNC: 18 MMOL/L (ref 20–33)
CREAT SERPL-MCNC: 0.83 MG/DL (ref 0.5–1.4)
EKG IMPRESSION: NORMAL
EOSINOPHIL # BLD AUTO: 0.07 K/UL (ref 0–0.51)
EOSINOPHIL NFR BLD: 0.5 % (ref 0–6.9)
ERYTHROCYTE [DISTWIDTH] IN BLOOD BY AUTOMATED COUNT: 45 FL (ref 35.9–50)
GLOBULIN SER CALC-MCNC: 2.6 G/DL (ref 1.9–3.5)
GLUCOSE SERPL-MCNC: 118 MG/DL (ref 65–99)
HCT VFR BLD AUTO: 26.2 % (ref 42–52)
HCT VFR BLD AUTO: 29.1 % (ref 42–52)
HGB BLD-MCNC: 8.8 G/DL (ref 14–18)
HGB BLD-MCNC: 9.7 G/DL (ref 14–18)
IMM GRANULOCYTES # BLD AUTO: 0.08 K/UL (ref 0–0.11)
IMM GRANULOCYTES NFR BLD AUTO: 0.6 % (ref 0–0.9)
LYMPHOCYTES # BLD AUTO: 1.24 K/UL (ref 1–4.8)
LYMPHOCYTES NFR BLD: 8.8 % (ref 22–41)
MAGNESIUM SERPL-MCNC: 2 MG/DL (ref 1.5–2.5)
MCH RBC QN AUTO: 33.8 PG (ref 27–33)
MCHC RBC AUTO-ENTMCNC: 33.6 G/DL (ref 33.7–35.3)
MCV RBC AUTO: 100.8 FL (ref 81.4–97.8)
MONOCYTES # BLD AUTO: 0.89 K/UL (ref 0–0.85)
MONOCYTES NFR BLD AUTO: 6.3 % (ref 0–13.4)
NEUTROPHILS # BLD AUTO: 11.69 K/UL (ref 1.82–7.42)
NEUTROPHILS NFR BLD: 83.3 % (ref 44–72)
NRBC # BLD AUTO: 0 K/UL
NRBC BLD-RTO: 0 /100 WBC
NT-PROBNP SERPL IA-MCNC: 1103 PG/ML (ref 0–125)
PLATELET # BLD AUTO: 435 K/UL (ref 164–446)
PMV BLD AUTO: 9 FL (ref 9–12.9)
POTASSIUM SERPL-SCNC: 3.5 MMOL/L (ref 3.6–5.5)
PROCALCITONIN SERPL-MCNC: 0.12 NG/ML
PROT SERPL-MCNC: 6 G/DL (ref 6–8.2)
RBC # BLD AUTO: 2.6 M/UL (ref 4.7–6.1)
SODIUM SERPL-SCNC: 137 MMOL/L (ref 135–145)
VIT B12 SERPL-MCNC: 1942 PG/ML (ref 211–911)
WBC # BLD AUTO: 14 K/UL (ref 4.8–10.8)

## 2022-02-01 PROCEDURE — 700101 HCHG RX REV CODE 250: Performed by: PHYSICAL MEDICINE & REHABILITATION

## 2022-02-01 PROCEDURE — 99232 SBSQ HOSP IP/OBS MODERATE 35: CPT | Performed by: PHYSICAL MEDICINE & REHABILITATION

## 2022-02-01 PROCEDURE — 700102 HCHG RX REV CODE 250 W/ 637 OVERRIDE(OP): Performed by: ORTHOPAEDIC SURGERY

## 2022-02-01 PROCEDURE — 93325 DOPPLER ECHO COLOR FLOW MAPG: CPT | Mod: 26 | Performed by: INTERNAL MEDICINE

## 2022-02-01 PROCEDURE — 700102 HCHG RX REV CODE 250 W/ 637 OVERRIDE(OP): Performed by: STUDENT IN AN ORGANIZED HEALTH CARE EDUCATION/TRAINING PROGRAM

## 2022-02-01 PROCEDURE — 93308 TTE F-UP OR LMTD: CPT

## 2022-02-01 PROCEDURE — 770001 HCHG ROOM/CARE - MED/SURG/GYN PRIV*

## 2022-02-01 PROCEDURE — 83880 ASSAY OF NATRIURETIC PEPTIDE: CPT

## 2022-02-01 PROCEDURE — 93010 ELECTROCARDIOGRAM REPORT: CPT | Performed by: INTERNAL MEDICINE

## 2022-02-01 PROCEDURE — C9113 INJ PANTOPRAZOLE SODIUM, VIA: HCPCS | Performed by: INTERNAL MEDICINE

## 2022-02-01 PROCEDURE — 93321 DOPPLER ECHO F-UP/LMTD STD: CPT | Mod: 26 | Performed by: INTERNAL MEDICINE

## 2022-02-01 PROCEDURE — 99232 SBSQ HOSP IP/OBS MODERATE 35: CPT | Performed by: HOSPITALIST

## 2022-02-01 PROCEDURE — 99232 SBSQ HOSP IP/OBS MODERATE 35: CPT | Performed by: SURGERY

## 2022-02-01 PROCEDURE — 85018 HEMOGLOBIN: CPT

## 2022-02-01 PROCEDURE — A9270 NON-COVERED ITEM OR SERVICE: HCPCS | Performed by: STUDENT IN AN ORGANIZED HEALTH CARE EDUCATION/TRAINING PROGRAM

## 2022-02-01 PROCEDURE — 80053 COMPREHEN METABOLIC PANEL: CPT

## 2022-02-01 PROCEDURE — 93005 ELECTROCARDIOGRAM TRACING: CPT | Performed by: HOSPITALIST

## 2022-02-01 PROCEDURE — 700102 HCHG RX REV CODE 250 W/ 637 OVERRIDE(OP): Performed by: INTERNAL MEDICINE

## 2022-02-01 PROCEDURE — 85014 HEMATOCRIT: CPT

## 2022-02-01 PROCEDURE — A9270 NON-COVERED ITEM OR SERVICE: HCPCS | Performed by: ORTHOPAEDIC SURGERY

## 2022-02-01 PROCEDURE — 36415 COLL VENOUS BLD VENIPUNCTURE: CPT

## 2022-02-01 PROCEDURE — 700111 HCHG RX REV CODE 636 W/ 250 OVERRIDE (IP): Performed by: INTERNAL MEDICINE

## 2022-02-01 PROCEDURE — 82607 VITAMIN B-12: CPT

## 2022-02-01 PROCEDURE — 83735 ASSAY OF MAGNESIUM: CPT

## 2022-02-01 PROCEDURE — 700102 HCHG RX REV CODE 250 W/ 637 OVERRIDE(OP): Performed by: PHYSICAL MEDICINE & REHABILITATION

## 2022-02-01 PROCEDURE — A9270 NON-COVERED ITEM OR SERVICE: HCPCS | Performed by: INTERNAL MEDICINE

## 2022-02-01 PROCEDURE — A9270 NON-COVERED ITEM OR SERVICE: HCPCS | Performed by: HOSPITALIST

## 2022-02-01 PROCEDURE — 85025 COMPLETE CBC W/AUTO DIFF WBC: CPT

## 2022-02-01 PROCEDURE — 99024 POSTOP FOLLOW-UP VISIT: CPT | Performed by: ORTHOPAEDIC SURGERY

## 2022-02-01 PROCEDURE — 700111 HCHG RX REV CODE 636 W/ 250 OVERRIDE (IP): Performed by: HOSPITALIST

## 2022-02-01 PROCEDURE — 700102 HCHG RX REV CODE 250 W/ 637 OVERRIDE(OP): Performed by: HOSPITALIST

## 2022-02-01 PROCEDURE — 84145 PROCALCITONIN (PCT): CPT

## 2022-02-01 PROCEDURE — 93308 TTE F-UP OR LMTD: CPT | Mod: 26 | Performed by: INTERNAL MEDICINE

## 2022-02-01 PROCEDURE — A9270 NON-COVERED ITEM OR SERVICE: HCPCS | Performed by: PHYSICAL MEDICINE & REHABILITATION

## 2022-02-01 RX ORDER — HALOPERIDOL 5 MG/ML
2 INJECTION INTRAMUSCULAR ONCE
Status: COMPLETED | OUTPATIENT
Start: 2022-02-01 | End: 2022-02-01

## 2022-02-01 RX ORDER — HALOPERIDOL 5 MG/ML
2 INJECTION INTRAMUSCULAR EVERY 4 HOURS PRN
Status: DISCONTINUED | OUTPATIENT
Start: 2022-02-01 | End: 2022-02-01

## 2022-02-01 RX ORDER — POTASSIUM CHLORIDE 20 MEQ/1
40 TABLET, EXTENDED RELEASE ORAL ONCE
Status: COMPLETED | OUTPATIENT
Start: 2022-02-01 | End: 2022-02-01

## 2022-02-01 RX ADMIN — ATORVASTATIN CALCIUM 40 MG: 40 TABLET, FILM COATED ORAL at 04:20

## 2022-02-01 RX ADMIN — HALOPERIDOL LACTATE 2 MG: 5 INJECTION, SOLUTION INTRAMUSCULAR at 10:01

## 2022-02-01 RX ADMIN — DOCUSATE SODIUM 100 MG: 100 CAPSULE, LIQUID FILLED ORAL at 18:36

## 2022-02-01 RX ADMIN — OXYCODONE 2.5 MG: 5 TABLET ORAL at 18:35

## 2022-02-01 RX ADMIN — FERROUS SULFATE TAB 325 MG (65 MG ELEMENTAL FE) 325 MG: 325 (65 FE) TAB at 09:18

## 2022-02-01 RX ADMIN — CALCIUM CARBONATE 500 MG: 500 TABLET, CHEWABLE ORAL at 04:20

## 2022-02-01 RX ADMIN — DOCUSATE SODIUM 100 MG: 100 CAPSULE, LIQUID FILLED ORAL at 04:20

## 2022-02-01 RX ADMIN — CYANOCOBALAMIN TAB 500 MCG 1000 MCG: 500 TAB at 04:20

## 2022-02-01 RX ADMIN — METOPROLOL TARTRATE 12.5 MG: 25 TABLET, FILM COATED ORAL at 14:24

## 2022-02-01 RX ADMIN — OXYCODONE 2.5 MG: 5 TABLET ORAL at 12:53

## 2022-02-01 RX ADMIN — FOLIC ACID 1 MG: 1 TABLET ORAL at 04:20

## 2022-02-01 RX ADMIN — POTASSIUM CHLORIDE 40 MEQ: 1500 TABLET, EXTENDED RELEASE ORAL at 09:17

## 2022-02-01 RX ADMIN — LIDOCAINE 1 PATCH: 50 PATCH TOPICAL at 12:36

## 2022-02-01 RX ADMIN — QUETIAPINE FUMARATE 75 MG: 25 TABLET ORAL at 18:35

## 2022-02-01 RX ADMIN — PANTOPRAZOLE SODIUM 40 MG: 40 INJECTION, POWDER, FOR SOLUTION INTRAVENOUS at 18:34

## 2022-02-01 RX ADMIN — PANTOPRAZOLE SODIUM 40 MG: 40 INJECTION, POWDER, FOR SOLUTION INTRAVENOUS at 04:28

## 2022-02-01 ASSESSMENT — ENCOUNTER SYMPTOMS
VOMITING: 0
DIARRHEA: 0
FOCAL WEAKNESS: 0
SHORTNESS OF BREATH: 0
ABDOMINAL PAIN: 1
DIZZINESS: 0
CONSTIPATION: 0
NAUSEA: 0
PALPITATIONS: 0
FLANK PAIN: 0
HEADACHES: 0
SORE THROAT: 0
NERVOUS/ANXIOUS: 1
FEVER: 0
COUGH: 0
BLOOD IN STOOL: 0

## 2022-02-01 ASSESSMENT — PAIN DESCRIPTION - PAIN TYPE
TYPE: ACUTE PAIN

## 2022-02-01 NOTE — CARE PLAN
The patient is Watcher - Medium risk of patient condition declining or worsening    Shift Goals  Clinical Goals: Safety  Patient Goals: Comfort  Family Goals: rito    Progress made toward(s) clinical / shift goals:  Taught pt 0-10 pain scale and non-pharmacological method of pain management, encouraged to verbalize when in pain. Administered PRN pain meds as needed.  Bed locked and in lowest position. Safety and fall precautions in place. Hourly rounding. Reoriented PRN. Call light and belongings within reach.      Patient is not progressing towards the following goals: Pt is A&O to self. Does not follow commands at times.

## 2022-02-01 NOTE — PROGRESS NOTES
4 Eyes Skin Assessment Completed by Remington, RN and Sujatha, Graduate Nurse.    Head WDL  Ears WDL  Nose WDL  Mouth WDL  Neck WDL  Breast/Chest WDL  Shoulder Blades Blanching  Spine WDL  (R) Arm/Elbow/Hand Bruising and Scab  (L) Arm/Elbow/Hand Bruising, and Scab  Abdomen WDL  Groin WDL  Scrotum/Coccyx/Buttocks WDL  (R) Leg Bruising and Incision covered by silver mepilex CDI  (L) Leg Scab and Bruising  (R) Heel/Foot/Toe Dry callus to toes  (L) Heel/Foot/Toe Dry callus to toes          Devices In Places Nasal Cannula      Interventions In Place Heel Mepilex, Pillows and Pressure Redistribution Mattress    Possible Skin Injury No    Pictures Uploaded Into Epic N/A  Wound Consult Placed N/A  RN Wound Prevention Protocol Ordered No

## 2022-02-01 NOTE — CARE PLAN
Problem: Nutritional:  Goal: Achieve adequate nutritional intake  Description: Patient will consume >50% of meals  Outcome: Not Met     See RD note

## 2022-02-01 NOTE — PROGRESS NOTES
Physical Medicine and Rehabilitation Consultation              Date of initial consultation: 1/24/2022  Requested by: Terri Matos MD  Consulting physician: Ezekiel Vizcarra D.O.  Reason for consultation: assessment of rehabilitation needs  LOS: 13 Day(s)    SUBJECTIVE:  Patient seen in room. No sitter present  GI: recorded as 1/30/2022  : continent, assisted patient with urinal as patient having difficulty doing it independently  Psych: Reports bad sleep. Slept only a couple of hours last night. Patient asking for beer today.  Neuro: remembers talking to Kaitlynn yesterday  MSK: Denies pain. Assisted patient to edge of bed in preparation for eating lunch - provided assistance with cueing and spotting    PMH:  Past Medical History:   Diagnosis Date   • Erectile dysfunction        PSH:  Past Surgical History:   Procedure Laterality Date   • ID UPPER GI ENDOSCOPY,DIAGNOSIS N/A 1/31/2022    Procedure: GASTROSCOPY;  Surgeon: Ti Foster M.D.;  Location: SURGERY SAME DAY [facility];  Service: Gastroenterology   • PB PARTIAL HIP REPLACEMENT Right 1/21/2022    Procedure: HEMIARTHROPLASTY, HIP;  Surgeon: Lloyd Roman M.D.;  Location: SURGERY [facility];  Service: Orthopedics   • HERNIA REPAIR     • VASECTOMY         FHX:  Family History   Problem Relation Age of Onset   • Other Mother    • Other Father        Medications:  Current Facility-Administered Medications   Medication Dose   • metoprolol tartrate (LOPRESSOR) tablet 12.5 mg  12.5 mg   • QUEtiapine (Seroquel) tablet 75 mg  75 mg   • calcium carbonate (TUMS) chewable tab 500 mg  500 mg   • pantoprazole (Protonix) injection 40 mg  40 mg   • lidocaine (LIDODERM) 5 % 1 Patch  1 Patch   • ferrous sulfate tablet 325 mg  325 mg   • oxyCODONE immediate-release (ROXICODONE) tablet 2.5 mg  2.5 mg    Or   • oxyCODONE immediate-release (ROXICODONE) tablet 5 mg  5 mg   • enoxaparin (LOVENOX) inj 40 mg  40 mg   • Pharmacy Consult Request ...Pain Management Review  "1 Each  1 Each   • ondansetron (ZOFRAN) syringe/vial injection 4 mg  4 mg   • diphenhydrAMINE (BENADRYL) injection 25 mg  25 mg   • haloperidol lactate (HALDOL) injection 1 mg  1 mg   • scopolamine (TRANSDERM-SCOP) patch 1 Patch  1 Patch   • docusate sodium (COLACE) capsule 100 mg  100 mg   • senna-docusate (PERICOLACE or SENOKOT S) 8.6-50 MG per tablet 1 Tablet  1 Tablet   • senna-docusate (PERICOLACE or SENOKOT S) 8.6-50 MG per tablet 1 Tablet  1 Tablet   • polyethylene glycol/lytes (MIRALAX) PACKET 1 Packet  1 Packet   • magnesium hydroxide (MILK OF MAGNESIA) suspension 30 mL  30 mL   • bisacodyl (DULCOLAX) suppository 10 mg  10 mg   • sodium phosphate (Fleet) enema 133 mL  1 Each   • acetaminophen (Tylenol) tablet 650 mg  650 mg   • labetalol (NORMODYNE/TRANDATE) injection 10 mg  10 mg   • atorvastatin (LIPITOR) tablet 40 mg  40 mg   • folic acid (FOLVITE) tablet 1 mg  1 mg   • cyanocobalamin (VITAMIN B-12) tablet 1,000 mcg  1,000 mcg       Allergies:  No Known Allergies    Physical Exam:  Vitals: /57   Pulse 98   Temp 37.8 °C (100 °F) (Temporal)   Resp 20   Ht 1.803 m (5' 11\")   Wt 90.7 kg (200 lb)   SpO2 95%   General: well-groomed in no acute distress, no visitors present  Eyes: no scleral icterus or conjunctival injection  Ears, nose, mouth and throat: moist oral mucosa  Cardiovascular: good peripheral perfusion  Respiratory: breathing comfortably without use of accessory muscles, +NC was desatting so required increase in supplemental oxygen  Gastrointestinal: soft, nontender, nondistended  Genitourinary: no indwelling leslie  Musculoskeletal: good symmetry in bilateral shoulders,  Skin: no wounds seen on exposed skin    Neurologic:  Mental status:  answers questions appropriately, follows commands  Speech: fluent, no aphasia or dysarthria  Tone: no spasticity noted  Psychiatric: flat affect  Hematologic/lymphatic/immunologic: ++IV access, no bruises seen on exposed skin    Labs: Reviewed and " significant for   Recent Labs     01/30/22  1849 01/30/22  2231 01/31/22  0258 01/31/22  0659 01/31/22  2151 02/01/22  0210 02/01/22  0604   RBC 2.62*  --  2.45*  --   --  2.60*  --    HEMOGLOBIN 9.0*   < > 8.6*   < > 8.9* 8.8* 9.7*   HEMATOCRIT 26.9*   < > 25.1*   < > 26.3* 26.2* 29.1*   PLATELETCT 379  --  381  --   --  435  --    PROTHROMBTM 15.9*  --   --   --   --   --   --    APTT 34.8  --   --   --   --   --   --    INR 1.31*  --   --   --   --   --   --     < > = values in this interval not displayed.     Recent Labs     01/30/22  1159 01/31/22  0258 02/01/22  0210   SODIUM 136 137 137   POTASSIUM 3.9 3.6 3.5*   CHLORIDE 105 105 104   CO2 18* 22 18*   GLUCOSE 122* 109* 118*   BUN 27* 21 14   CREATININE 0.75 0.81 0.83   CALCIUM 8.2* 8.2* 8.3*     Recent Results (from the past 24 hour(s))   HEMOGLOBIN AND HEMATOCRIT    Collection Time: 01/31/22  1:38 PM   Result Value Ref Range    Hemoglobin 8.3 (L) 14.0 - 18.0 g/dL    Hematocrit 24.4 (L) 42.0 - 52.0 %   HEMOGLOBIN AND HEMATOCRIT    Collection Time: 01/31/22  5:46 PM   Result Value Ref Range    Hemoglobin 8.9 (L) 14.0 - 18.0 g/dL    Hematocrit 26.0 (L) 42.0 - 52.0 %   HEMOGLOBIN AND HEMATOCRIT    Collection Time: 01/31/22  9:51 PM   Result Value Ref Range    Hemoglobin 8.9 (L) 14.0 - 18.0 g/dL    Hematocrit 26.3 (L) 42.0 - 52.0 %   Comp Metabolic Panel    Collection Time: 02/01/22  2:10 AM   Result Value Ref Range    Sodium 137 135 - 145 mmol/L    Potassium 3.5 (L) 3.6 - 5.5 mmol/L    Chloride 104 96 - 112 mmol/L    Co2 18 (L) 20 - 33 mmol/L    Anion Gap 15.0 7.0 - 16.0    Glucose 118 (H) 65 - 99 mg/dL    Bun 14 8 - 22 mg/dL    Creatinine 0.83 0.50 - 1.40 mg/dL    Calcium 8.3 (L) 8.5 - 10.5 mg/dL    AST(SGOT) 30 12 - 45 U/L    ALT(SGPT) 25 2 - 50 U/L    Alkaline Phosphatase 117 (H) 30 - 99 U/L    Total Bilirubin 1.0 0.1 - 1.5 mg/dL    Albumin 3.4 3.2 - 4.9 g/dL    Total Protein 6.0 6.0 - 8.2 g/dL    Globulin 2.6 1.9 - 3.5 g/dL    A-G Ratio 1.3 g/dL   CBC WITH  DIFFERENTIAL    Collection Time: 02/01/22  2:10 AM   Result Value Ref Range    WBC 14.0 (H) 4.8 - 10.8 K/uL    RBC 2.60 (L) 4.70 - 6.10 M/uL    Hemoglobin 8.8 (L) 14.0 - 18.0 g/dL    Hematocrit 26.2 (L) 42.0 - 52.0 %    .8 (H) 81.4 - 97.8 fL    MCH 33.8 (H) 27.0 - 33.0 pg    MCHC 33.6 (L) 33.7 - 35.3 g/dL    RDW 45.0 35.9 - 50.0 fL    Platelet Count 435 164 - 446 K/uL    MPV 9.0 9.0 - 12.9 fL    Neutrophils-Polys 83.30 (H) 44.00 - 72.00 %    Lymphocytes 8.80 (L) 22.00 - 41.00 %    Monocytes 6.30 0.00 - 13.40 %    Eosinophils 0.50 0.00 - 6.90 %    Basophils 0.50 0.00 - 1.80 %    Immature Granulocytes 0.60 0.00 - 0.90 %    Nucleated RBC 0.00 /100 WBC    Neutrophils (Absolute) 11.69 (H) 1.82 - 7.42 K/uL    Lymphs (Absolute) 1.24 1.00 - 4.80 K/uL    Monos (Absolute) 0.89 (H) 0.00 - 0.85 K/uL    Eos (Absolute) 0.07 0.00 - 0.51 K/uL    Baso (Absolute) 0.07 0.00 - 0.12 K/uL    Immature Granulocytes (abs) 0.08 0.00 - 0.11 K/uL    NRBC (Absolute) 0.00 K/uL   ESTIMATED GFR    Collection Time: 02/01/22  2:10 AM   Result Value Ref Range    GFR If African American >60 >60 mL/min/1.73 m 2    GFR If Non African American >60 >60 mL/min/1.73 m 2   PROCALCITONIN    Collection Time: 02/01/22  2:10 AM   Result Value Ref Range    Procalcitonin 0.12 <0.25 ng/mL   proBrain Natriuretic Peptide, NT    Collection Time: 02/01/22  2:10 AM   Result Value Ref Range    NT-proBNP 1103 (H) 0 - 125 pg/mL   MAGNESIUM    Collection Time: 02/01/22  2:10 AM   Result Value Ref Range    Magnesium 2.0 1.5 - 2.5 mg/dL   HEMOGLOBIN AND HEMATOCRIT    Collection Time: 02/01/22  6:04 AM   Result Value Ref Range    Hemoglobin 9.7 (L) 14.0 - 18.0 g/dL    Hematocrit 29.1 (L) 42.0 - 52.0 %   EKG    Collection Time: 02/01/22  9:24 AM   Result Value Ref Range    Report       [facility]    Test Date:  2022-02-01  Pt Name:    MISSY OLIVAS                Department: 131  MRN:        [id number]                      Room:       [facility]  Gender:     Male                          Technician: WENDY  :        [id number]                   Requested By:JYOTHI MILTON  Order #:    [id number]                    Reading MD: Lev Williamson MD    Measurements  Intervals                                Axis  Rate:       118                          P:  KS:                                      QRS:        -56  QRSD:       60                           T:          57  QT:         294  QTc:        412    Interpretive Statements  POOR QUALITY DATA, INTERPRETATION MAY BE AFFECTED  ATRIAL FIBRILLATION  ABNORMAL R-WAVE PROGRESSION, LATE TRANSITION  INFERIOR INFARCT, ACUTE (LCX)  ST DEPRESSION V1-V3, SUGGEST RECORDING POSTERIOR LEADS  ARTIFACT IN LEAD(S) II,III,aVR,aVL,aVF,V1,V2,V3,V4,V5,V6  Compared to ECG 2022 18:43:10  Atrial fibrillation now present  Electronically Elizabeth d On 2022 10:04:54 PST by Lev Williamson MD           ASSESSMENT:  IMPRESSION: The patient is a 89 y.o. male with a past medical history of coronary artery disease s/p stent, dyslipidemia, hard of hearing;  who presented on 2022 10:33 PM after fall and found to have Mildly comminuted, angulated and displaced transcervical right femoral neck fracture.     [facility] Code: 0008.11 - Orthopaedic Disorders: Status Post Unilateral Hip Fracture  -With acute secondary complications of: impaired ADLs and mobility, posttraumatic and postsurgical pain  -with chronic conditions of: hard of hearing  -and:     Medical Complexity:  Macrocytosis    RECOMMENDATIONS:    ##MSK  #Impaired ADLs and mobility: Agree with continuing OT/PT while admitted here.    Patient would benefit from aggressive OT and PT in acute inpatient rehabilitation. Currently, barrier to acute inpatient rehabilitation includes:  patient does not have a safe final discharge disposition    #Mildly comminuted, angulated and displaced transcervical right femoral neck fracture s/p Open treatment of right femoral fracture, proximal end, neck with prosthetic  replacement on 1/21/2022 by Dr. Lloyd Roman MD.   Weight bearing as tolerated with posterior hip precautions.    #Posttraumatic pain, acute, controlled  #Postsurgical pain, acute, controlled  Continue oxycodone 2.5-5mg Q3hr PRN pain, acetaminophen 650mg Q6hr PRN pain  Ordered lidocaine 5% patch to right hip - avoid incision area    ##PSYCH  #Hallucinations, improving  #Impaired sleep  Likely multifactorial  Continue seroquel 75mg QHS - had first higher dose last night and did not sleep well - recommend same dose tonight - highly recommend good sleep hygiene as below    Recommend good sleep hygiene with lights on/window shade up during the day, out of bed and in chair for meals, and in bed with lights off and minimal interruptions at night.    ##  Continent    ##SKIN  #Incision site  Routine wound care    DVT chemoprophlaxis: lovenox 40mg daily  Code: full resuscitation    Thank you for allowing us to participate in the care of this patient. Physiatry will continue to follow and provide recommendations, as needed.    Patient was seen for 27 minutes on unit/floor of which > 50% of time was spent on counseling and coordination of care regarding the above, including prognosis, risk reduction, benefits of treatment, and options for next stage of care.    Ezekiel Vizcarra D.O.   Physical Medicine and Rehabilitation     I have performed a physical exam and reviewed and updated history and plan today (2/1/2022).     Please note that this dictation was created using voice recognition software. I have made every reasonable attempt to correct obvious errors, but there may be errors of grammar and possibly content that I did not discover before finalizing the note.

## 2022-02-01 NOTE — PROGRESS NOTES
"   Orthopaedic Progress Note    Interval changes:  Patient doing well  Dressing CDI  RLE dressings CDI  Cleared for DC to SNF by ortho pending medicine clearance    ROS - Patient denies any new issues but is confused.  Pain well controlled.    BP (!) 97/52   Pulse 93   Temp 36.1 °C (97 °F)   Resp 20   Ht 1.803 m (5' 11\")   Wt 90.7 kg (200 lb)   SpO2 94%       Patient seen and examined  No acute distress  Breathing non labored  RRR  RLE dressings CDI, DNVI, moves all toes, cap refill <2 sec.     Recent Labs     01/30/22  1159 01/30/22  1159 01/30/22  1849 01/30/22  2231 01/31/22  0258 01/31/22  0659 01/31/22  1338   WBC 14.4*  --  12.7*  --  11.9*  --   --    RBC 2.70*  --  2.62*  --  2.45*  --   --    HEMOGLOBIN 9.4*   < > 9.0*   < > 8.6* 8.9* 8.3*   HEMATOCRIT 27.9*   < > 26.9*   < > 25.1* 25.8* 24.4*   .3*  --  102.7*  --  102.4*  --   --    MCH 34.8*  --  34.4*  --  35.1*  --   --    MCHC 33.7  --  33.5*  --  34.3  --   --    RDW 44.9  --  44.4  --  43.8  --   --    PLATELETCT 387  --  379  --  381  --   --    MPV 9.8  --  9.0  --  9.3  --   --     < > = values in this interval not displayed.       Active Hospital Problems    Diagnosis    • Abnormal CT scan, gallbladder [R93.2]      Priority: Medium   • Epigastric pain [R10.13]    • DNR (do not resuscitate) [Z66]    • Vasovagal syncope [R55]    • Encephalopathy [G93.40]    • Acute blood loss anemia [D62]    • CHAPITO (acute kidney injury) (HCC) [N17.9]    • Stage 3a chronic kidney disease (HCC) [N18.31]    • Elevated MCV [R71.8]    • CAD (coronary artery disease), native coronary artery [I25.10]    • Closed right hip fracture, initial encounter ([facility]) [S72.001A]    • Mixed hyperlipidemia [E78.2]    • Insomnia [G47.00]        Assessment/Plan:  Patient doing well  Dressing CDI  RLE dressings CDI  POD#10 S/P Open treatment of right femoral fracture, proximal end, neck with prosthetic replacement  Wt bearing status - WBAT  Wound care/Drains - Dressings to be " changed every other day by nursing  Future Procedures - none planned   Lovenox: Start 1/22, Duration-until ambulatory > 150'  Sutures/Staples out- 14 days post operatively  PT/OT-initiated  Antibiotics:  Perioperative completed  DVT Prophylaxis- TEDS/SCDs/Foot pumps  Raya-none  Case Coordination for Discharge Planning - Disposition SNF     I have performed a physical exam and reviewed and updated ROS and Plan today (1/31). In review of yesterday's note (1/30), there are no changes except as documented above.

## 2022-02-01 NOTE — PROGRESS NOTES
Gastroenterology Progress Note     Author: Rufina Anderson M.D.   Date & Time Created: 2/1/2022 10:02 AM    Chief Complaint:  GI bleed, melena    Interval History:  Patient is a 89 y/o M with a PMH significant for coronary artery disease, stage IIIa CKD, hyperlipidemia, encephalopathy, who presented to the hospital on 1/19/2022 for mechanical ground-level fall status post hip fracture repair.  Patient had nausea and vomiting on 1/30/2022.  He underwent EGD on 1/31/2022 which showed 3 large duodenal ulcers with clean bases.  HIDA scan was performed on 1/31/2022 which did not show any evidence of biliary obstruction, less likely acute cholecystitis.    Overnight, the patient had some delirium. This morning, he did not wish to speak to me, talking into his phone the entire time that he was able to do everything by himself.    Upon reassessment of the patient in the afternoon, patient stated he felt weak.    Review of Systems:  Review of Systems   Reason unable to perform ROS: mental acuity.         Current Facility-Administered Medications:   •  haloperidol lactate (HALDOL) injection 2 mg, 2 mg, Intramuscular, Once, Ethan Morales M.D.  •  QUEtiapine (Seroquel) tablet 75 mg, 75 mg, Oral, Nightly, Ezekiel Vizcarra D.O., 75 mg at 01/31/22 1916  •  calcium carbonate (TUMS) chewable tab 500 mg, 500 mg, Oral, DAILY, Jocelin Merino M.D., 500 mg at 02/01/22 0420  •  pantoprazole (Protonix) injection 40 mg, 40 mg, Intravenous, BID, Adelina Butler M.D., 40 mg at 02/01/22 0428  •  lidocaine (LIDODERM) 5 % 1 Patch, 1 Patch, Transdermal, Q24HR, Ezekiel Vizcarra D.O., 1 Patch at 01/30/22 1108  •  ferrous sulfate tablet 325 mg, 325 mg, Oral, QDAY with Breakfast, Terri Matos M.D., 325 mg at 02/01/22 0918  •  oxyCODONE immediate-release (ROXICODONE) tablet 2.5 mg, 2.5 mg, Oral, Q3HRS PRN **OR** oxyCODONE immediate-release (ROXICODONE) tablet 5 mg, 5 mg, Oral, Q3HRS PRN **OR** [DISCONTINUED] HYDROmorphone (Dilaudid)  injection 0.5 mg, 0.5 mg, Intravenous, Q3HRS PRN, Lloyd Roman M.D.  •  [Held by provider] enoxaparin (LOVENOX) inj 40 mg, 40 mg, Subcutaneous, QDAY, Lloyd Roman M.D., 40 mg at 22 0426  •  Pharmacy Consult Request ...Pain Management Review 1 Each, 1 Each, Other, PHARMACY TO DOSE, Lloyd Roman M.D.  •  ondansetron (ZOFRAN) syringe/vial injection 4 mg, 4 mg, Intravenous, Q4HRS PRN, Lloyd Roman M.D., 4 mg at 22 1110  •  diphenhydrAMINE (BENADRYL) injection 25 mg, 25 mg, Intravenous, Q6HRS PRN, Lloyd Roman M.D.  •  haloperidol lactate (HALDOL) injection 1 mg, 1 mg, Intravenous, Q6HRS PRN, Lloyd Roman M.D.  •  scopolamine (TRANSDERM-SCOP) patch 1 Patch, 1 Patch, Transdermal, Q72HRS PRN, Lloyd Roman M.D.  •  docusate sodium (COLACE) capsule 100 mg, 100 mg, Oral, BID, Lloyd Roman M.D., 100 mg at 22 0420  •  senna-docusate (PERICOLACE or SENOKOT S) 8.6-50 MG per tablet 1 Tablet, 1 Tablet, Oral, Nightly, Lloyd Roman M.D., 1 Tablet at 22  •  senna-docusate (PERICOLACE or SENOKOT S) 8.6-50 MG per tablet 1 Tablet, 1 Tablet, Oral, Q24HRS PRN, Lloyd Roman M.D.  •  polyethylene glycol/lytes (MIRALAX) PACKET 1 Packet, 1 Packet, Oral, BID PRN, Lloyd Roman M.D., 1 Packet at 22 0431  •  magnesium hydroxide (MILK OF MAGNESIA) suspension 30 mL, 30 mL, Oral, QDAY PRN, Lloyd Roman M.D., 30 mL at 22 0505  •  bisacodyl (DULCOLAX) suppository 10 mg, 10 mg, Rectal, Q24HRS PRN, Lloyd Roman M.D.  •  sodium phosphate (Fleet) enema 133 mL, 1 Each, Rectal, Once PRN, Lloyd Roman M.D.  •  [] acetaminophen (Tylenol) tablet 650 mg, 650 mg, Oral, Q6HRS, 650 mg at 22 2346 **FOLLOWED BY** acetaminophen (Tylenol) tablet 650 mg, 650 mg, Oral, Q6HRS PRN, Lloyd Roman M.D., 650 mg at 22 2345  •  labetalol (NORMODYNE/TRANDATE) injection 10 mg, 10 mg, Intravenous, Q4HRS PRN, Govind Reyes M.D.  •   atorvastatin (LIPITOR) tablet 40 mg, 40 mg, Oral, DAILY, Govind Reyes M.D., 40 mg at 02/01/22 0420  •  folic acid (FOLVITE) tablet 1 mg, 1 mg, Oral, DAILY, Luis F Guillen D.O., 1 mg at 02/01/22 0420  •  cyanocobalamin (VITAMIN B-12) tablet 1,000 mcg, 1,000 mcg, Oral, DAILY, Luis F Guillen D.O., 1,000 mcg at 02/01/22 0420     Physical Exam:  Vitals:    02/01/22 0728   BP: 103/57   Pulse: 98   Resp: 20   Temp: 37.8 °C (100 °F)   SpO2: 95%        Physical Exam  Vitals (Limited exam due to lack of patient cooperation) and nursing note reviewed. Exam conducted with a chaperone present (RN present).   Constitutional:       General: He is in acute distress.      Appearance: He is ill-appearing. He is not toxic-appearing or diaphoretic.   HENT:      Head: Normocephalic and atraumatic.      Right Ear: External ear normal.      Left Ear: External ear normal.      Nose: Nose normal. No congestion or rhinorrhea.      Mouth/Throat:      Mouth: Mucous membranes are dry.      Pharynx: No oropharyngeal exudate.   Eyes:      General:         Right eye: No discharge.         Left eye: No discharge.      Extraocular Movements: Extraocular movements intact.      Pupils: Pupils are equal, round, and reactive to light.   Pulmonary:      Comments: Unlabored breathing  Musculoskeletal:         General: Normal range of motion.      Cervical back: Normal range of motion.      Comments: Absent toenails on R 2nd and 3rd digits   Skin:     General: Skin is warm and dry.      Coloration: Skin is pale.   Neurological:      Mental Status: He is disoriented.   Psychiatric:         Attention and Perception: He is inattentive.         Mood and Affect: Mood is anxious.         Speech: Speech is rapid and pressured.         Behavior: Behavior is agitated.         Labs:          Recent Labs     01/30/22  1159 01/31/22  0258 02/01/22  0210   SODIUM 136 137 137   POTASSIUM 3.9 3.6 3.5*   CHLORIDE 105 105 104   CO2 18* 22 18*   BUN 27* 21 14    CREATININE 0.75 0.81 0.83   CALCIUM 8.2* 8.2* 8.3*     Recent Labs     01/30/22  1159 01/31/22  0258 02/01/22  0210   ALTSGPT  --  24 25   ASTSGOT  --  20 30   ALKPHOSPHAT  --  127* 117*   TBILIRUBIN  --  0.8 1.0   GLUCOSE 122* 109* 118*     Recent Labs     01/30/22  1849 01/30/22  2231 01/31/22  0258 01/31/22  0659 01/31/22  2151 02/01/22  0210 02/01/22  0604   RBC 2.62*  --  2.45*  --   --  2.60*  --    HEMOGLOBIN 9.0*   < > 8.6*   < > 8.9* 8.8* 9.7*   HEMATOCRIT 26.9*   < > 25.1*   < > 26.3* 26.2* 29.1*   PLATELETCT 379  --  381  --   --  435  --    PROTHROMBTM 15.9*  --   --   --   --   --   --    APTT 34.8  --   --   --   --   --   --    INR 1.31*  --   --   --   --   --   --     < > = values in this interval not displayed.     Recent Labs     01/30/22 1849 01/31/22 0258 02/01/22 0210   WBC 12.7* 11.9* 14.0*   NEUTSPOLYS 81.80* 80.20* 83.30*   LYMPHOCYTES 9.80* 10.20* 8.80*   MONOCYTES 6.90 7.60 6.30   EOSINOPHILS 0.30 0.50 0.50   BASOPHILS 0.50 0.30 0.50   ASTSGOT  --  20 30   ALTSGPT  --  24 25   ALKPHOSPHAT  --  127* 117*   TBILIRUBIN  --  0.8 1.0       Imaging:  CT-ABDOMEN-PELVIS WITH   Final Result      1.  Wall thickening of the duodenal bulb and second portion of the duodenum with surrounding inflammation can be seen in duodenitis/peptic ulcer disease.   2.  Gallbladder is distended with pericholecystic fluid. This can be seen in the setting of cholecystitis.   3.  Atherosclerotic plaque within an ectatic aorta.   4.  Colonic diverticulosis.   5.  2.1 x 2.5 cm fluid collection overlying the right hip arthroplasty is likely a postsurgical evolving hematoma/seroma.   6.  Small hiatal hernia.   7.  Small right pleural effusion and bibasilar atelectasis.   8.  Cardiomegaly.   9.  Linear hypodensity in the inferior spleen could represent a small area of splenic infarction.            DX-CHEST-PORTABLE (1 VIEW)   Final Result      No evidence of acute cardiopulmonary process.      DX-CHEST-LIMITED (1  VIEW)   Final Result      1.  Cardiomegaly.   2.  Slight interstitial prominence. No consolidation or pleural effusion.      DX-FEMUR-2+ RIGHT   Final Result      Mildly angulated and displaced transcervical right femoral neck fracture.      DX-PELVIS-1 OR 2 VIEWS   Final Result      Mildly displaced transcervical right femoral neck fracture.      CT-PELVIS W/O PLUS RECONS   Final Result      Mildly comminuted, angulated and displaced transcervical right femoral neck fracture.      CT-HEAD W/O   Final Result      1.  Chronic ischemic changes.   2.  No acute intracranial abnormality.         CT-CSPINE WITHOUT PLUS RECONS   Final Result      1.  No acute fracture or dislocation of the cervical spine.   2.  Mild degeneration.   3.  Severe carotid calcified plaque.      NM-HEPATOBILIARY SCAN    (Results Pending)   EC-ECHOCARDIOGRAM COMPLETE W/O CONT    (Results Pending)        Assessment:  89 y/o M with a PMH significant for coronary artery disease, stage IIIa CKD, hyperlipidemia, encephalopathy, who presented to the hospital on 1/19/2022 for mechanical ground-level fall status post hip fracture repair.  Status-post EGD 1/31/2022 which showed 3 large duodenal ulcers with clean bases. HIDA scan performed on 1/31/2022 did not show any evidence of biliary obstruction, less likely acute cholecystitis.      Plan:    #NSAID-induced duodenopathy, 3 large duodenal ulcers  #Debility s/p R femoral fracture  #S/p EGD 1/31/2022  -Continue PPI tx, can advance to PO when tolerated  -Avoid NSAIDS  -Advance diet as tolerated  -PT/OT  -Follow up on H pylori stool antigen - I have ordered  -Seizure, fall, aspiration precautions  -PRN anti-emetics and bowel protocol  -Monitor stools    #Sundowning  #Delirium  -do not disturb patient (vitals or lab draws) between the hours of 10 PM and 6 AM.  -ideally the patient should not sleep during the day and we should avoid day time naps.   -up in chair for meals  -ambulate at least three times  daily, as able  -watch for constipation  -timed voiding - ask patient is she would like to go to the bathroom q 2-3 hours, except during the do not disturb hours.   -remove all necessary lines (central lines, peripheral IVs, feeding tubes, leslie catheters)  -unless patient has shown harm to self or others I would recommend against use of restraints - either chemical or physical (antipsychotics)   -minimize polypharmacy, do not dose medication during sleep hours    Rufina Anderson MD, MPH  [facility], PGY-2

## 2022-02-01 NOTE — CARE PLAN
The patient is Stable - Low risk of patient condition declining or worsening    Shift Goals  Clinical Goals: safety  Patient Goals: comfort  Family Goals: rito    Progress made toward(s) clinical / shift goals:  yes    Problem: Pain - Standard  Goal: Alleviation of pain or a reduction in pain to the patient’s comfort goal  Outcome: Progressing     Problem: Knowledge Deficit - Standard  Goal: Patient and family/care givers will demonstrate understanding of plan of care, disease process/condition, diagnostic tests and medications  Outcome: Progressing       Patient is not progressing towards the following goals:

## 2022-02-01 NOTE — DISCHARGE PLANNING
Agency/Facility Name: [facility]  Spoke To: Ximena  Outcome: Insurance auth has been received.  [facility] does have a bed available today.

## 2022-02-01 NOTE — HOSPITAL COURSE
This is a 89-year-old male with a past medical history significant for dyslipidemia, CKD stage III presented to ER on 1/19/2022 after she had a ground-level fall landing on the right side.  Denies loss of consciousness, hitting her head involved and bladder incontinence.    CT head, C-spine no acute abnormality.  CT pelvis showed angulated and displaced transcervical right femoral neck fracture.  Orthopedic surgery was consulted patient underwent open treatment of right femoral fracture, proximal and neck with prosthetic replacement by Dr. Lloyd Kim on1/21/2022.    PT/OT has evaluated the patient, recommended postacute    Patient underwent endoscopy as he was complaining of epigastric pain, found to have Three large duodenal ulcers, all clean based, all   without stigmata for high risk bleed.  Omeprazole 40 mg po bid    Hospital course was comp located by nausea and vomiting CT scan showed possible cholecystitis discussed with Dr. Aguiar of surgery recommended HIDA.  HIDA completed pending results    Also her hospital course was complicated with syncopal episode on 1/20/2022 after having bowel movement likely secondary to vasovagal.  Patient evaluation ordered, echocardiogram ordered    Hospital course was complicated with intermittent agitation, improved now      Hospital course was complicated with acute renal failure improved with IV fluid likely secondary to postoperative hypotension and acute blood loss anemia.    Hospital course was complicated with acute blood loss anemia GI consulted, 3 large duodenal ulcer, will continue PPI twice daily

## 2022-02-01 NOTE — PROGRESS NOTES
Hospital Medicine Daily Progress Note    Date of Service  2/1/2022    Chief Complaint  Navin Suazo is a 89 y.o. male admitted 1/19/2022 with right hip pain    Hospital Course  This is a 89-year-old male with a past medical history significant for dyslipidemia, CKD stage III presented to ER on 1/19/2022 after she had a ground-level fall landing on the right side.  Denies loss of consciousness, hitting her head involved and bladder incontinence.    CT head, C-spine no acute abnormality.  CT pelvis showed angulated and displaced transcervical right femoral neck fracture.  Orthopedic surgery was consulted patient underwent open treatment of right femoral fracture, proximal and neck with prosthetic replacement by Dr. Lloyd Kim on 1/21/2022. PT/OT has evaluated the patient, recommended postacute.    Patient underwent endoscopy as he was complaining of epigastric pain, found to have Three large duodenal ulcers, all clean based, all   without stigmata for high risk bleed. willOmeprazole 40 mg po bid    Hospital course was comp located by nausea and vomiting CT scan showed possible cholecystitis discussed with Dr. Aguiar of surgery recommended HIDA.  HIDA  Scan reviewed and no evidence of acute cholecystitis .    Also her hospital course was complicated with syncopal episode on 1/28/2022 after having bowel movement likely secondary to vasovagal.  Echo ordered.  EKG was obtained, there was a concern of A. fib, discussed with cardiology who stated that the patient has sinus rhythm with multiple PACs.  Echocardiogram reviewed    Hospital course was complicated with intermittent agitation, improved now      Hospital course was complicated with acute renal failure improved with IV fluid likely secondary to postoperative hypotension and acute blood loss anemia.  Interval Problem Update  No acute events overnight, patient heart rate is noted to be regular, EKG was obtained, noted to have sinus rhythm with multiple PACs.   Echocardiogram reviewed, unremarkable.  Patient denied any lightheadedness.  --Potassium 3.5, will replete with 40 mEq of potassium.  Magnesium at 2.0.  --Noted to have elevated WBC, procalcitonin normal, no need of antibiotics  --Monitor hemoglobin hematocrit, if less than 7 will transfuse.  --Patient underwent endoscopy on 1/31/2022 and found to have duodenal ulcer, GI commended continue omeprazole 40 mg p.o. twice daily.    I have personally seen and examined the patient at bedside. I discussed the plan of care with patient, family, bedside RN, charge RN,  and pharmacy.    Consultants/Specialty  orthopedics    Code Status  DNAR/DNI    Disposition  Patient is not medically cleared for discharge.   Anticipate discharge to to skilled nursing facility vs inpatient rehab.  I have placed the appropriate orders for post-discharge needs.    Review of Systems  Review of Systems   Constitutional: Positive for malaise/fatigue. Negative for fever.   HENT: Negative for sore throat.    Respiratory: Negative for cough and shortness of breath.    Cardiovascular: Negative for chest pain, palpitations and leg swelling.   Gastrointestinal: Positive for abdominal pain. Negative for blood in stool, constipation, diarrhea, nausea and vomiting.   Genitourinary: Positive for frequency. Negative for dysuria and flank pain.   Musculoskeletal: Positive for joint pain.   Neurological: Negative for dizziness, focal weakness and headaches.   Psychiatric/Behavioral: The patient is nervous/anxious.         Physical Exam  Temp:  [36.4 °C (97.6 °F)-37.8 °C (100 °F)] 37.8 °C (100 °F)  Pulse:  [] 98  Resp:  [20] 20  BP: (103-144)/(57-88) 103/57  SpO2:  [95 %-98 %] 95 %    Physical Exam  Vitals and nursing note reviewed.   Constitutional:       General: He is not in acute distress.     Appearance: Normal appearance.   HENT:      Head: Normocephalic and atraumatic.      Right Ear: External ear normal.      Left Ear: External ear  normal.      Nose: Nose normal. No congestion.      Mouth/Throat:      Mouth: Mucous membranes are moist.      Pharynx: Oropharynx is clear.   Eyes:      General: No scleral icterus.     Conjunctiva/sclera: Conjunctivae normal.   Cardiovascular:      Rate and Rhythm: Normal rate and regular rhythm.      Heart sounds: Normal heart sounds.   Pulmonary:      Effort: No respiratory distress.      Breath sounds: Normal breath sounds. No wheezing or rales.   Abdominal:      General: Abdomen is flat. Bowel sounds are normal. There is no distension.      Tenderness: There is abdominal tenderness. There is no guarding.   Musculoskeletal:      Cervical back: Neck supple. No rigidity.      Right lower leg: No edema.      Left lower leg: No edema.      Comments: Right hip surgical dressing in place, c/d/i without strikethrough. Surrounding skin nonerythematous, nonedematous, without ecchymosis.   Skin:     General: Skin is warm and dry.   Neurological:      Mental Status: He is alert and oriented to person, place, and time.      Cranial Nerves: No cranial nerve deficit.   Psychiatric:         Mood and Affect: Mood normal.         Thought Content: Thought content normal.         Judgment: Judgment normal.         Fluids    Intake/Output Summary (Last 24 hours) at 2/1/2022 1319  Last data filed at 1/31/2022 1800  Gross per 24 hour   Intake --   Output 450 ml   Net -450 ml       Laboratory  Recent Labs     01/30/22  1849 01/30/22  2231 01/31/22  0258 01/31/22  0659 01/31/22  2151 02/01/22  0210 02/01/22  0604   WBC 12.7*  --  11.9*  --   --  14.0*  --    RBC 2.62*  --  2.45*  --   --  2.60*  --    HEMOGLOBIN 9.0*   < > 8.6*   < > 8.9* 8.8* 9.7*   HEMATOCRIT 26.9*   < > 25.1*   < > 26.3* 26.2* 29.1*   .7*  --  102.4*  --   --  100.8*  --    MCH 34.4*  --  35.1*  --   --  33.8*  --    MCHC 33.5*  --  34.3  --   --  33.6*  --    RDW 44.4  --  43.8  --   --  45.0  --    PLATELETCT 379  --  381  --   --  435  --    MPV 9.0  --   9.3  --   --  9.0  --     < > = values in this interval not displayed.     Recent Labs     01/30/22  1159 01/31/22  0258 02/01/22  0210   SODIUM 136 137 137   POTASSIUM 3.9 3.6 3.5*   CHLORIDE 105 105 104   CO2 18* 22 18*   GLUCOSE 122* 109* 118*   BUN 27* 21 14   CREATININE 0.75 0.81 0.83   CALCIUM 8.2* 8.2* 8.3*     Recent Labs     01/30/22  1849   APTT 34.8   INR 1.31*               Imaging  EC-ECHOCARDIOGRAM LTD W/O CONT   Final Result      NM-HEPATOBILIARY SCAN   Final Result      1.  Delayed visualization of the gallbladder after morphine administration does not favor acute cholecystitis.   2.  No biliary obstruction.      CT-ABDOMEN-PELVIS WITH   Final Result      1.  Wall thickening of the duodenal bulb and second portion of the duodenum with surrounding inflammation can be seen in duodenitis/peptic ulcer disease.   2.  Gallbladder is distended with pericholecystic fluid. This can be seen in the setting of cholecystitis.   3.  Atherosclerotic plaque within an ectatic aorta.   4.  Colonic diverticulosis.   5.  2.1 x 2.5 cm fluid collection overlying the right hip arthroplasty is likely a postsurgical evolving hematoma/seroma.   6.  Small hiatal hernia.   7.  Small right pleural effusion and bibasilar atelectasis.   8.  Cardiomegaly.   9.  Linear hypodensity in the inferior spleen could represent a small area of splenic infarction.            DX-CHEST-PORTABLE (1 VIEW)   Final Result      No evidence of acute cardiopulmonary process.      DX-CHEST-LIMITED (1 VIEW)   Final Result      1.  Cardiomegaly.   2.  Slight interstitial prominence. No consolidation or pleural effusion.      DX-FEMUR-2+ RIGHT   Final Result      Mildly angulated and displaced transcervical right femoral neck fracture.      DX-PELVIS-1 OR 2 VIEWS   Final Result      Mildly displaced transcervical right femoral neck fracture.      CT-PELVIS W/O PLUS RECONS   Final Result      Mildly comminuted, angulated and displaced transcervical right  femoral neck fracture.      CT-HEAD W/O   Final Result      1.  Chronic ischemic changes.   2.  No acute intracranial abnormality.         CT-CSPINE WITHOUT PLUS RECONS   Final Result      1.  No acute fracture or dislocation of the cervical spine.   2.  Mild degeneration.   3.  Severe carotid calcified plaque.           Assessment/Plan  * Closed right hip fracture, initial encounter (HCC)- (present on admission)  Assessment & Plan  Mechanical fall down some stairs prior to admission.  CT pelvis shows right femoral neck fracture  S/p right hip fx repair on 1/21.   PT/OT has evaluate the patient, recommend postacute    Abnormal CT scan, gallbladder- (present on admission)  Assessment & Plan  HIDA scan unremarkable, surgery signed off.     Epigastric pain- (present on admission)  Assessment & Plan  Secondary to peptic ulcer disease.  HIDA scan unremarkable.  Patient epigastric pain has improved, continue PPI twice daily    Vasovagal syncope  Assessment & Plan  Likely secondary to vasovagal syncope as event happened after bowel movement.  Orthostatic vital sign pending.  EKG ordered, noted to have PAC  Echocardiogram reviewed    DNR (do not resuscitate)- (present on admission)  Assessment & Plan  Discussion as per above.   1/28: Discussed with daughter and wife about code status and both agreed with DNAR/DNI. Daughter stated that her father is frail, and would not survive resuscitation, it will just cause more pain then good.   Patient was of sound mind and voluntarily participated in the conversation.  Daughter Jessika and wife were present for the conversation.  I discussed advance care planning face to face with the patient and family for at least 12 minutes, including diagnosis, prognosis, plan of care, risks and benefits of any therapies , code status.         Encephalopathy- (present on admission)  Assessment & Plan  TSH, vitamin [id number] normal UA unremarkable  My evaluation, patient is alert and oriented x2-4,  answering questions appropriately.  Likely secondary to hospital delirium    CHAPITO (acute kidney injury) (HCC)  Assessment & Plan  Resolved likely secondary to prerenal etiology    Acute blood loss anemia  Assessment & Plan  Likely secondary to acute blood loss anemia from surgery versus GI bleed.  Endoscopy was obtained, noted to have duodenal ulcer.  We will continue PPI twice daily  Avoid NSAID    CAD (coronary artery disease), native coronary artery- (present on admission)  Assessment & Plan  From chart review patient appears of had CAD with a stent placed in the past.  Unclear when this was placed however greater than 1 year ago.  Continue home atorvastatin.  Continue home ASA on discharge.    Elevated MCV- (present on admission)  Assessment & Plan  .9.  Likely secondary to ethanol abuse, TSH normal,    Stage 3a chronic kidney disease (HCC)- (present on admission)  Assessment & Plan  History of CKD, eGFR ~60  Avoid nephrotoxic agents  Monitor renal function    Mixed hyperlipidemia- (present on admission)  Assessment & Plan  Continue statin    Insomnia- (present on admission)  Assessment & Plan  Improving per patient        VTE prophylaxis: Lovenox

## 2022-02-01 NOTE — PROGRESS NOTES
"    DATE: 2/1/2022    Hospital Day 14     INTERVAL EVENTS:  Hida scan complete  Denies abdominal pain    REVIEW OF SYSTEMS: Unable to to be obtained due to patient's mental status    PHYSICAL EXAMINATION:      Constitutional:     Vital Signs: /57   Pulse 98   Temp 37.8 °C (100 °F) (Temporal)   Resp 20   Ht 1.803 m (5' 11\")   Wt 90.7 kg (200 lb)   SpO2 95%    General Appearance: The patient is a anxious appearing elderly man in no critical distress.  HEENT:    No significant external craniofacial trauma. The pupils are equal, round, and reactive to light bilaterally. The extraocular muscles are intact bilaterally.  Neck:    Normal range of motion.  Respiratory:   Inspection: Unlabored respirations, no intercostal retractions, paradoxical motion, or accessory muscle use.   Palpation:  The chest is nontender.   Auscultation: clear to auscultation.  Cardiovascular:   Inspection: The skin is warm.  Auscultation: Regular rate and rhythm.   Peripheral Pulses: Normal.   Abdomen:   Inspection: Abdominal inspection reveals no abnormalities   Palpation: Palpation is remarkable for no significant tenderness, guarding, or peritoneal findings.     Extremities are without clubbing cyanosis or edema    Skin:    Examination of the skin reveals no jaundice  Neurologic:    Brittany Coma Scale (GCS) 14.    Neurologic examination reveals no focal deficits noted.  Psychiatric:   Cannot be assessed.    LABORATORY VALUES:   Recent Labs     01/30/22  1849 01/30/22  2231 01/31/22  0258 01/31/22  0659 01/31/22  2151 02/01/22  0210 02/01/22  0604   WBC 12.7*  --  11.9*  --   --  14.0*  --    RBC 2.62*  --  2.45*  --   --  2.60*  --    HEMOGLOBIN 9.0*   < > 8.6*   < > 8.9* 8.8* 9.7*   HEMATOCRIT 26.9*   < > 25.1*   < > 26.3* 26.2* 29.1*   .7*  --  102.4*  --   --  100.8*  --    MCH 34.4*  --  35.1*  --   --  33.8*  --    MCHC 33.5*  --  34.3  --   --  33.6*  --    RDW 44.4  --  43.8  --   --  45.0  --    PLATELETCT 379  --  " 381  --   --  435  --    MPV 9.0  --  9.3  --   --  9.0  --     < > = values in this interval not displayed.     Recent Labs     01/30/22  1159 01/31/22  0258 02/01/22 0210   SODIUM 136 137 137   POTASSIUM 3.9 3.6 3.5*   CHLORIDE 105 105 104   CO2 18* 22 18*   GLUCOSE 122* 109* 118*   BUN 27* 21 14   CREATININE 0.75 0.81 0.83   CALCIUM 8.2* 8.2* 8.3*     Recent Labs     01/30/22  1849 01/31/22  0258 02/01/22 0210   ASTSGOT  --  20 30   ALTSGPT  --  24 25   TBILIRUBIN  --  0.8 1.0   ALKPHOSPHAT  --  127* 117*   GLOBULIN  --  2.6 2.6   INR 1.31*  --   --      Recent Labs     01/30/22 1849   APTT 34.8   INR 1.31*        IMAGING:   EC-ECHOCARDIOGRAM LTD W/O CONT   Final Result      NM-HEPATOBILIARY SCAN   Final Result      1.  Delayed visualization of the gallbladder after morphine administration does not favor acute cholecystitis.   2.  No biliary obstruction.      CT-ABDOMEN-PELVIS WITH   Final Result      1.  Wall thickening of the duodenal bulb and second portion of the duodenum with surrounding inflammation can be seen in duodenitis/peptic ulcer disease.   2.  Gallbladder is distended with pericholecystic fluid. This can be seen in the setting of cholecystitis.   3.  Atherosclerotic plaque within an ectatic aorta.   4.  Colonic diverticulosis.   5.  2.1 x 2.5 cm fluid collection overlying the right hip arthroplasty is likely a postsurgical evolving hematoma/seroma.   6.  Small hiatal hernia.   7.  Small right pleural effusion and bibasilar atelectasis.   8.  Cardiomegaly.   9.  Linear hypodensity in the inferior spleen could represent a small area of splenic infarction.            DX-CHEST-PORTABLE (1 VIEW)   Final Result      No evidence of acute cardiopulmonary process.      DX-CHEST-LIMITED (1 VIEW)   Final Result      1.  Cardiomegaly.   2.  Slight interstitial prominence. No consolidation or pleural effusion.      DX-FEMUR-2+ RIGHT   Final Result      Mildly angulated and displaced transcervical right  femoral neck fracture.      DX-PELVIS-1 OR 2 VIEWS   Final Result      Mildly displaced transcervical right femoral neck fracture.      CT-PELVIS W/O PLUS RECONS   Final Result      Mildly comminuted, angulated and displaced transcervical right femoral neck fracture.      CT-HEAD W/O   Final Result      1.  Chronic ischemic changes.   2.  No acute intracranial abnormality.         CT-CSPINE WITHOUT PLUS RECONS   Final Result      1.  No acute fracture or dislocation of the cervical spine.   2.  Mild degeneration.   3.  Severe carotid calcified plaque.          ASSESSMENT AND PLAN:     Abnormal CT scan, gallbladder- (present on admission)  Assessment & Plan  Gallbladder is distended with pericholecystic fluid.  1/31 HIDA scan pending       Abdomen today is completely benign.  HIDA scan is negative for acute cholecystitis  No indication for surgery  Signing off.  Please reconsult as needed.  ACS [facility] team.       ____________________________________     Fabrice Sellers M.D.    DD: 2/1/2022  12:31 PM

## 2022-02-01 NOTE — PROGRESS NOTES
[facility] Medicine Daily Progress Note    Date of Service  1/31/2022    Chief Complaint  Navin Suazo is a 89 y.o. male admitted 1/19/2022 with right hip pain    Hospital Course  Navin Suazo is a 89 y.o. male who presented 1/19/2022 with fall.  PMH of dyslipidemia, CKD 3.  Patient tripped down some stairs, fell down on the last step and hit the concrete on the right side earlier this evening.  Did not has hit his head, denies LOC, most of the pain is on the right way he also has some left hip pain as well.  CT head, C-spine with no acute abnormality.  CT pelvis shows angulated and displaced transcervical right femoral neck fracture.  EDP spoke with Ortho who will evaluate the patient for surgery tomorrow.    Interval Problem Update  POD 3 s/p right hip fx repair.   BP normalized. CHAPITO resolved after IVF bolus. H/H stable.  Complaining of insomnia. Melatonin does not work for him. Trial trazodone.  Seeing distressing spider webs in the corners of the room and knows that they are not there. Provided reassurance.  Voiding well, last BM today. Delirium prevention measures.    Motivated, may be appropriate candidate for inpatient rehab. Referral pending.  His wife has parkinsons, is cared for by their daughter, who can not care for both.    1/25-he is slight agitated. Remain cooperative. He believes that his daughter is stuck in a room. He talks about PD. He hopes to talk to director from [facility]. He called his son later telling to his son that he has bugs on his room. Spoke to his daughter. This is not his baseline. He drinks alcohol 5 shots whisk a week, and a glass of wine every dinner.     1/26- he had a rough night. He was placed on restrain. Sleeping during the day. UA no signs of infection. Ammonia level normal. Cannot rule out alcohol withdrawal agitation. No neuro deficits. Continue to monitor. If again agitated tomorrow, I will order brain MRI.     1/27- doing better. A&O x4. Cooperative, not  hallucinating. No need for MRI. meds adjusted, stop trazodone and increased Seroquel to 50 mg nightly. Off sitter. Hope for SNF tomorrow.      1/28- doing better in the morning. A/O x4. Agreed with SNF. Daughter and wife present later to visit him. Rapid response was called, pt lethargic and barely responsive. Had a bowel movement, prior to that. BP 80/28. Rapid team, full work up completed. Pt felt better once he was placed back to his bed. Bolus 500 cc given. Trop/blood cultures/lactic acid to follow.   Discussed with daughter and wife about code status and both agreed with DNAR/DNI. Daughter stated that her father is frail, and would not survive resuscitation, it will just cause more pain then good.   Patient was of sound mind and voluntarily participated in the conversation.  Daughter Jessika and wife were present for the conversation.  I discussed advance care planning face to face with the patient and family for at least 12 minutes, including diagnosis, prognosis, plan of care, risks and benefits of any therapies , code status.     1/29-he is awake and alert.  Blood pressure is improved.  Complaining of loss of appetite.  All lab work from yesterday including blood cultures, lactic acid, troponin were unremarkable.  Patient medically cleared to transfer to skilled nursing    1/30- he complains of nausea and vomiting. He tells me that he cannot hold anything, and cannot eat. Some tenderness on upper abdomen. CT scan done possible cholecystitis. Discuss with Dr. Aguiar. Will start with HIDA scan for possible cholecystitis. If that positive will call [facility] again tomorrow. If negative will call GI for possible EGD .    1/31- he is feeling better. More awake. A&o x3. Seen post EGD. Continue to monitor. HIDA scan pending. If HIDA scan is negative possible transition to oral PPI tomorrow and dc to SNF. Follow up with GI. Not sure if pathology was sent?? His daughter updated on all above.     I have personally  seen and examined the patient at bedside. I discussed the plan of care with patient, family, bedside RN, charge RN,  and pharmacy.    Consultants/Specialty  orthopedics    Code Status  DNAR/DNI    Disposition  Patient is not medically cleared for discharge.   Anticipate discharge to to skilled nursing facility vs inpatient rehab.  I have placed the appropriate orders for post-discharge needs.    Review of Systems  Review of Systems   Constitutional: Positive for malaise/fatigue. Negative for fever.   HENT: Negative for sore throat.    Respiratory: Negative for cough and shortness of breath.    Cardiovascular: Negative for chest pain, palpitations and leg swelling.   Gastrointestinal: Positive for abdominal pain. Negative for blood in stool, constipation, diarrhea, nausea and vomiting.   Genitourinary: Positive for frequency. Negative for dysuria and flank pain.   Musculoskeletal: Positive for joint pain.   Neurological: Negative for dizziness, focal weakness and headaches.   Psychiatric/Behavioral: The patient is nervous/anxious.         Physical Exam  Temp:  [36.1 °C (97 °F)-36.9 °C (98.5 °F)] 36.1 °C (97 °F)  Pulse:  [] 93  Resp:  [14-20] 20  BP: ()/(46-65) 97/52  SpO2:  [91 %-99 %] 94 %    Physical Exam  Vitals and nursing note reviewed.   Constitutional:       General: He is not in acute distress.     Appearance: Normal appearance.   HENT:      Head: Normocephalic and atraumatic.      Right Ear: External ear normal.      Left Ear: External ear normal.      Nose: Nose normal. No congestion.      Mouth/Throat:      Mouth: Mucous membranes are moist.      Pharynx: Oropharynx is clear.   Eyes:      General: No scleral icterus.     Conjunctiva/sclera: Conjunctivae normal.   Cardiovascular:      Rate and Rhythm: Normal rate and regular rhythm.      Heart sounds: Normal heart sounds.   Pulmonary:      Effort: No respiratory distress.      Breath sounds: Normal breath sounds. No wheezing or  rales.   Abdominal:      General: Abdomen is flat. Bowel sounds are normal. There is no distension.      Tenderness: There is abdominal tenderness. There is no guarding.   Musculoskeletal:      Cervical back: Neck supple. No rigidity.      Right lower leg: No edema.      Left lower leg: No edema.      Comments: Right hip surgical dressing in place, c/d/i without strikethrough. Surrounding skin nonerythematous, nonedematous, without ecchymosis.   Skin:     General: Skin is warm and dry.   Neurological:      Mental Status: He is alert and oriented to person, place, and time.      Cranial Nerves: No cranial nerve deficit.   Psychiatric:         Mood and Affect: Mood normal.         Thought Content: Thought content normal.         Judgment: Judgment normal.         Fluids    Intake/Output Summary (Last 24 hours) at 1/31/2022 1617  Last data filed at 1/31/2022 1105  Gross per 24 hour   Intake 50 ml   Output 0 ml   Net 50 ml       Laboratory  Recent Labs     01/30/22  1159 01/30/22  1159 01/30/22  1849 01/30/22  2231 01/31/22  0258 01/31/22  0659 01/31/22  1338   WBC 14.4*  --  12.7*  --  11.9*  --   --    RBC 2.70*  --  2.62*  --  2.45*  --   --    HEMOGLOBIN 9.4*   < > 9.0*   < > 8.6* 8.9* 8.3*   HEMATOCRIT 27.9*   < > 26.9*   < > 25.1* 25.8* 24.4*   .3*  --  102.7*  --  102.4*  --   --    MCH 34.8*  --  34.4*  --  35.1*  --   --    MCHC 33.7  --  33.5*  --  34.3  --   --    RDW 44.9  --  44.4  --  43.8  --   --    PLATELETCT 387  --  379  --  381  --   --    MPV 9.8  --  9.0  --  9.3  --   --     < > = values in this interval not displayed.     Recent Labs     01/29/22  0435 01/30/22  1159 01/31/22  0258   SODIUM 140 136 137   POTASSIUM 4.1 3.9 3.6   CHLORIDE 105 105 105   CO2 23 18* 22   GLUCOSE 120* 122* 109*   BUN 33* 27* 21   CREATININE 0.78 0.75 0.81   CALCIUM 8.0* 8.2* 8.2*     Recent Labs     01/30/22  1849   APTT 34.8   INR 1.31*               Imaging  CT-ABDOMEN-PELVIS WITH   Final Result      1.  Wall  thickening of the duodenal bulb and second portion of the duodenum with surrounding inflammation can be seen in duodenitis/peptic ulcer disease.   2.  Gallbladder is distended with pericholecystic fluid. This can be seen in the setting of cholecystitis.   3.  Atherosclerotic plaque within an ectatic aorta.   4.  Colonic diverticulosis.   5.  2.1 x 2.5 cm fluid collection overlying the right hip arthroplasty is likely a postsurgical evolving hematoma/seroma.   6.  Small hiatal hernia.   7.  Small right pleural effusion and bibasilar atelectasis.   8.  Cardiomegaly.   9.  Linear hypodensity in the inferior spleen could represent a small area of splenic infarction.            DX-CHEST-PORTABLE (1 VIEW)   Final Result      No evidence of acute cardiopulmonary process.      DX-CHEST-LIMITED (1 VIEW)   Final Result      1.  Cardiomegaly.   2.  Slight interstitial prominence. No consolidation or pleural effusion.      DX-FEMUR-2+ RIGHT   Final Result      Mildly angulated and displaced transcervical right femoral neck fracture.      DX-PELVIS-1 OR 2 VIEWS   Final Result      Mildly displaced transcervical right femoral neck fracture.      CT-PELVIS W/O PLUS RECONS   Final Result      Mildly comminuted, angulated and displaced transcervical right femoral neck fracture.      CT-HEAD W/O   Final Result      1.  Chronic ischemic changes.   2.  No acute intracranial abnormality.         CT-CSPINE WITHOUT PLUS RECONS   Final Result      1.  No acute fracture or dislocation of the cervical spine.   2.  Mild degeneration.   3.  Severe carotid calcified plaque.      NM-HEPATOBILIARY SCAN    (Results Pending)        Assessment/Plan  * Closed right hip fracture, initial encounter (HCC)- (present on admission)  Assessment & Plan  Mechanical fall down some stairs prior to admission.  CT pelvis shows right femoral neck fracture  S/p right hip fx repair on 1/21. Appreciate ortho recommendations.  Pain well controlled, continue current  regimen. Wean IV medications as tolerated.  Motivated, may be appropriate candidate for inpatient rehab. Referral pending.  1/25- he is taking NSAIDs only. No narcotic. Continue to monitor    Abnormal CT scan, gallbladder- (present on admission)  Assessment & Plan  Follow up HIDA scan.     Epigastric pain- (present on admission)  Assessment & Plan  WBC is going up. 1/30- N/vomiting. CT scan cannot rule out ulcer vs cholecystitis. Discuss with general surgery Dr. Aguiar. HIDA scan to follow to determine if pt has cholecystitis. If that is negative, will call GI for possible EGD  1/31- possible from peptic ulcer disease. Follow up on HIDA scan     Vasovagal syncope  Assessment & Plan  1/28- rapid team present. All tests ordered. Follow up   CXR unremarkable  EKG unremarkable  Follow up lactic acid, trop (slight elevated, but no clinical significance), blood cultures negative   No pain    1/29-resolved.  At his baseline    DNR (do not resuscitate)- (present on admission)  Assessment & Plan  Discussion as per above.   1/28: Discussed with daughter and wife about code status and both agreed with DNAR/DNI. Daughter stated that her father is frail, and would not survive resuscitation, it will just cause more pain then good.   Patient was of sound mind and voluntarily participated in the conversation.  Daughter Jessika and wife were present for the conversation.  I discussed advance care planning face to face with the patient and family for at least 12 minutes, including diagnosis, prognosis, plan of care, risks and benefits of any therapies , code status.         Encephalopathy- (present on admission)  Assessment & Plan  1/25- this is not his baseline. Talked to his daughter. + visual hallucinations   UA to rule out UTI. Not symptomatic   Cannot rule out alcoholic withdrawal ??  No neuro deficits   Also hospital delirium is on differential.   Otherwise pt has no history of dementia per his daughter and he function well with  the help of his daughter at home prior to this   Will consider brain MRI if not improving in couple of days   1/26- about the same, slight better per rehab physician. Continue to monitor. No signs of UTI, ammonia level normal. Continue to monitor.  1/27- close to his baseline. No further testing. Medically cleared for rehab   1/28- at his baseline. Continue to monitor.   1/29-resolved.  At his baseline  1/30- at his baseline. Will continue seroquel for now   1/31- at his baseline     CHAPITO (acute kidney injury) (HCC)  Assessment & Plan  Developed postoperatively in conjunction with mild hypotension and acute blood loss anemia. Suspect hypovolemia.  Resolved after LR bolus.   Monitor.  1/30- at his baseline     Acute blood loss anemia  Assessment & Plan  Hgb dropped from 14.6 to 12.7 postoperatively.  Iron studies reveal low iron, TIBC. Supplement iron.  Monitor.  1/28- continue to monitor   1/29-hemoglobin stable.  1/30- slight lower again. CT scan possible peptic ulcer. Empiric treatment with omeprazole 20 mg BID  1/31- due to peptic ulcer. Continue PPI IV. Continue to monitor     CAD (coronary artery disease), native coronary artery- (present on admission)  Assessment & Plan  From chart review patient appears of had CAD with a stent placed in the past.  Unclear when this was placed however greater than 1 year ago.  Continue home atorvastatin.  Continue home ASA on discharge.    Elevated MCV- (present on admission)  Assessment & Plan  .9.  B12 and folate ordered  TSH wnl.  Due to alcohol use    Stage 3a chronic kidney disease (HCC)- (present on admission)  Assessment & Plan  History of CKD, eGFR ~60  Avoid nephrotoxic agents  1/30- back to his baseline    Mixed hyperlipidemia- (present on admission)  Assessment & Plan  Continue statin    Insomnia- (present on admission)  Assessment & Plan  May contribute to delirium.   seroquel increased to 50 mg.   Trazodone and melatonin increase risk for Sun down per  PMR  1/30- still sometimes he states that he has hallucination. However behaviors is stable. At his baseline. Continue for now with current regime        VTE prophylaxis: pharmacologic prophylaxis contraindicated due to Pending surgery    I have performed a physical exam and reviewed and updated ROS and Plan today (1/31/2022). In review of yesterday's note (1/30/2022), there are no changes except as documented above.    Interventions to be considered in all patients in order to minimize the risk of delirium.   -do not disturb patient (vitals or lab draws) between the hours of 10 PM and 6 AM.  -ideally the patient should not sleep during the day and we should avoid day time naps.   -up in chair for meals  -ambulate at least three times daily, as able  -watch for constipation  -timed voiding - ask patient is she would like to go to the bathroom q 2-3 hours, except during the do not disturb hours.   -remove all necessary lines (central lines, peripheral IVs, feeding tubes, leslie catheters)  -unless patient has shown harm to self or others I would recommend against use of restraints - either chemical or physical (antipsychotics)   -minimize polypharmacy, do not dose medication during sleep hours

## 2022-02-01 NOTE — PROGRESS NOTES
Cardiology review note:     I was called by Dr. Bob Morales regarding atrial fibrillation.    Chart review reveals a 89-year-old male with a mechanical fall and a history of vasovagal syncope treated for closed hip fracture with multiple electrolyte and hematologic abnormalities.  Call regarding presence of atrial fibrillation.  Review of the available data including ECG shows sinus rhythm with frequent PACs unchanged from prior aside from higher heart rate.  Echocardiogram completed and unremarkable and representative of sinus rhythm as well.  Recommend continuing current medical therapy and follow-up with cardiology as an outpatient if there are further concerns for asymptomatic atrial fibrillation.    At this time it was deemed no formal in person cardiology consultation was necessary, however if this changes due to changes in patient condition or abnormal test results, please consider formal cardiovascular consultation.    Electronically Signed by:    Colby Muse MD, FACC, [facility]  Division of Interventional Cardiology  [facility]    2/1/2022  12:01 PM

## 2022-02-01 NOTE — THERAPY
Missed Therapy     Patient Name: Navin Suazo  Age:  89 y.o., Sex:  male  Medical Record #: [id number]  Today's Date: 2/1/2022    Discussed missed therapy with RN       02/01/22 1140   Interdisciplinary Plan of Care Collaboration   Collaboration Comments OT tx held today due to pt's behaviors, 2 recent episodes of LOC during functional activity, & new dx of A fib. Plan is for pt to move to telemetry floor. Will continue OT as appropriate once tele established.

## 2022-02-02 VITALS
WEIGHT: 200 LBS | DIASTOLIC BLOOD PRESSURE: 67 MMHG | SYSTOLIC BLOOD PRESSURE: 118 MMHG | OXYGEN SATURATION: 100 % | TEMPERATURE: 97.7 F | RESPIRATION RATE: 18 BRPM | HEART RATE: 91 BPM | HEIGHT: 71 IN | BODY MASS INDEX: 28 KG/M2

## 2022-02-02 PROBLEM — G93.40 ENCEPHALOPATHY: Status: RESOLVED | Noted: 2022-01-25 | Resolved: 2022-02-02

## 2022-02-02 PROBLEM — R10.13 EPIGASTRIC PAIN: Status: RESOLVED | Noted: 2022-01-30 | Resolved: 2022-02-02

## 2022-02-02 PROBLEM — N17.9 AKI (ACUTE KIDNEY INJURY) (HCC): Status: RESOLVED | Noted: 2022-01-23 | Resolved: 2022-02-02

## 2022-02-02 PROBLEM — R55 VASOVAGAL SYNCOPE: Status: RESOLVED | Noted: 2022-01-28 | Resolved: 2022-02-02

## 2022-02-02 PROBLEM — R93.2 ABNORMAL CT SCAN, GALLBLADDER: Status: RESOLVED | Noted: 2022-01-31 | Resolved: 2022-02-02

## 2022-02-02 PROBLEM — D62 ACUTE BLOOD LOSS ANEMIA: Status: RESOLVED | Noted: 2022-01-23 | Resolved: 2022-02-02

## 2022-02-02 PROBLEM — S72.001A CLOSED RIGHT HIP FRACTURE, INITIAL ENCOUNTER (HCC): Status: RESOLVED | Noted: 2022-01-19 | Resolved: 2022-02-02

## 2022-02-02 LAB
ALBUMIN SERPL BCP-MCNC: 3 G/DL (ref 3.2–4.9)
BACTERIA BLD CULT: NORMAL
BACTERIA BLD CULT: NORMAL
BUN SERPL-MCNC: 14 MG/DL (ref 8–22)
CALCIUM SERPL-MCNC: 8.1 MG/DL (ref 8.5–10.5)
CHLORIDE SERPL-SCNC: 105 MMOL/L (ref 96–112)
CO2 SERPL-SCNC: 23 MMOL/L (ref 20–33)
CREAT SERPL-MCNC: 1 MG/DL (ref 0.5–1.4)
ERYTHROCYTE [DISTWIDTH] IN BLOOD BY AUTOMATED COUNT: 49.2 FL (ref 35.9–50)
GLUCOSE SERPL-MCNC: 104 MG/DL (ref 65–99)
HCT VFR BLD AUTO: 25.5 % (ref 42–52)
HGB BLD-MCNC: 8.4 G/DL (ref 14–18)
MCH RBC QN AUTO: 34.3 PG (ref 27–33)
MCHC RBC AUTO-ENTMCNC: 32.9 G/DL (ref 33.7–35.3)
MCV RBC AUTO: 104.1 FL (ref 81.4–97.8)
PHOSPHATE SERPL-MCNC: 3.6 MG/DL (ref 2.5–4.5)
PLATELET # BLD AUTO: 389 K/UL (ref 164–446)
PMV BLD AUTO: 9.5 FL (ref 9–12.9)
POTASSIUM SERPL-SCNC: 3.7 MMOL/L (ref 3.6–5.5)
RBC # BLD AUTO: 2.45 M/UL (ref 4.7–6.1)
SARS-COV+SARS-COV-2 AG RESP QL IA.RAPID: NOTDETECTED
SIGNIFICANT IND 70042: NORMAL
SIGNIFICANT IND 70042: NORMAL
SITE SITE: NORMAL
SITE SITE: NORMAL
SODIUM SERPL-SCNC: 138 MMOL/L (ref 135–145)
SOURCE SOURCE: NORMAL
SOURCE SOURCE: NORMAL
SPECIMEN SOURCE: NORMAL
WBC # BLD AUTO: 10 K/UL (ref 4.8–10.8)

## 2022-02-02 PROCEDURE — 700102 HCHG RX REV CODE 250 W/ 637 OVERRIDE(OP): Performed by: HOSPITALIST

## 2022-02-02 PROCEDURE — 700101 HCHG RX REV CODE 250: Performed by: PHYSICAL MEDICINE & REHABILITATION

## 2022-02-02 PROCEDURE — 700102 HCHG RX REV CODE 250 W/ 637 OVERRIDE(OP): Performed by: ORTHOPAEDIC SURGERY

## 2022-02-02 PROCEDURE — C9113 INJ PANTOPRAZOLE SODIUM, VIA: HCPCS | Performed by: INTERNAL MEDICINE

## 2022-02-02 PROCEDURE — A9270 NON-COVERED ITEM OR SERVICE: HCPCS | Performed by: STUDENT IN AN ORGANIZED HEALTH CARE EDUCATION/TRAINING PROGRAM

## 2022-02-02 PROCEDURE — 87426 SARSCOV CORONAVIRUS AG IA: CPT

## 2022-02-02 PROCEDURE — 99239 HOSP IP/OBS DSCHRG MGMT >30: CPT | Performed by: HOSPITALIST

## 2022-02-02 PROCEDURE — 85027 COMPLETE CBC AUTOMATED: CPT

## 2022-02-02 PROCEDURE — 700102 HCHG RX REV CODE 250 W/ 637 OVERRIDE(OP): Performed by: STUDENT IN AN ORGANIZED HEALTH CARE EDUCATION/TRAINING PROGRAM

## 2022-02-02 PROCEDURE — 36415 COLL VENOUS BLD VENIPUNCTURE: CPT

## 2022-02-02 PROCEDURE — A9270 NON-COVERED ITEM OR SERVICE: HCPCS | Performed by: ORTHOPAEDIC SURGERY

## 2022-02-02 PROCEDURE — 80069 RENAL FUNCTION PANEL: CPT

## 2022-02-02 PROCEDURE — A9270 NON-COVERED ITEM OR SERVICE: HCPCS | Performed by: HOSPITALIST

## 2022-02-02 PROCEDURE — 700111 HCHG RX REV CODE 636 W/ 250 OVERRIDE (IP): Performed by: ORTHOPAEDIC SURGERY

## 2022-02-02 PROCEDURE — 700111 HCHG RX REV CODE 636 W/ 250 OVERRIDE (IP): Performed by: INTERNAL MEDICINE

## 2022-02-02 RX ORDER — FOLIC ACID 1 MG/1
1 TABLET ORAL DAILY
Qty: 30 TABLET | Status: SHIPPED
Start: 2022-02-03 | End: 2022-08-22

## 2022-02-02 RX ORDER — FERROUS SULFATE 325(65) MG
325 TABLET ORAL
Qty: 30 TABLET | Status: SHIPPED
Start: 2022-02-03 | End: 2022-08-22

## 2022-02-02 RX ORDER — OXYCODONE HYDROCHLORIDE 5 MG/1
5 TABLET ORAL EVERY 8 HOURS PRN
Qty: 6 TABLET | Refills: 0 | Status: SHIPPED | OUTPATIENT
Start: 2022-02-02 | End: 2022-02-04

## 2022-02-02 RX ORDER — OMEPRAZOLE 40 MG/1
40 CAPSULE, DELAYED RELEASE ORAL DAILY
Qty: 30 CAPSULE | Status: SHIPPED
Start: 2022-02-02 | End: 2022-03-04

## 2022-02-02 RX ORDER — PSEUDOEPHEDRINE HCL 30 MG
100 TABLET ORAL 2 TIMES DAILY
Qty: 60 CAPSULE | Status: SHIPPED
Start: 2022-02-02 | End: 2022-08-22

## 2022-02-02 RX ADMIN — MAGNESIUM CITRATE 296 ML: 1.75 LIQUID ORAL at 12:22

## 2022-02-02 RX ADMIN — PANTOPRAZOLE SODIUM 40 MG: 40 INJECTION, POWDER, FOR SOLUTION INTRAVENOUS at 04:38

## 2022-02-02 RX ADMIN — CYANOCOBALAMIN TAB 500 MCG 1000 MCG: 500 TAB at 04:33

## 2022-02-02 RX ADMIN — ENOXAPARIN SODIUM 40 MG: 40 INJECTION SUBCUTANEOUS at 04:33

## 2022-02-02 RX ADMIN — ATORVASTATIN CALCIUM 40 MG: 40 TABLET, FILM COATED ORAL at 04:34

## 2022-02-02 RX ADMIN — LIDOCAINE 1 PATCH: 50 PATCH TOPICAL at 12:22

## 2022-02-02 RX ADMIN — METOPROLOL TARTRATE 12.5 MG: 25 TABLET, FILM COATED ORAL at 04:34

## 2022-02-02 RX ADMIN — DOCUSATE SODIUM 100 MG: 100 CAPSULE, LIQUID FILLED ORAL at 04:34

## 2022-02-02 RX ADMIN — FOLIC ACID 1 MG: 1 TABLET ORAL at 04:33

## 2022-02-02 ASSESSMENT — PAIN DESCRIPTION - PAIN TYPE: TYPE: ACUTE PAIN

## 2022-02-02 ASSESSMENT — ENCOUNTER SYMPTOMS
NERVOUS/ANXIOUS: 1
FOCAL WEAKNESS: 0
WEAKNESS: 1
FALLS: 1
MYALGIAS: 1

## 2022-02-02 NOTE — PROGRESS NOTES
Called patient daughter Kaitlynn at [phone], left voice message.  I will try to call her tomorrow and update the plan of care

## 2022-02-02 NOTE — PROGRESS NOTES
Gastroenterology Progress Note     Author: Rufina Anderson M.D.   Date & Time Created: 2/2/2022 8:50 AM    Chief Complaint:  GI bleed, melena    Interval History:  Patient is a 89 y/o M with a PMH significant for coronary artery disease, stage IIIa CKD, hyperlipidemia, encephalopathy, who presented to the hospital on 1/19/2022 for mechanical ground-level fall status post hip fracture repair.  Patient had nausea and vomiting on 1/30/2022.  He underwent EGD on 1/31/2022 which showed 3 large duodenal ulcers with clean bases.  HIDA scan was performed on 1/31/2022 which did not show any evidence of biliary obstruction, less likely acute cholecystitis.    NAEON. This AM, patient stated he was tired.    Review of Systems:  Review of Systems   Constitutional: Positive for malaise/fatigue.   Musculoskeletal: Positive for falls and myalgias.   Neurological: Positive for weakness. Negative for focal weakness.   Psychiatric/Behavioral: The patient is nervous/anxious.          Current Facility-Administered Medications:   •  metoprolol tartrate (LOPRESSOR) tablet 12.5 mg, 12.5 mg, Oral, TWICE DAILY, Ethan Morales M.D., 12.5 mg at 02/02/22 0434  •  QUEtiapine (Seroquel) tablet 75 mg, 75 mg, Oral, Nightly, Ezekiel Vizcarra D.O., 75 mg at 02/01/22 1835  •  calcium carbonate (TUMS) chewable tab 500 mg, 500 mg, Oral, DAILY, Jocelin Merino M.D., 500 mg at 02/01/22 0420  •  pantoprazole (Protonix) injection 40 mg, 40 mg, Intravenous, BID, Adelina Butler M.D., 40 mg at 02/02/22 0438  •  lidocaine (LIDODERM) 5 % 1 Patch, 1 Patch, Transdermal, Q24HR, Ezekiel Vizcarra D.O., 1 Patch at 02/01/22 1236  •  ferrous sulfate tablet 325 mg, 325 mg, Oral, QDAY with Breakfast, Terri Matos M.D., 325 mg at 02/01/22 0918  •  oxyCODONE immediate-release (ROXICODONE) tablet 2.5 mg, 2.5 mg, Oral, Q3HRS PRN, 2.5 mg at 02/01/22 1835 **OR** oxyCODONE immediate-release (ROXICODONE) tablet 5 mg, 5 mg, Oral, Q3HRS PRN **OR** [DISCONTINUED]  HYDROmorphone (Dilaudid) injection 0.5 mg, 0.5 mg, Intravenous, Q3HRS PRN, Lloyd Roman M.D.  •  enoxaparin (LOVENOX) inj 40 mg, 40 mg, Subcutaneous, QDAY, Lloyd Roman M.D., 40 mg at 22 0433  •  Pharmacy Consult Request ...Pain Management Review 1 Each, 1 Each, Other, PHARMACY TO DOSE, Lloyd Roman M.D.  •  ondansetron (ZOFRAN) syringe/vial injection 4 mg, 4 mg, Intravenous, Q4HRS PRN, Lloyd Roman M.D., 4 mg at 22 1110  •  diphenhydrAMINE (BENADRYL) injection 25 mg, 25 mg, Intravenous, Q6HRS PRN, Lloyd Roman M.D.  •  haloperidol lactate (HALDOL) injection 1 mg, 1 mg, Intravenous, Q6HRS PRN, Lloyd Roman M.D.  •  scopolamine (TRANSDERM-SCOP) patch 1 Patch, 1 Patch, Transdermal, Q72HRS PRN, Lloyd Roman M.D.  •  docusate sodium (COLACE) capsule 100 mg, 100 mg, Oral, BID, Lloyd Roman M.D., 100 mg at 22 0434  •  senna-docusate (PERICOLACE or SENOKOT S) 8.6-50 MG per tablet 1 Tablet, 1 Tablet, Oral, Nightly, Lloyd Roman M.D., 1 Tablet at 22  •  senna-docusate (PERICOLACE or SENOKOT S) 8.6-50 MG per tablet 1 Tablet, 1 Tablet, Oral, Q24HRS PRN, Lloyd Roman M.D.  •  polyethylene glycol/lytes (MIRALAX) PACKET 1 Packet, 1 Packet, Oral, BID PRN, Lloyd Roman M.D., 1 Packet at 22 0431  •  magnesium hydroxide (MILK OF MAGNESIA) suspension 30 mL, 30 mL, Oral, QDAY PRN, Lloyd Roman M.D., 30 mL at 22 0505  •  bisacodyl (DULCOLAX) suppository 10 mg, 10 mg, Rectal, Q24HRS PRN, Lloyd Roman M.D.  •  sodium phosphate (Fleet) enema 133 mL, 1 Each, Rectal, Once PRN, Lloyd Roman M.D.  •  [] acetaminophen (Tylenol) tablet 650 mg, 650 mg, Oral, Q6HRS, 650 mg at 22 2346 **FOLLOWED BY** acetaminophen (Tylenol) tablet 650 mg, 650 mg, Oral, Q6HRS PRN, Lloyd Roman M.D., 650 mg at 22 2345  •  labetalol (NORMODYNE/TRANDATE) injection 10 mg, 10 mg, Intravenous, Q4HRS PRN, Govind Reyes,  M.D.  •  atorvastatin (LIPITOR) tablet 40 mg, 40 mg, Oral, DAILY, Govind Reyes M.D., 40 mg at 02/02/22 0434  •  folic acid (FOLVITE) tablet 1 mg, 1 mg, Oral, DAILY, Luis F Guillen D.O., 1 mg at 02/02/22 0433  •  cyanocobalamin (VITAMIN B-12) tablet 1,000 mcg, 1,000 mcg, Oral, DAILY, NEISHA BellO., 1,000 mcg at 02/02/22 0433     Physical Exam:  Vitals:    02/02/22 0841   BP: 118/67   Pulse: 91   Resp: 18   Temp: 36.5 °C (97.7 °F)   SpO2: 100%        Physical Exam  Vitals and nursing note reviewed. Exam conducted with a chaperone present (RN present).   Constitutional:       General: He is not in acute distress.     Appearance: He is ill-appearing. He is not toxic-appearing or diaphoretic.   HENT:      Head: Normocephalic and atraumatic.      Right Ear: External ear normal.      Left Ear: External ear normal.      Nose: Nose normal. No congestion or rhinorrhea.      Mouth/Throat:      Mouth: Mucous membranes are dry.      Pharynx: No oropharyngeal exudate.   Eyes:      General:         Right eye: No discharge.         Left eye: No discharge.      Extraocular Movements: Extraocular movements intact.      Pupils: Pupils are equal, round, and reactive to light.   Pulmonary:      Comments: Unlabored breathing  Musculoskeletal:         General: Normal range of motion.      Cervical back: Normal range of motion.      Comments: Absent toenails on R 2nd and 3rd digits   Skin:     General: Skin is warm and dry.      Coloration: Skin is pale.   Neurological:      Mental Status: He is disoriented.   Psychiatric:         Attention and Perception: He is attentive.         Mood and Affect: Mood is not anxious.         Speech: Speech is not rapid and pressured.         Behavior: Behavior is not agitated.         Labs:          Recent Labs     01/31/22  0258 02/01/22  0210 02/02/22  0643   SODIUM 137 137 138   POTASSIUM 3.6 3.5* 3.7   CHLORIDE 105 104 105   CO2 22 18* 23   BUN 21 14 14   CREATININE 0.81 0.83 1.00    MAGNESIUM  --  2.0  --    PHOSPHORUS  --   --  3.6   CALCIUM 8.2* 8.3* 8.1*     Recent Labs     01/31/22 0258 02/01/22 0210 02/02/22  0643   ALTSGPT 24 25  --    ASTSGOT 20 30  --    ALKPHOSPHAT 127* 117*  --    TBILIRUBIN 0.8 1.0  --    GLUCOSE 109* 118* 104*     Recent Labs     01/30/22 1849 01/30/22 2231 01/31/22 0258 01/31/22  0659 02/01/22 0210 02/01/22  0604 02/02/22  0643   RBC 2.62*   < > 2.45*  --  2.60*  --  2.45*   HEMOGLOBIN 9.0*   < > 8.6*   < > 8.8* 9.7* 8.4*   HEMATOCRIT 26.9*   < > 25.1*   < > 26.2* 29.1* 25.5*   PLATELETCT 379   < > 381  --  435  --  389   PROTHROMBTM 15.9*  --   --   --   --   --   --    APTT 34.8  --   --   --   --   --   --    INR 1.31*  --   --   --   --   --   --     < > = values in this interval not displayed.     Recent Labs     01/30/22 1849 01/30/22 1849 01/31/22 0258 02/01/22 0210 02/02/22  0643   WBC 12.7*   < > 11.9* 14.0* 10.0   NEUTSPOLYS 81.80*  --  80.20* 83.30*  --    LYMPHOCYTES 9.80*  --  10.20* 8.80*  --    MONOCYTES 6.90  --  7.60 6.30  --    EOSINOPHILS 0.30  --  0.50 0.50  --    BASOPHILS 0.50  --  0.30 0.50  --    ASTSGOT  --   --  20 30  --    ALTSGPT  --   --  24 25  --    ALKPHOSPHAT  --   --  127* 117*  --    TBILIRUBIN  --   --  0.8 1.0  --     < > = values in this interval not displayed.       Imaging:  EC-ECHOCARDIOGRAM LTD W/O CONT   Final Result      NM-HEPATOBILIARY SCAN   Final Result      1.  Delayed visualization of the gallbladder after morphine administration does not favor acute cholecystitis.   2.  No biliary obstruction.      CT-ABDOMEN-PELVIS WITH   Final Result      1.  Wall thickening of the duodenal bulb and second portion of the duodenum with surrounding inflammation can be seen in duodenitis/peptic ulcer disease.   2.  Gallbladder is distended with pericholecystic fluid. This can be seen in the setting of cholecystitis.   3.  Atherosclerotic plaque within an ectatic aorta.   4.  Colonic diverticulosis.   5.  2.1 x 2.5 cm  fluid collection overlying the right hip arthroplasty is likely a postsurgical evolving hematoma/seroma.   6.  Small hiatal hernia.   7.  Small right pleural effusion and bibasilar atelectasis.   8.  Cardiomegaly.   9.  Linear hypodensity in the inferior spleen could represent a small area of splenic infarction.            DX-CHEST-PORTABLE (1 VIEW)   Final Result      No evidence of acute cardiopulmonary process.      DX-CHEST-LIMITED (1 VIEW)   Final Result      1.  Cardiomegaly.   2.  Slight interstitial prominence. No consolidation or pleural effusion.      DX-FEMUR-2+ RIGHT   Final Result      Mildly angulated and displaced transcervical right femoral neck fracture.      DX-PELVIS-1 OR 2 VIEWS   Final Result      Mildly displaced transcervical right femoral neck fracture.      CT-PELVIS W/O PLUS RECONS   Final Result      Mildly comminuted, angulated and displaced transcervical right femoral neck fracture.      CT-HEAD W/O   Final Result      1.  Chronic ischemic changes.   2.  No acute intracranial abnormality.         CT-CSPINE WITHOUT PLUS RECONS   Final Result      1.  No acute fracture or dislocation of the cervical spine.   2.  Mild degeneration.   3.  Severe carotid calcified plaque.           Assessment:  89 y/o M with a PMH significant for coronary artery disease, stage IIIa CKD, hyperlipidemia, encephalopathy, who presented to the hospital on 1/19/2022 for mechanical ground-level fall status post hip fracture repair.  Status-post EGD 1/31/2022 which showed 3 large duodenal ulcers with clean bases. HIDA scan performed on 1/31/2022 did not show any evidence of biliary obstruction, less likely acute cholecystitis.      Plan:    #NSAID-induced duodenopathy, 3 large duodenal ulcers  #Debility s/p R femoral fracture  #S/p EGD 1/31/2022    -Continue home ASA as outpatient  -Continue PPI tx  -Avoid NSAIDS (other than home ASA)  -PT/OT  -Seizure, fall, aspiration precautions  -Follow with [facility] in outpatient  setting    Thank you for this consult, we will sign off. Discussed with Dr. Morales.    Rufina Anderson MD, MPH  [employer] [facility], PGY-2

## 2022-02-02 NOTE — DISCHARGE PLANNING
Agency/Facility Name: [facility]  Spoke To: Monica  Outcome: No answer. Left message inquiring on bed availability      CM informed

## 2022-02-02 NOTE — DISCHARGE SUMMARY
"Discharge Summary    CHIEF COMPLAINT ON ADMISSION  Chief Complaint   Patient presents with   • GLF     Pt BIB [facility] after falling down 1 stair. Questionable \"head bump\". -LOC. +asa   • Hip Pain     Pt complains of R hip pain following fall        Reason for Admission  TBI     CODE STATUS  DNAR/DNI    HPI & HOSPITAL COURSE  This is a 89-year-old male with a past medical history significant for dyslipidemia, CKD stage III presented to ER on 1/19/2022 after she had a ground-level fall landing on the right side.  Denies loss of consciousness, hitting her head involved and bladder incontinence.     CT head, C-spine no acute abnormality.  CT pelvis showed angulated and displaced transcervical right femoral neck fracture.  Orthopedic surgery was consulted patient underwent open treatment of right femoral fracture, proximal and neck with prosthetic replacement by Dr. Lloyd Kim on 1/21/2022. PT/OT has evaluated the patient, recommended postacute.     Patient underwent endoscopy as he was complaining of epigastric pain, found to have Three large duodenal ulcers, all clean based, all   without stigmata for high risk bleed. will  Continue Omeprazole 40 mg po bid. He needs to follow-up with digestive health as an outpatient.    Hospital course was comp located by nausea and vomiting CT scan showed possible cholecystitis discussed with Dr. Aguiar of surgery recommended HIDA.  HIDA  Scan reviewed and no evidence of acute cholecystitis. Surgery signed off     Also her hospital course was complicated with syncopal episode on 1/28/2022 after having bowel movement likely secondary to vasovagal.EKG was obtained, cardiology was consulted, cardiology stated that patient had PAC. Echocardiogram was obtained, noted to have normal EF, no wall motion abnormality. RSVP 30.     Hospital course was complicated with acute renal failure improved with IV fluid likely secondary to postoperative hypotension and acute blood loss anemia. " Currently patient hemoglobin is stable, today's hemoglobin is noted to be at 8.4.     At this time patient is medically stable to be discharged to skilled nursing facility.  I discussed with GI with stated okay to resume aspirin 81 mg p.o. daily from today.      Therefore, he is discharged in fair and stable condition to skilled nursing facility.    The patient met 2-midnight criteria for an inpatient stay at the time of discharge.      FOLLOW UP ITEMS POST DISCHARGE  2/2/2022    DISCHARGE DIAGNOSES  Principal Problem (Resolved):    Closed right hip fracture, initial encounter ([facility]) POA: Yes  Active Problems:    Mixed hyperlipidemia POA: Yes    Stage 3a chronic kidney disease ([facility]) POA: Yes    Elevated MCV POA: Yes    CAD (coronary artery disease), native coronary artery POA: Yes    DNR (do not resuscitate) POA: Yes  Resolved Problems:    Acute blood loss anemia POA: No    CHAPITO (acute kidney injury) ([facility]) POA: No    Encephalopathy POA: Yes    Vasovagal syncope POA: No    Epigastric pain POA: Yes    Abnormal CT scan, gallbladder POA: Yes    Insomnia POA: Yes      FOLLOW UP  No future appointments.  Lloyd Roman M.D.  [address] [facility] [address]  [phone]    Schedule an appointment as soon as possible for a visit in 2 weeks  Follow-Up    [facility]  [address] [facility] [address] [address]  [phone]          MEDICATIONS ON DISCHARGE     Medication List      START taking these medications      Instructions   docusate sodium 100 MG Caps   Take 100 mg by mouth 2 times a day.  Dose: 100 mg     enoxaparin 40 MG/0.4ML Soln inj  Start taking on: February 3, 2022  Commonly known as: LOVENOX   Inject 40 mg under the skin every day for 23 days.  Dose: 40 mg     ferrous sulfate 325 (65 Fe) MG tablet  Start taking on: February 3, 2022   Take 1 Tablet by mouth every morning with breakfast.  Dose: 325 mg     folic acid 1 MG Tabs  Start taking on: February 3, 2022  Commonly known as: FOLVITE    Take 1 Tablet by mouth every day.  Dose: 1 mg     metoprolol tartrate 25 MG Tabs  Commonly known as: LOPRESSOR   Take 0.5 Tablets by mouth 2 times a day.  Dose: 12.5 mg     omeprazole 40 MG delayed-release capsule  Commonly known as: PRILOSEC   Take 1 Capsule by mouth every day for 30 days.  Dose: 40 mg     oxyCODONE immediate-release 5 MG Tabs  Commonly known as: ROXICODONE   Take 1 Tablet by mouth every 8 hours as needed for up to 2 days.  Dose: 5 mg        CONTINUE taking these medications      Instructions   atorvastatin 40 MG Tabs  Commonly known as: LIPITOR   Take 40 mg by mouth every day.  Dose: 40 mg     therapeutic multivitamin-minerals Tabs   Take 1 Tablet by mouth every day.  Dose: 1 Tablet        STOP taking these medications    GLUCOSAMINE PO     ibuprofen 200 MG Tabs  Commonly known as: MOTRIN     Melatonin 10 MG Tbdp        ASK your doctor about these medications      Instructions   aspirin 81 MG Chew chewable tablet  Commonly known as: ASA   Chew 81 mg every day.  Dose: 81 mg            Allergies  No Known Allergies    DIET  Orders Placed This Encounter   Procedures   • Diet Order Diet: Cardiac     Standing Status:   Standing     Number of Occurrences:   1     Order Specific Question:   Diet:     Answer:   Cardiac [6]       ACTIVITY  As tolerated.  Weight bearing as tolerated    LINES, DRAINS, AND WOUNDS  This is an automated list. Peripheral IVs will be removed prior to discharge.  Peripheral IV 02/01/22 20 G Anterior;Right Forearm (Active)   Site Assessment Clean;Dry;Intact 02/02/22 1100   Dressing Type Transparent;Occlusive 02/02/22 1100   Line Status Scrubbed the hub prior to access;Flushed;Saline locked 02/02/22 1100   Dressing Status Clean;Dry;Intact 02/02/22 1100   Dressing Intervention N/A 02/02/22 1100   Infiltration Grading ([facility], [facility] [place] [facility]) 0 02/02/22 1100   Phlebitis Scale ([facility]) 0 02/02/22 1100       Wound 01/20/22 Skin Tear Hand Anterior Right (Active)   Wound Image   01/20/22 6660    Site Assessment Dry;Pink;Red 02/02/22 1100   Periwound Assessment Clean;Dry;Intact 02/02/22 1100   Margins Unattached edges 02/02/22 1100   Closure Open to air 02/02/22 1100   Drainage Amount None 02/02/22 1100   Drainage Description Serosanguineous 01/20/22 1945   Treatments Offloading 01/30/22 2100   Dressing Options Open to Air 02/02/22 1100   Dressing Changed Observed 02/01/22 0800   Dressing Status Open to Air 02/01/22 0800       Wound 01/20/22 Other (comment) Foot Distal Left (Active)   Wound Image   01/20/22 1608   Site Assessment Dry;Pink;Red;Fragile 02/02/22 1100   Periwound Assessment Dry;Red;Purple;Cool 02/02/22 1100   Margins Undefined edges 02/02/22 1100   Closure Open to air 02/02/22 1100   Drainage Amount None 02/01/22 1945   Treatments Offloading 01/30/22 2100   Dressing Options Open to Air 02/01/22 1945   Dressing Changed Observed 02/01/22 0800   Dressing Status Open to Air 02/01/22 0800       Wound 01/21/22 Incision Leg Right [place], mepilex dressing.  (Active)   Site Assessment ENMA;Other (Comment) 02/02/22 1100   Periwound Assessment ENMA 02/02/22 1100   Margins ENMA 02/02/22 1100   Closure ENMA 02/02/22 1100   Drainage Amount ENMA 02/02/22 1100   Drainage Description ENMA 02/02/22 1100   Treatments Offloading 02/02/22 1100   Dressing Options Mepilex Silver 02/02/22 1100   Dressing Changed Observed 02/01/22 1945   Dressing Status Clean;Dry;Intact 02/01/22 1945       Peripheral IV 02/01/22 20 G Anterior;Right Forearm (Active)   Site Assessment Clean;Dry;Intact 02/02/22 1100   Dressing Type Transparent;Occlusive 02/02/22 1100   Line Status Scrubbed the hub prior to access;Flushed;Saline locked 02/02/22 1100   Dressing Status Clean;Dry;Intact 02/02/22 1100   Dressing Intervention N/A 02/02/22 1100   Infiltration Grading ([facility]) 0 02/02/22 1100   Phlebitis Scale ([facility]) 0 02/02/22 1100               MENTAL STATUS ON TRANSFER             CONSULTATIONS  Ortho   GI   Cards    PROCEDURES  Open  treatment of right femoral fracture, proximal end, neck with prosthetic replacement on 1/21    Three large duodenal ulcers, all clean based, all   without stigmata for high risk bleed. On 1/31            LABORATORY  Lab Results   Component Value Date    SODIUM 138 02/02/2022    POTASSIUM 3.7 02/02/2022    CHLORIDE 105 02/02/2022    CO2 23 02/02/2022    GLUCOSE 104 (H) 02/02/2022    BUN 14 02/02/2022    CREATININE 1.00 02/02/2022    CREATININE 1.4 09/26/2005        Lab Results   Component Value Date    WBC 10.0 02/02/2022    HEMOGLOBIN 8.4 (L) 02/02/2022    HEMATOCRIT 25.5 (L) 02/02/2022    PLATELETCT 389 02/02/2022        Total time of the discharge process exceeds 35 minutes.    I went to the bedside of the patient, evaluated the patient, reviewed extensive data. Patient is alert and oriented, answering question appropriately. At this time patient is medically stable to be discharged to skilled nursing facility    Plan of care has been explained in detail with the patient daughter Kaitlynn over phone.

## 2022-02-02 NOTE — PROGRESS NOTES
Assumed care of pt. Bedside report received from Shikha KIM. Pt was updated on plan of care. Call light, phone and personal belongings in reach. Bed alarm on and working properly, bed in lowest position, and locked.

## 2022-02-02 NOTE — PROGRESS NOTES
Pt discharged to [facility] via [facility]. Discharge instructions gone over with Yaneth KIM. IV removed,COBRA packet given to [facility]. This RN called [facility] and gave report to Mary KIM.

## 2022-02-02 NOTE — DISCHARGE PLANNING
DC Transport Scheduled    Received request at: 1222    Transport Company Scheduled:  [facility]      Scheduled Date: 02/02/22  Scheduled Time: 1500    Destination: [address] [place]    Notified care team of scheduled transport via [employer].     If there are any changes needed to the DC transportation scheduled, please contact [facility] at ext. [phone] between the hours of 9403-1994 Mon-Fri. If outside those hours, contact the ED Case Manager at ext. [phone].

## 2022-02-02 NOTE — CARE PLAN
The patient is Stable - Low risk of patient condition declining or worsening    Shift Goals  Clinical Goals: Safety, pain management, reorientation, sleep  Patient Goals: Rest  Family Goals: rito    Progress made toward(s) clinical / shift goals:    Problem: Pain - Standard  Goal: Alleviation of pain or a reduction in pain to the patient’s comfort goal  Outcome: Progressing     Problem: Fall Risk  Goal: Patient will remain free from falls  Outcome: Progressing     Assumed care of patient at 1900. Patient shouting very loudly from his room at 1945, entered to assess and reorient patient. Assessment performed, the patient's dressing was clean dry and intact with dark bruising at the site. Reoriented patient to location, time and situation. Assisted the patient in repositioning to a more comfortable position on his side. Attempted to place pulse oximeter, but the patient removed it quickly despite education. Replaced nasal cannula as well, and reminded patient that it needs to stay in place. Also discussed potential transfer to [facility] on 2/2 and mobility precautions. The patient indicated understanding but there was no evidence of learning. Ensured fall precautions were in place, as day shift RN stated they had a problem with the bed alarm. The bed alarm was functional and I implemented the frame alarm as well. The patient fell back to sleep quickly, no further needs and no distress noted.     The patient has become confused and agitated a few times throughout the night, but has responded well to reorientation and repositioning. The patient is currently sleeping, with nasal cannula and fall precautions in place.

## 2022-02-02 NOTE — PROGRESS NOTES
"   Orthopaedic Progress Note    Interval changes:  Patient doing well  Dressing CDI  RLE dressings CDI  Cleared for DC to SNF by ortho pending medicine clearance    ROS - Patient denies any new issues but is confused.  Pain well controlled.    /56   Pulse 98   Temp 37.8 °C (100 °F) (Temporal)   Resp 20   Ht 1.803 m (5' 11\")   Wt 90.7 kg (200 lb)   SpO2 95%       Patient seen and examined  No acute distress  Breathing non labored  RRR  RLE dressings CDI, DNVI, moves all toes, cap refill <2 sec.     Recent Labs     01/30/22  1849 01/30/22  2231 01/31/22  0258 01/31/22  0659 01/31/22  2151 02/01/22  0210 02/01/22  0604   WBC 12.7*  --  11.9*  --   --  14.0*  --    RBC 2.62*  --  2.45*  --   --  2.60*  --    HEMOGLOBIN 9.0*   < > 8.6*   < > 8.9* 8.8* 9.7*   HEMATOCRIT 26.9*   < > 25.1*   < > 26.3* 26.2* 29.1*   .7*  --  102.4*  --   --  100.8*  --    MCH 34.4*  --  35.1*  --   --  33.8*  --    MCHC 33.5*  --  34.3  --   --  33.6*  --    RDW 44.4  --  43.8  --   --  45.0  --    PLATELETCT 379  --  381  --   --  435  --    MPV 9.0  --  9.3  --   --  9.0  --     < > = values in this interval not displayed.       Active Hospital Problems    Diagnosis    • Abnormal CT scan, gallbladder [R93.2]      Priority: Medium   • Epigastric pain [R10.13]    • DNR (do not resuscitate) [Z66]    • Vasovagal syncope [R55]    • Encephalopathy [G93.40]    • Acute blood loss anemia [D62]    • CHAPITO (acute kidney injury) (HCC) [N17.9]    • Stage 3a chronic kidney disease (HCC) [N18.31]    • Elevated MCV [R71.8]    • CAD (coronary artery disease), native coronary artery [I25.10]    • Closed right hip fracture, initial encounter ([facility]) [S72.001A]    • Mixed hyperlipidemia [E78.2]    • Insomnia [G47.00]        Assessment/Plan:  Patient doing well  Dressing CDI  RLE dressings CDI  POD#11 S/P Open treatment of right femoral fracture, proximal end, neck with prosthetic replacement  Wt bearing status - WBAT  Wound care/Drains - " Dressings to be changed every other day by nursing  Future Procedures - none planned   Lovenox: Start 1/22, Duration-until ambulatory > 150'  Sutures/Staples out- 14 days post operatively  PT/OT-initiated  Antibiotics:  Perioperative completed  DVT Prophylaxis- TEDS/SCDs/Foot pumps  Raya-none  Case Coordination for Discharge Planning - Disposition SNF     I have performed a physical exam and reviewed and updated ROS and Plan today (2/1). In review of yesterday's note (1/31), there are no changes except as documented above.

## 2022-02-02 NOTE — DISCHARGE PLANNING
Anticipated Discharge Disposition: [facility]    Action: LSW left a message with patient's spouse, Haritha Suazo @ [phone] regarding discharge. LSW then called patients daughter, Kaitlynn Bedolla @ [phone] to inform her of her father's acceptance to [facility]. Kaitlynn states they are happy patient will be going today. LSW will let them know when transport is set to go to [facility]. LSW went over IMM and COBRA form and Kaitlynn agrees to a verbal approval to sign.    LSW sent over transport form [employer]. Transport set for 15:00 to [facility] by GMT transport.    LSW spoke with daughter, Kaitlynn and let her know of transportation time to 15:00 to [facility]. Kaitlynn is good with transport date.     Barriers to Discharge: None    Plan: Patient accepted to [facility] and will be transported by GMT at 15:00.

## 2022-02-02 NOTE — PROGRESS NOTES
"Pt requiring a stool sample per GI doctor prior to being discharged. Dr. Morales ordered magnesium citrate. Given see MAR. Dr Morales then requested pt have a suppository. This RN assessed pt around 1100 am, and at 1300 and pt is alert and oriented x4. Pt is refusing suppository. Pt stated \" I have not had much food I do not need to go and a suppository will not help that, I drank the drink and that's all.\" Dr Morales notified.   "

## 2022-02-22 ENCOUNTER — HOSPITAL ENCOUNTER (OUTPATIENT)
Facility: MEDICAL CENTER | Age: 87
End: 2022-02-23
Attending: EMERGENCY MEDICINE | Admitting: INTERNAL MEDICINE
Payer: MEDICARE

## 2022-02-22 ENCOUNTER — APPOINTMENT (OUTPATIENT)
Dept: RADIOLOGY | Facility: MEDICAL CENTER | Age: 87
End: 2022-02-22
Attending: EMERGENCY MEDICINE
Payer: MEDICARE

## 2022-02-22 DIAGNOSIS — Z98.890 STATUS POST OPEN REDUCTION AND INTERNAL FIXATION (ORIF) OF FRACTURE: ICD-10-CM

## 2022-02-22 DIAGNOSIS — I48.91 ATRIAL FIBRILLATION, UNSPECIFIED TYPE (HCC): ICD-10-CM

## 2022-02-22 DIAGNOSIS — R55 SYNCOPE AND COLLAPSE: ICD-10-CM

## 2022-02-22 DIAGNOSIS — Z87.81 STATUS POST OPEN REDUCTION AND INTERNAL FIXATION (ORIF) OF FRACTURE: ICD-10-CM

## 2022-02-22 DIAGNOSIS — R55 SYNCOPE, UNSPECIFIED SYNCOPE TYPE: ICD-10-CM

## 2022-02-22 PROBLEM — I48.0 PAROXYSMAL ATRIAL FIBRILLATION (HCC): Status: ACTIVE | Noted: 2022-02-22

## 2022-02-22 LAB
ALBUMIN SERPL BCP-MCNC: 4.5 G/DL (ref 3.2–4.9)
ALBUMIN/GLOB SERPL: 1.1 G/DL
ALP SERPL-CCNC: 131 U/L (ref 30–99)
ALT SERPL-CCNC: 18 U/L (ref 2–50)
ANION GAP SERPL CALC-SCNC: 16 MMOL/L (ref 7–16)
AST SERPL-CCNC: 30 U/L (ref 12–45)
BASOPHILS # BLD AUTO: 0.6 % (ref 0–1.8)
BASOPHILS # BLD: 0.05 K/UL (ref 0–0.12)
BILIRUB SERPL-MCNC: 0.6 MG/DL (ref 0.1–1.5)
BUN SERPL-MCNC: 12 MG/DL (ref 8–22)
CALCIUM SERPL-MCNC: 9.5 MG/DL (ref 8.5–10.5)
CHLORIDE SERPL-SCNC: 98 MMOL/L (ref 96–112)
CO2 SERPL-SCNC: 23 MMOL/L (ref 20–33)
CREAT SERPL-MCNC: 1.18 MG/DL (ref 0.5–1.4)
EKG IMPRESSION: NORMAL
EOSINOPHIL # BLD AUTO: 0.08 K/UL (ref 0–0.51)
EOSINOPHIL NFR BLD: 0.9 % (ref 0–6.9)
ERYTHROCYTE [DISTWIDTH] IN BLOOD BY AUTOMATED COUNT: 53.7 FL (ref 35.9–50)
GLOBULIN SER CALC-MCNC: 4.1 G/DL (ref 1.9–3.5)
GLUCOSE SERPL-MCNC: 131 MG/DL (ref 65–99)
HCT VFR BLD AUTO: 45.1 % (ref 42–52)
HGB BLD-MCNC: 14.4 G/DL (ref 14–18)
IMM GRANULOCYTES # BLD AUTO: 0.03 K/UL (ref 0–0.11)
IMM GRANULOCYTES NFR BLD AUTO: 0.4 % (ref 0–0.9)
LYMPHOCYTES # BLD AUTO: 1.15 K/UL (ref 1–4.8)
LYMPHOCYTES NFR BLD: 13.5 % (ref 22–41)
MCH RBC QN AUTO: 33.4 PG (ref 27–33)
MCHC RBC AUTO-ENTMCNC: 31.9 G/DL (ref 33.7–35.3)
MCV RBC AUTO: 104.6 FL (ref 81.4–97.8)
MONOCYTES # BLD AUTO: 0.65 K/UL (ref 0–0.85)
MONOCYTES NFR BLD AUTO: 7.7 % (ref 0–13.4)
NEUTROPHILS # BLD AUTO: 6.53 K/UL (ref 1.82–7.42)
NEUTROPHILS NFR BLD: 76.9 % (ref 44–72)
NRBC # BLD AUTO: 0 K/UL
NRBC BLD-RTO: 0 /100 WBC
PLATELET # BLD AUTO: 280 K/UL (ref 164–446)
PMV BLD AUTO: 9.4 FL (ref 9–12.9)
POTASSIUM SERPL-SCNC: 4.3 MMOL/L (ref 3.6–5.5)
PROT SERPL-MCNC: 8.6 G/DL (ref 6–8.2)
RBC # BLD AUTO: 4.31 M/UL (ref 4.7–6.1)
SODIUM SERPL-SCNC: 137 MMOL/L (ref 135–145)
TROPONIN T SERPL-MCNC: 24 NG/L (ref 6–19)
WBC # BLD AUTO: 8.5 K/UL (ref 4.8–10.8)

## 2022-02-22 PROCEDURE — 99220 PR INITIAL OBSERVATION CARE,LEVL III: CPT | Performed by: INTERNAL MEDICINE

## 2022-02-22 PROCEDURE — 93005 ELECTROCARDIOGRAM TRACING: CPT | Performed by: EMERGENCY MEDICINE

## 2022-02-22 PROCEDURE — G0378 HOSPITAL OBSERVATION PER HR: HCPCS

## 2022-02-22 PROCEDURE — 700105 HCHG RX REV CODE 258: Performed by: INTERNAL MEDICINE

## 2022-02-22 PROCEDURE — 84484 ASSAY OF TROPONIN QUANT: CPT

## 2022-02-22 PROCEDURE — 80053 COMPREHEN METABOLIC PANEL: CPT

## 2022-02-22 PROCEDURE — 70450 CT HEAD/BRAIN W/O DYE: CPT | Mod: MG

## 2022-02-22 PROCEDURE — 700102 HCHG RX REV CODE 250 W/ 637 OVERRIDE(OP): Performed by: INTERNAL MEDICINE

## 2022-02-22 PROCEDURE — A9270 NON-COVERED ITEM OR SERVICE: HCPCS | Performed by: INTERNAL MEDICINE

## 2022-02-22 PROCEDURE — 85025 COMPLETE CBC W/AUTO DIFF WBC: CPT

## 2022-02-22 PROCEDURE — 99285 EMERGENCY DEPT VISIT HI MDM: CPT

## 2022-02-22 PROCEDURE — 71045 X-RAY EXAM CHEST 1 VIEW: CPT

## 2022-02-22 RX ORDER — FERROUS SULFATE 325(65) MG
325 TABLET ORAL
Status: DISCONTINUED | OUTPATIENT
Start: 2022-02-23 | End: 2022-02-23 | Stop reason: HOSPADM

## 2022-02-22 RX ORDER — DOCUSATE SODIUM 100 MG/1
100 CAPSULE, LIQUID FILLED ORAL 2 TIMES DAILY
Status: DISCONTINUED | OUTPATIENT
Start: 2022-02-22 | End: 2022-02-23 | Stop reason: HOSPADM

## 2022-02-22 RX ORDER — ASPIRIN 81 MG/1
81 TABLET, CHEWABLE ORAL DAILY
Status: DISCONTINUED | OUTPATIENT
Start: 2022-02-23 | End: 2022-02-23 | Stop reason: HOSPADM

## 2022-02-22 RX ORDER — SODIUM CHLORIDE, SODIUM LACTATE, POTASSIUM CHLORIDE, CALCIUM CHLORIDE 600; 310; 30; 20 MG/100ML; MG/100ML; MG/100ML; MG/100ML
INJECTION, SOLUTION INTRAVENOUS CONTINUOUS
Status: DISCONTINUED | OUTPATIENT
Start: 2022-02-22 | End: 2022-02-23 | Stop reason: HOSPADM

## 2022-02-22 RX ORDER — BISACODYL 10 MG
10 SUPPOSITORY, RECTAL RECTAL
Status: DISCONTINUED | OUTPATIENT
Start: 2022-02-22 | End: 2022-02-23 | Stop reason: HOSPADM

## 2022-02-22 RX ORDER — ACETAMINOPHEN 325 MG/1
650 TABLET ORAL EVERY 4 HOURS PRN
Status: SHIPPED | COMMUNITY
End: 2022-08-22

## 2022-02-22 RX ORDER — AMOXICILLIN 250 MG
2 CAPSULE ORAL 2 TIMES DAILY
Status: DISCONTINUED | OUTPATIENT
Start: 2022-02-22 | End: 2022-02-23 | Stop reason: HOSPADM

## 2022-02-22 RX ORDER — FOLIC ACID 1 MG/1
1 TABLET ORAL DAILY
Status: DISCONTINUED | OUTPATIENT
Start: 2022-02-23 | End: 2022-02-23 | Stop reason: HOSPADM

## 2022-02-22 RX ORDER — OMEPRAZOLE 20 MG/1
40 CAPSULE, DELAYED RELEASE ORAL DAILY
Status: DISCONTINUED | OUTPATIENT
Start: 2022-02-23 | End: 2022-02-23 | Stop reason: HOSPADM

## 2022-02-22 RX ORDER — ATORVASTATIN CALCIUM 40 MG/1
40 TABLET, FILM COATED ORAL EVERY EVENING
Status: DISCONTINUED | OUTPATIENT
Start: 2022-02-22 | End: 2022-02-23 | Stop reason: HOSPADM

## 2022-02-22 RX ORDER — ACETAMINOPHEN 325 MG/1
650 TABLET ORAL EVERY 6 HOURS PRN
Status: DISCONTINUED | OUTPATIENT
Start: 2022-02-22 | End: 2022-02-23 | Stop reason: HOSPADM

## 2022-02-22 RX ORDER — M-VIT,TX,IRON,MINS/CALC/FOLIC 27MG-0.4MG
1 TABLET ORAL DAILY
Status: DISCONTINUED | OUTPATIENT
Start: 2022-02-23 | End: 2022-02-23 | Stop reason: HOSPADM

## 2022-02-22 RX ORDER — POLYETHYLENE GLYCOL 3350 17 G/17G
1 POWDER, FOR SOLUTION ORAL
Status: DISCONTINUED | OUTPATIENT
Start: 2022-02-22 | End: 2022-02-23 | Stop reason: HOSPADM

## 2022-02-22 RX ORDER — PHENOL 1.4 %
10 AEROSOL, SPRAY (ML) MUCOUS MEMBRANE
Status: SHIPPED | COMMUNITY
End: 2022-08-22

## 2022-02-22 RX ADMIN — SODIUM CHLORIDE, POTASSIUM CHLORIDE, SODIUM LACTATE AND CALCIUM CHLORIDE: 600; 310; 30; 20 INJECTION, SOLUTION INTRAVENOUS at 17:51

## 2022-02-22 RX ADMIN — ACETAMINOPHEN 650 MG: 325 TABLET, FILM COATED ORAL at 17:52

## 2022-02-22 RX ADMIN — SENNOSIDES AND DOCUSATE SODIUM 2 TABLET: 50; 8.6 TABLET ORAL at 18:00

## 2022-02-22 RX ADMIN — ATORVASTATIN CALCIUM 40 MG: 40 TABLET, FILM COATED ORAL at 17:51

## 2022-02-22 RX ADMIN — DOCUSATE SODIUM 100 MG: 100 CAPSULE ORAL at 17:50

## 2022-02-22 ASSESSMENT — FIBROSIS 4 INDEX
FIB4 SCORE: 2.27
FIB4 SCORE: 1.39

## 2022-02-22 ASSESSMENT — ENCOUNTER SYMPTOMS
DIZZINESS: 0
FEVER: 0
CHILLS: 0
HEADACHES: 0

## 2022-02-22 ASSESSMENT — LIFESTYLE VARIABLES
DOES PATIENT WANT TO STOP DRINKING: NO
AVERAGE NUMBER OF DAYS PER WEEK YOU HAVE A DRINK CONTAINING ALCOHOL: 4
EVER HAD A DRINK FIRST THING IN THE MORNING TO STEADY YOUR NERVES TO GET RID OF A HANGOVER: NO
TOTAL SCORE: 0
EVER FELT BAD OR GUILTY ABOUT YOUR DRINKING: NO
ALCOHOL_USE: YES
ON A TYPICAL DAY WHEN YOU DRINK ALCOHOL HOW MANY DRINKS DO YOU HAVE: 2
TOTAL SCORE: 0
HOW MANY TIMES IN THE PAST YEAR HAVE YOU HAD 5 OR MORE DRINKS IN A DAY: 0
CONSUMPTION TOTAL: NEGATIVE
TOTAL SCORE: 0
HAVE PEOPLE ANNOYED YOU BY CRITICIZING YOUR DRINKING: NO
HAVE YOU EVER FELT YOU SHOULD CUT DOWN ON YOUR DRINKING: NO

## 2022-02-22 ASSESSMENT — PAIN DESCRIPTION - PAIN TYPE
TYPE: ACUTE PAIN
TYPE: ACUTE PAIN

## 2022-02-22 NOTE — ED TRIAGE NOTES
"Chief Complaint   Patient presents with   • Syncope     No fall, syncopal episode sitting in wheelchair at [facility] for hip    • Hypotension       Pt BIB [facility] cc syncopal episode sitting in wheelchair. EMS states pt +LOC, pt at [facility] for hip rehab care. EMS states initial vitals BP 80/50. Glucose 195, unknown hx of DM. EMS states pt at baseline cognition, AOx4. On transfer to ER EMS states BP 99/62, a fib 80-90 96% RA.     Pt transfer to [facility], gown put on and soiled clothes removed, vitals.     Pulse 74   Resp (!) 24   Ht 1.803 m (5' 11\")   Wt 74.4 kg (164 lb)   BMI 22.87 kg/m²     "

## 2022-02-22 NOTE — ED PROVIDER NOTES
ED Provider Note    Scribed for Cande Thurston M.D. by Perry Voss. 2/22/2022, 1:54 PM.    Primary care provider: Karthikeyan Chaudhry M.D.  Means of arrival: EMS  History obtained from: Patient and EMS   History limited by: None    CHIEF COMPLAINT  Chief Complaint   Patient presents with   • Syncope     No fall, syncopal episode sitting in wheelchair at [facility] for hip    • Hypotension       HPI  Navin Suazo is a 89 y.o. male who presents to the Emergency Department via EMS for evaluation of a syncopal episode onset prior to arrival. Per EMS the patient was at an appointment at the [facility] when then episode occurred. He recently had left hip surgery and was at [facility] for a follow-up. The patient was sitting in his wheel chair during the episode and he did not fall out. Per EMS, bystanders reported he had LOC.  Patient does not recall the syncopal episode and feels well at this time.  He still believes he is waiting for his appointment at [facility], he has to be reminded that he is in the emergency department.  He denies any headache, chest pain, or dizziness. He is currently living at a rehab facility. There are no known alleviating or exacerbating factors.     Obtained and reviewed past medical records which indicated the patient had a ground level fall on 1/19/21 resulting in a femoral neck fracture, with subsequent operative management on 1/21/22. During this hospitalization the patient had a syncopal episode that was thought to be vasovagal. His syncope work-up at that time was unremarkable.  He was subsequently discharged to rehab facility.  I spoke with his daughter Kaitlynn who reports that he has been doing okay at the facility.  They stopped his therapies a few days ago and have not been giving him rehab due to insurance reasons.    REVIEW OF SYSTEMS  Review of Systems   Constitutional: Negative for chills and fever.   Cardiovascular: Negative for chest pain.   Neurological: Negative for dizziness  "and headaches.        Positive for syncope   All other systems reviewed and are negative.       PAST MEDICAL HISTORY   has a past medical history of Erectile dysfunction.    SURGICAL HISTORY   has a past surgical history that includes hernia repair; vasectomy; partial hip replacement (Right, 1/21/2022); and upper gi endoscopy,diagnosis (N/A, 1/31/2022).    SOCIAL HISTORY  Social History     Tobacco Use   • Smoking status: Former Smoker   • Smokeless tobacco: Never Used   Vaping Use   • Vaping Use: Never used   Substance Use Topics   • Alcohol use: Yes     Alcohol/week: 4.2 oz     Types: 7 Standard drinks or equivalent per week     Comment: one per day   • Drug use: Never      Social History     Substance and Sexual Activity   Drug Use Never       FAMILY HISTORY  Family History   Problem Relation Age of Onset   • Other Mother    • Other Father        CURRENT MEDICATIONS  Home Medications     Reviewed by Hetal Christopher R.N. (Registered Nurse) on 02/22/22 at 1337  Med List Status: Partial   Medication Last Dose Status   aspirin (ASA) 81 MG Chew Tab chewable tablet  Active   atorvastatin (LIPITOR) 40 MG Tab  Active   docusate sodium 100 MG Cap  Active   enoxaparin (LOVENOX) 40 MG/0.4ML Solution inj  Active   ferrous sulfate 325 (65 Fe) MG tablet  Active   folic acid (FOLVITE) 1 MG Tab  Active   metoprolol tartrate (LOPRESSOR) 25 MG Tab  Active   omeprazole (PRILOSEC) 40 MG delayed-release capsule  Active   therapeutic multivitamin-minerals (THERAGRAN-M) Tab  Active                 ALLERGIES  No Known Allergies    PHYSICAL EXAM  VITAL SIGNS: /66   Pulse 74   Temp 36.2 °C (97.1 °F) (Temporal)   Resp (!) 24   Ht 1.803 m (5' 11\")   Wt 74.4 kg (164 lb)   SpO2 96%   BMI 22.87 kg/m²   Vitals reviewed by myself.  Physical Exam  Nursing note and vitals reviewed.  Constitutional: Well-developed and well-nourished. No acute distress.   HENT: Head is normocephalic and atraumatic.  Eyes: extra-ocular movements " intact  Cardiovascular: Regular rate and  regular rhythm. No murmur heard.  Pulmonary/Chest: Breath sounds normal. No wheezes or rales.   Abdominal: Soft and non-tender. No distention.    Musculoskeletal: Right hip incision is healing and intact. Extremities exhibit normal range of motion without edema or tenderness.   Neurological: Awake and alert, patient is oriented to self, slightly confused about events and where he is at this time.  No focal neurologic deficits, cranial nerves II through XII intact.  No evidence of head trauma.  Skin: Skin is warm and dry. No rash.         DIAGNOSTIC STUDIES  LABS  Labs Reviewed   CBC WITH DIFFERENTIAL - Abnormal; Notable for the following components:       Result Value    RBC 4.31 (*)     .6 (*)     MCH 33.4 (*)     MCHC 31.9 (*)     RDW 53.7 (*)     Neutrophils-Polys 76.90 (*)     Lymphocytes 13.50 (*)     All other components within normal limits   COMP METABOLIC PANEL - Abnormal; Notable for the following components:    Glucose 131 (*)     Alkaline Phosphatase 131 (*)     Total Protein 8.6 (*)     Globulin 4.1 (*)     All other components within normal limits   TROPONIN - Abnormal; Notable for the following components:    Troponin T 24 (*)     All other components within normal limits   ESTIMATED GFR - Abnormal; Notable for the following components:    GFR If Non  58 (*)     All other components within normal limits     All labs reviewed by me.    EKG Interpretation:  Interpreted by myself    12 Lead EKG interpreted by me to show:  EKG at 1:43 PM: Atrial fibrillation that is rate controlled at a rate of 74, left axis deviation, normal intervals, QRS 86, QTc 426, no acute ST-T segment changes, no evidence of acute ischemia, atrial fibrillation appears to be new for this patient  My impression of this EKG: Does not indicate ischemiaat this time.    RADIOLOGY  CT-HEAD W/O   Final Result      1.  Diffuse atrophy and white matter changes.   2.  No acute  intracranial hemorrhage or territorial infarct.            DX-CHEST-PORTABLE (1 VIEW)   Final Result      1.  There are hazy bibasilar linear opacities, left greater than right. This is probably due to atelectasis or scarring with pneumonitis considered less likely.        The radiologist's interpretation of all radiological studies have been reviewed by me.      REASSESSMENT    1:54 PM - Patient seen and examined at bedside. Discussed plan of care, including labs and imaging. Patient agrees to the plan of care.      2:59 PM I contacted the patient's daughter and informed them of the plan of care. The patient's daughter also informed me that the patient has no history of afib.    3:02 PM - I reevaluated the patient at bedside. The patient informs me they feel well. The patient still does not recall the syncopal episode. I informed the patient of my plan to admit today given the patient's current presentation and diagnostic study results. Patient verbalizes understanding and support with my plan for admission.     3:10 PM I discussed the patient's case and the above findings with Dr. Samuel (Hospitalist) who agreed to assess the patient for hospitalization.      COURSE & MEDICAL DECISION MAKING  Nursing notes, VS, PMSFHx reviewed in chart.    Patient is a 89-year-old male who comes in for evaluation of syncope.  Differential diagnosis includes vasovagal syncope, orthostatic hypotension, arrhythmia, electrolyte disturbance, intracranial abnormality.  Diagnostic work-up includes labs, EKG and CT of the head.    Patient's initial vitals are within normal limits, he has no focal neurologic deficits.  However patient is slightly confused and amnestic to the event.  EKG returns and demonstrates atrial fibrillation which appears to be new for him.  Labs returned and troponin is slightly elevated at 24.  Labs are otherwise unremarkable.  CT of the head demonstrates no acute findings.  Chest x-ray demonstrates linear  opacities, likely atelectasis, no leukocytosis this does not appear to be acute infection.  At this time unclear etiology of patient's syncope I do not believe his atrial fibrillation would have caused syncope.  However patient has had multiple syncopal events and therefore we will hospitalize him for ongoing management and work-up of syncope.  Discussed the case with Dr. Baugh who has accepted patient for hospitalization.  Patient is in guarded condition.    DISPOSITION:  Patient will be hospitalized by Dr. Samuel in guarded condition.      FINAL IMPRESSION  1. Syncope, unspecified syncope type    2. Atrial fibrillation, unspecified type (HCC)          Perry HERRERA (Scribmargot), am scribing for, and in the presence of, Cande Thurston M.D..    Electronically signed by: Perry Voss (Lilliam), 2/22/2022    Cande HERRERA M.D. personally performed the services described in this documentation, as scribed by Perry Voss in my presence, and it is both accurate and complete.     The note accurately reflects work and decisions made by me.  Cande Thurston M.D.  2/22/2022  4:54 PM

## 2022-02-22 NOTE — ASSESSMENT & PLAN NOTE
Had recent work-up with unrevealing echocardiogram.   Did have episode of hypotension with SBP in 80s in ED.     Previously seen by Cardiology who ruled out Afib and felt this is PACs.  Patient in Afib, HR controlled.   Became hypertensive with , resolved with resuming home metoprolol.    HASBLED score 4 and history of duodenal ulcers, has had recent falls and syncopal episodes, therefore I will hold off doing full anticoagulation. He can continue his aspirin and Lovenox SQ for DVT prophylaxis. Follow up with his cardiologist Dr. Javier.

## 2022-02-22 NOTE — H&P
Hospital Medicine History & Physical Note    Date of Service  2/22/2022    Primary Care Physician  Karthikeyan Chaudhry M.D.    Consultants      Code Status  DNAR/DNI    Chief Complaint  Chief Complaint   Patient presents with   • Syncope     No fall, syncopal episode sitting in wheelchair at [facility] for hip    • Hypotension       History of Presenting Illness  Navin Suazo is a 89 y.o. male who presented 2/22/2022 with Syncope (No fall, syncopal episode sitting in wheelchair at [facility] for hip ) and Hypotension  He has a history of CAD followed by Dr. Javier, CKD 3, hyperlipidemia, duodenal ulcers, recently evaluated for syncope on 2/2/2022 with consultation to cardiology who felt she had PACs, echo showed normal EF, no wall motion abnormality and thus conculded to be vasovagal (and hypotensive with renal failure) in nature, ORIF for R femoral fracture by Dr. Roman on 1/21/2022 discharged to rehab. Was at rehab then went to [facility] where he had syncopal episode at waiting room. Patient is a poor historian and could not recall what happened. Per EDP, he did lose consciousness and reportedly fell from the wheelchair.  LOC and fall per EMS ekg showed afib. Reportedly he had episodes of hypotension.   At the ED, afebrile, hemodynamically stable but did have one peisode of hypotension with SBP in the 80s per EDP.  CT head, no acute intracranial bleed.  Not hypoglycemic.  Reviewed EKG; irregular HR 70s however.    I discussed the plan of care with patient and bedside RN.    Review of Systems  Review of Systems   Unable to perform ROS: Mental acuity       Past Medical History   has a past medical history of Erectile dysfunction.    Surgical History   has a past surgical history that includes hernia repair; vasectomy; pr partial hip replacement (Right, 1/21/2022); and pr upper gi endoscopy,diagnosis (N/A, 1/31/2022).     Family History  family history includes Other in his father and mother.   Family history reviewed with  patient. There is no family history that is pertinent to the chief complaint.     Social History   reports that he has quit smoking. He has never used smokeless tobacco. He reports current alcohol use of about 4.2 oz of alcohol per week. He reports that he does not use drugs.    Allergies  No Known Allergies    Medications  Prior to Admission Medications   Prescriptions Last Dose Informant Patient Reported? Taking?   aspirin (ASA) 81 MG Chew Tab chewable tablet  Patient Yes No   Sig: Chew 81 mg every day.   atorvastatin (LIPITOR) 40 MG Tab  Patient Yes No   Sig: Take 40 mg by mouth every day.   docusate sodium 100 MG Cap   No No   Sig: Take 100 mg by mouth 2 times a day.   enoxaparin (LOVENOX) 40 MG/0.4ML Solution inj   No No   Sig: Inject 40 mg under the skin every day for 23 days.   ferrous sulfate 325 (65 Fe) MG tablet   No No   Sig: Take 1 Tablet by mouth every morning with breakfast.   folic acid (FOLVITE) 1 MG Tab   No No   Sig: Take 1 Tablet by mouth every day.   metoprolol tartrate (LOPRESSOR) 25 MG Tab   No No   Sig: Take 0.5 Tablets by mouth 2 times a day.   omeprazole (PRILOSEC) 40 MG delayed-release capsule   No No   Sig: Take 1 Capsule by mouth every day for 30 days.   therapeutic multivitamin-minerals (THERAGRAN-M) Tab  Patient Yes No   Sig: Take 1 Tablet by mouth every day.      Facility-Administered Medications: None       Physical Exam  Temp:  [36.2 °C (97.1 °F)] 36.2 °C (97.1 °F)  Pulse:  [74] 74  Resp:  [24] 24  BP: (113)/(66) 113/66  SpO2:  [96 %] 96 %  Blood Pressure : 113/66   Temperature: 36.2 °C (97.1 °F)   Pulse: 74   Respiration: (!) 24   Pulse Oximetry: 96 %       Physical Exam  Vitals and nursing note reviewed.   Constitutional:       Comments: Elderly   HENT:      Head: Normocephalic and atraumatic.      Right Ear: External ear normal.      Left Ear: External ear normal.      Nose: Nose normal.      Mouth/Throat:      Mouth: Mucous membranes are moist.   Eyes:      General: No scleral  icterus.     Conjunctiva/sclera: Conjunctivae normal.   Cardiovascular:      Rate and Rhythm: Normal rate. Rhythm irregular.      Heart sounds: Murmur heard.     No friction rub. No gallop.   Pulmonary:      Effort: Pulmonary effort is normal.      Breath sounds: Normal breath sounds.   Abdominal:      General: Abdomen is flat. Bowel sounds are normal. There is no distension.      Palpations: Abdomen is soft.      Tenderness: There is no abdominal tenderness. There is no guarding.   Musculoskeletal:         General: Normal range of motion.      Cervical back: Normal range of motion and neck supple.   Skin:     General: Skin is warm.   Neurological:      Mental Status: He is alert.      Comments: Poor insight  Confused  Amnestic  No gross motor deficits   Psychiatric:         Mood and Affect: Mood normal.         Behavior: Behavior normal.         Thought Content: Thought content normal.         Judgment: Judgment normal.         Laboratory:  Recent Labs     02/22/22  1415   WBC 8.5   RBC 4.31*   HEMOGLOBIN 14.4   HEMATOCRIT 45.1   .6*   MCH 33.4*   MCHC 31.9*   RDW 53.7*   PLATELETCT 280   MPV 9.4     Recent Labs     02/22/22  1415   SODIUM 137   POTASSIUM 4.3   CHLORIDE 98   CO2 23   GLUCOSE 131*   BUN 12   CREATININE 1.18   CALCIUM 9.5     Recent Labs     02/22/22  1415   ALTSGPT 18   ASTSGOT 30   ALKPHOSPHAT 131*   TBILIRUBIN 0.6   GLUCOSE 131*         No results for input(s): NTPROBNP in the last 72 hours.      Recent Labs     02/22/22  1415   TROPONINT 24*       Imaging:  CT-HEAD W/O   Final Result      1.  Diffuse atrophy and white matter changes.   2.  No acute intracranial hemorrhage or territorial infarct.            DX-CHEST-PORTABLE (1 VIEW)   Final Result      1.  There are hazy bibasilar linear opacities, left greater than right. This is probably due to atelectasis or scarring with pneumonitis considered less likely.          X-Ray:  My impression is: atelectasis    Assessment/Plan:  I anticipate  this patient is appropriate for observation status at this time.    * Syncope/amnesia, Afib  Assessment & Plan  Had recent work-up with unrevealing echocardiogram  Did have episode of hypotension. Currently kidney function decent. Start gentle hydration.  Last seen by Cardiology who ruled out Afib and felt thisois PACs. No report from EMS that he was in an arrhythmia. EKG today looks like Afib HR however controlled. Hold BB for now given soft BPs. His HASBLED score is 4 and he has a diagnosis of duodenal ulcers, and is a fall risk, therefore I will hold off doing full anticoagulation. He can continue his aspirin and Lovenox SQ for DVT prophylaxis. Discuss or follow up with his cardiologist Dr. Javier.  If still syncopal despite stable vitals and telemetry can consider neurologic w/u such as MRI and EEG.    Status post open reduction and internal fixation (ORIF) of femoral fracture  Assessment & Plan  Noted  Follow up with Orthopedics    Paroxysmal atrial fibrillation (HCC)  Assessment & Plan  As above. But monitor on telemetry overnight if he converts to sinus.    DNR (do not resuscitate)- (present on admission)  Assessment & Plan  Reviewed from recent hospitalizations and confirmed with patient despite limited capacity    CAD (coronary artery disease), native coronary artery- (present on admission)  Assessment & Plan  Noted. Continue aspirin and statin    Stage 3a chronic kidney disease (HCC)- (present on admission)  Assessment & Plan  Noted.    VTE prophylaxis: enoxaparin ppx

## 2022-02-22 NOTE — ASSESSMENT & PLAN NOTE
Continue home metoprolol.   No anticoagulation due to hx of duodenal ulcerations and high fall risk.   Lovenox for DVT ppx

## 2022-02-23 VITALS
TEMPERATURE: 98.8 F | HEIGHT: 71 IN | DIASTOLIC BLOOD PRESSURE: 75 MMHG | OXYGEN SATURATION: 95 % | WEIGHT: 162.04 LBS | BODY MASS INDEX: 22.69 KG/M2 | SYSTOLIC BLOOD PRESSURE: 149 MMHG | RESPIRATION RATE: 18 BRPM | HEART RATE: 73 BPM

## 2022-02-23 LAB
ANION GAP SERPL CALC-SCNC: 11 MMOL/L (ref 7–16)
BUN SERPL-MCNC: 11 MG/DL (ref 8–22)
CALCIUM SERPL-MCNC: 8.4 MG/DL (ref 8.5–10.5)
CHLORIDE SERPL-SCNC: 105 MMOL/L (ref 96–112)
CO2 SERPL-SCNC: 22 MMOL/L (ref 20–33)
CREAT SERPL-MCNC: 0.93 MG/DL (ref 0.5–1.4)
ERYTHROCYTE [DISTWIDTH] IN BLOOD BY AUTOMATED COUNT: 54 FL (ref 35.9–50)
GLUCOSE SERPL-MCNC: 108 MG/DL (ref 65–99)
HCT VFR BLD AUTO: 36 % (ref 42–52)
HGB BLD-MCNC: 11.1 G/DL (ref 14–18)
MCH RBC QN AUTO: 33 PG (ref 27–33)
MCHC RBC AUTO-ENTMCNC: 30.8 G/DL (ref 33.7–35.3)
MCV RBC AUTO: 107.1 FL (ref 81.4–97.8)
PLATELET # BLD AUTO: 231 K/UL (ref 164–446)
PMV BLD AUTO: 9.1 FL (ref 9–12.9)
POTASSIUM SERPL-SCNC: 3.7 MMOL/L (ref 3.6–5.5)
RBC # BLD AUTO: 3.36 M/UL (ref 4.7–6.1)
SODIUM SERPL-SCNC: 138 MMOL/L (ref 135–145)
WBC # BLD AUTO: 7.7 K/UL (ref 4.8–10.8)

## 2022-02-23 PROCEDURE — A9270 NON-COVERED ITEM OR SERVICE: HCPCS | Performed by: INTERNAL MEDICINE

## 2022-02-23 PROCEDURE — G0378 HOSPITAL OBSERVATION PER HR: HCPCS

## 2022-02-23 PROCEDURE — 97597 DBRDMT OPN WND 1ST 20 CM/<: CPT

## 2022-02-23 PROCEDURE — 700102 HCHG RX REV CODE 250 W/ 637 OVERRIDE(OP): Performed by: NURSE PRACTITIONER

## 2022-02-23 PROCEDURE — 96372 THER/PROPH/DIAG INJ SC/IM: CPT

## 2022-02-23 PROCEDURE — 85027 COMPLETE CBC AUTOMATED: CPT

## 2022-02-23 PROCEDURE — A9270 NON-COVERED ITEM OR SERVICE: HCPCS | Performed by: NURSE PRACTITIONER

## 2022-02-23 PROCEDURE — 80048 BASIC METABOLIC PNL TOTAL CA: CPT

## 2022-02-23 PROCEDURE — 97161 PT EVAL LOW COMPLEX 20 MIN: CPT

## 2022-02-23 PROCEDURE — 700102 HCHG RX REV CODE 250 W/ 637 OVERRIDE(OP): Performed by: INTERNAL MEDICINE

## 2022-02-23 PROCEDURE — 97165 OT EVAL LOW COMPLEX 30 MIN: CPT

## 2022-02-23 PROCEDURE — 700111 HCHG RX REV CODE 636 W/ 250 OVERRIDE (IP): Performed by: INTERNAL MEDICINE

## 2022-02-23 PROCEDURE — 99217 PR OBSERVATION CARE DISCHARGE: CPT | Mod: FS | Performed by: NURSE PRACTITIONER

## 2022-02-23 RX ADMIN — FOLIC ACID 1 MG: 1 TABLET ORAL at 06:00

## 2022-02-23 RX ADMIN — ENOXAPARIN SODIUM 40 MG: 40 INJECTION SUBCUTANEOUS at 06:00

## 2022-02-23 RX ADMIN — FERROUS SULFATE TAB 325 MG (65 MG ELEMENTAL FE) 325 MG: 325 (65 FE) TAB at 09:21

## 2022-02-23 RX ADMIN — Medication 1 TABLET: at 05:29

## 2022-02-23 RX ADMIN — OMEPRAZOLE 40 MG: 20 CAPSULE, DELAYED RELEASE ORAL at 05:29

## 2022-02-23 RX ADMIN — ASPIRIN 81 MG: 81 TABLET, CHEWABLE ORAL at 05:29

## 2022-02-23 RX ADMIN — METOPROLOL TARTRATE 12.5 MG: 25 TABLET, FILM COATED ORAL at 10:00

## 2022-02-23 ASSESSMENT — COGNITIVE AND FUNCTIONAL STATUS - GENERAL
HELP NEEDED FOR BATHING: A LITTLE
MOBILITY SCORE: 24
SUGGESTED CMS G CODE MODIFIER DAILY ACTIVITY: CJ
DAILY ACTIVITIY SCORE: 22
SUGGESTED CMS G CODE MODIFIER MOBILITY: CH
DRESSING REGULAR LOWER BODY CLOTHING: A LITTLE

## 2022-02-23 ASSESSMENT — ENCOUNTER SYMPTOMS
SENSORY CHANGE: 0
DEPRESSION: 0
ORTHOPNEA: 0
DIZZINESS: 0
MYALGIAS: 0
SHORTNESS OF BREATH: 0
FLANK PAIN: 0
SORE THROAT: 0
WEAKNESS: 0
DIARRHEA: 0
PALPITATIONS: 0
FEVER: 0
MEMORY LOSS: 1
HEADACHES: 0
CHILLS: 0
COUGH: 0
NERVOUS/ANXIOUS: 0
VOMITING: 0
NAUSEA: 0
ABDOMINAL PAIN: 0
FALLS: 0

## 2022-02-23 ASSESSMENT — GAIT ASSESSMENTS
ASSISTIVE DEVICE: FRONT WHEEL WALKER
DISTANCE (FEET): 200
GAIT LEVEL OF ASSIST: SUPERVISED

## 2022-02-23 ASSESSMENT — ACTIVITIES OF DAILY LIVING (ADL): TOILETING: INDEPENDENT
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
## 2022-02-23 NOTE — PROGRESS NOTES
Patient care assumed. Report received from Valeria KIM    Assessment completed. Pt A&Ox 4. Respirations are even and unlabored on RA. Pt denies pain at this time. Monitors applied, VS stable, call light and belongings within reach. POC updated (Fluids / monitor bp). Pt educated on room and call light, pt verbalized understanding. Bed alarm active, bed locked and in lowest position. Communication board updated. Needs met.

## 2022-02-23 NOTE — PROGRESS NOTES
Patient allowed RN to change socks, and assess foot wound. Images taken, but patient refused wound site care at this time. Wound consult already placed during previous shift.

## 2022-02-23 NOTE — DISCHARGE PLANNING
Received request for C. Spoke with patient at bedside and choice form filled out and signed. Verified all information. Choice form faxed to [facility]. Message sent to [facility] to update.
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
## 2022-02-23 NOTE — WOUND TEAM
[facility]  Inpatient Services  Initial Wound and Skin Care Evaluation    Admission Date: 2/22/2022     Last order of IP CONSULT TO WOUND CARE was found on 2/22/2022 from Hospital Encounter on 2/22/2022     HPI, PMH, SH: Reviewed    Past Surgical History:   Procedure Laterality Date   • OR UPPER GI ENDOSCOPY,DIAGNOSIS N/A 1/31/2022    Procedure: GASTROSCOPY;  Surgeon: Ti Foster M.D.;  Location: SURGERY SAME DAY [facility];  Service: Gastroenterology   • PB PARTIAL HIP REPLACEMENT Right 1/21/2022    Procedure: HEMIARTHROPLASTY, HIP;  Surgeon: Lloyd Roman M.D.;  Location: SURGERY [facility];  Service: Orthopedics   • HERNIA REPAIR     • VASECTOMY       Social History     Tobacco Use   • Smoking status: Former Smoker   • Smokeless tobacco: Never Used   Substance Use Topics   • Alcohol use: Yes     Alcohol/week: 4.2 oz     Types: 7 Standard drinks or equivalent per week     Comment: one per day     Chief Complaint   Patient presents with   • Syncope     No fall, syncopal episode sitting in wheelchair at [facility] for hip    • Hypotension     Diagnosis: Syncope and collapse [R55]    Unit where seen by Wound Team: T214/00     WOUND CONSULT/FOLLOW UP RELATED TO:  Left hallux     WOUND HISTORY:  Wound managed by podiatrist OP.     WOUND ASSESSMENT/LDA  Wound 01/20/22 Skin Tear Hand Anterior Right (Active)   Number of days: 34       Wound 01/20/22 Other (comment) Foot Distal Left (Active)   Number of days: 34       Wound 01/21/22 Incision Leg Right [place], mepilex dressing.  (Active)   Number of days: 33       Wound 02/22/22 Toe, Hallux Left ulcer (Active)   Wound Image    02/23/22 1100   Site Assessment Red;[place] 02/23/22 1100   Periwound Assessment Clean;Dry;Intact;Callused 02/23/22 1100   Margins Undefined edges 02/23/22 1100   Closure Secondary intention 02/23/22 1100   Drainage Amount Scant 02/23/22 1100   Drainage Description Serosanguineous 02/23/22 1100   Treatments Cleansed;Irrigation;Site  care;CSWD - Conservative Sharp Wound Debridement 02/23/22 1100   Wound Cleansing Approved Wound Cleanser 02/23/22 1100   Periwound Protectant Skin Protectant Wipes to Periwound 02/23/22 1100   Dressing Cleansing/Solutions Not Applicable 02/23/22 1100   Dressing Options Hydrofera Blue Ready;Hypafix Tape 02/23/22 1100   Dressing Changed New 02/23/22 1100   Dressing Status Clean;Dry;Intact 02/23/22 1100   Dressing Change/Treatment Frequency Every 72 hrs, and As Needed 02/23/22 1100   NEXT Dressing Change/Treatment Date 02/26/22 02/23/22 1100   NEXT Weekly Photo (Inpatient Only) 03/02/22 02/23/22 1100   Non-staged Wound Description Full thickness 02/23/22 1100   Wound Length (cm) 0.5 cm 02/23/22 1100   Wound Width (cm) 0.5 cm 02/23/22 1100   Wound Depth (cm) 0.2 cm 02/23/22 1100   Wound Surface Area (cm^2) 0.25 cm^2 02/23/22 1100   Wound Volume (cm^3) 0.05 cm^3 02/23/22 1100   Shape circular 02/23/22 1100   Wound Odor None 02/23/22 1100   Pulses Left;1+;DP;PT 02/23/22 1100   Right Foot Monofilament 10-point exam (Sensate) 0/10 02/23/22 1100   Left Foot Monofilament 10-point exam (Sensate) 0/10 02/23/22 1100   Exposed Structures None 02/23/22 1100   WOUND NURSE ONLY - Time Spent with Patient (mins) 15 02/23/22 1100   Number of days: 1        Vascular:    DENISE:   No results found.    Lab Values:    Lab Results   Component Value Date/Time    WBC 7.7 02/23/2022 01:24 AM    RBC 3.36 (L) 02/23/2022 01:24 AM    HEMOGLOBIN 11.1 (L) 02/23/2022 01:24 AM    HEMATOCRIT 36.0 (L) 02/23/2022 01:24 AM    HBA1C 5.8 (H) 03/17/2015 11:26 AM        Culture Results show:  No results found for this or any previous visit (from the past 720 hour(s)).    Pain Level/Medicated:  0/10       INTERVENTIONS BY WOUND TEAM:  Chart and images reviewed. Discussed with bedside RN. All areas of concern (based on picture review, LDA review and discussion with bedside RN) have been thoroughly assessed. Documentation of areas based on significant findings.  This RN in to assess patient. Performed standard wound care which includes appropriate positioning, dressing removal and non-selective debridement. Pictures and measurements obtained weekly if/when required.  Preparation for Dressing removal: SHAWNA  Non-selectively Debrided with:  wound cleanser and gauze.  Sharp debridement: CSWD with scissors and forceps to remove <20cm2 of non-viable tissue and slough from wound bed.   [place] wound: Cleansed with wound cleanser and gauze, Prepped with no sting skin prep  Primary Dressing: Hydrofera blue  Secondary (Outer) Dressing: hypafix tape    Interdisciplinary consultation: Patient, Bedside RN (Valeria), Elida DOUGLAS    EVALUATION / RATIONALE FOR TREATMENT:  Most Recent Date:  2/23/22: Patient has small full thickness wound with callus to left hallux.  Debridement of the callus and superficial slough from wound bed.  Hydrofera Blue applied for the hydrophilic polyurethane foam which contains ethylene oxide used as a bactericidal, fungicidal, and sporicidal disinfectant. Hydrofera Blue also aids in maintaining a moist wound environment. The absorption properties of this dressing are important in collecting exudates and bacteria from the injured area. These harmful fluid secretions bind to the dressing removing it from the wound without the foam sticking to the wound causing more harm.                 Goals: Steady decrease in wound area and depth weekly.    WOUND TEAM PLAN OF CARE ([X] for frequency of wound follow up,):   Nursing to follow orders written for wound care. Contact wound team if area fails to progress, deteriorates or with any questions/concerns  Dressing changes by wound team:                   Follow up 3 times weekly:                NPWT change 3 times weekly:     Follow up 1-2 times weekly:      Follow up Bi-Monthly:                   Follow up as needed:   x  Other (explain):     NURSING PLAN OF CARE ORDERS (X):  Dressing changes: See Dressing Care orders:  x  Skin care: See Skin Care orders:   RN Prevention Protocol:   Rectal tube care: See Rectal Tube Care orders:   Other orders:    RSKIN:   CURRENTLY IN PLACE (X), APPLIED THIS VISIT (A), ORDERED (O):   Q shift Caden:  X  Q shift pressure point assessments:  X    Surface/Positioning   Pressure redistribution mattress    x        Low Airloss          Bariatric foam      Bariatric JOSE DE JESUS     Waffle cushion        Waffle Overlay          Reposition q 2 hours      TAPs Turning system     Z Joe Pillow     Offloading/Redistribution   Sacral Mepilex (Silicone dressing)     Heel Mepilex (Silicone dressing)         Heel float boots (Prevalon boot)             Float Heels off Bed with Pillows           Respiratory NA  Silicone O2 tubing         Gray Foam Ear protectors     Cannula fixation Device (Tender )          High flow offloading Clip    Elastic head band offloading device      Anchorfast                                                         Trach with Optifoam split foam             Containment/Moisture Prevention NA    Rectal tube or BMS    Purwick/Condom Cath        Raya Catheter    Barrier wipes           Barrier paste       Antifungal tx      Interdry        Mobilization       Up to chair    x    Ambulate      PT/OT      Nutrition NA      Dietician        Diabetes Education      PO     TF     TPN     NPO   # days     Other        Anticipated discharge plans:   LTACH:        SNF/Rehab:                  Home Health Care:           Outpatient Wound Center:            Self/Family Care:   x     Other:                  Vac Discharge Needs: NA  Not Applicable Pt not on a wound vac:     x  Regular Vac while inpatient, alternative dressing at DC:        Regular Vac in use and continued at DC:            Reg. Vac w/ Skin Sub/Biologic in use. Will need to be changed 2x wkly:      Veraflo Vac while inpatient, ok to transition to Regular Vac on Discharge:           Veraflo Vac while inpatient, will need to remain on Veraflo  Vac upon discharge:

## 2022-02-23 NOTE — CARE PLAN
Problem: Knowledge Deficit - Standard  Goal: Patient and family/care givers will demonstrate understanding of plan of care, disease process/condition, diagnostic tests and medications  Outcome: Progressing     Problem: Pain - Standard  Goal: Alleviation of pain or a reduction in pain to the patient’s comfort goal  Outcome: Progressing     Problem: Fall Risk  Goal: Patient will remain free from falls  Outcome: Progressing   The patient is Stable - Low risk of patient condition declining or worsening    Shift Goals  Clinical Goals: BP control, discharge  Patient Goals: discharge  Family Goals: n/a    Progress made toward(s) clinical / shift goals:  PT/OT, restart metop    Patient is not progressing towards the following goals:

## 2022-02-23 NOTE — CARE PLAN
The patient is Watcher - Medium risk of patient condition declining or worsening    Shift Goals  Clinical Goals: fluids / monitor bp  Patient Goals: feel better  Family Goals: not present      Problem: Knowledge Deficit - Standard  Goal: Patient and family/care givers will demonstrate understanding of plan of care, disease process/condition, diagnostic tests and medications  Outcome: Progressing     Problem: Pain - Standard  Goal: Alleviation of pain or a reduction in pain to the patient’s comfort goal  Outcome: Progressing     Problem: Fall Risk  Goal: Patient will remain free from falls  Outcome: Progressing

## 2022-02-23 NOTE — CARE PLAN
Problem: Knowledge Deficit - Standard  Goal: Patient and family/care givers will demonstrate understanding of plan of care, disease process/condition, diagnostic tests and medications  Outcome: Progressing     Problem: Pain - Standard  Goal: Alleviation of pain or a reduction in pain to the patient’s comfort goal  Outcome: Progressing   The patient is Stable - Low risk of patient condition declining or worsening    Shift Goals  Clinical Goals: IV fluids  Patient Goals: to feel better  Family Goals: n/a    Progress made toward(s) clinical / shift goals:  IV fluids started    Patient is not progressing towards the following goals:

## 2022-02-23 NOTE — THERAPY
"Physical Therapy   Initial Evaluation     Patient Name: Navin Suazo  Age:  89 y.o., Sex:  male  Medical Record #: [id number]  Today's Date: 2/23/2022          Assessment  Patient is 89 y.o. male admitted w/ syncope.  Recent ORIF right femur on 1/21/2022, d/c to snf.  Hx of CAD, CKD 3, peptic ulcers.  He is WBAT per previous PT note w/ posterior precautions.  He lives w/ his wife in a two story house, he was indep w/ a fww.  Today, he is rec'd alert, in bed, agreeable to work w/ PT.  He is able to move in/out of bed w/o assist.  He is able to stand and ambulate w/ a fww 200 ft w/o assist.  He reports the stairs are \"no problem\" as he has two rails.  He appears to be at his baseline.  No acute PT needs.  PT for d/c needs.  Plan    Recommend Physical Therapy for Evaluation only   DC Equipment Recommendations: None  Discharge Recommendations: Recommend home health for continued physical therapy services   Objective       02/23/22 0807   Prior Living Situation   Housing / Facility 2 Story House   Steps Into Home 0   Steps In Home 5   Rail Both Rail (Steps in Home)   Equipment Owned Front-Wheel Walker   Lives with - Patient's Self Care Capacity Spouse   Prior Level of Functional Mobility   Bed Mobility Independent   Transfer Status Independent   Ambulation Independent   Assistive Devices Used Front-Wheel Walker   Stairs Independent   Balance Assessment   Sitting Balance (Static) Fair +   Sitting Balance (Dynamic) Fair +   Standing Balance (Static) Fair   Standing Balance (Dynamic) Fair   Weight Shift Sitting Good   Weight Shift Standing Good   Gait Analysis   Gait Level Of Assist Supervised   Assistive Device Front Wheel Walker   Distance (Feet) 200   Bed Mobility    Supine to Sit Supervised   Sit to Supine Supervised   Functional Mobility   Sit to Stand Supervised   Anticipated Discharge Equipment and Recommendations   DC Equipment Recommendations None   Discharge Recommendations Recommend home health for continued " physical therapy services

## 2022-02-23 NOTE — FACE TO FACE
Face to Face Supporting Documentation - Home Health    The encounter with this patient was in whole or in part the primary reason for home health admission.    Date of encounter:   Patient:                    MRN:                       YOB: 2022  Navin Suazo  [id number]  1/11/1932     Home health to see patient for:  Physical Therapy evaluation and treatment and Occupational therapy evaluation and treatment    Skilled need for:  Surgical Aftercare from right hip ORIF    Skilled nursing interventions to include:  Comment: n/a    Homebound status evidenced by:  Need the aid of supportive devices such as crutches, canes, wheelchairs or walkers or Needs the assistance of another person in order to leave the home. Leaving home requires a considerable and taxing effort. There is a normal inability to leave the home.    Community Physician to provide follow up care: Karthikeyan Chaudhry M.D.     Optional Interventions? No      I certify the face to face encounter for this home health care referral meets the CMS requirements and the encounter/clinical assessment with the patient was, in whole, or in part, for the medical condition(s) listed above, which is the primary reason for home health care. Based on my clinical findings: the service(s) are medically necessary, support the need for home health care, and the homebound criteria are met.  I certify that this patient has had a face to face encounter by myself.  TARYN Flood. - NPI: [id number]

## 2022-02-23 NOTE — PROGRESS NOTES
4 Eyes Skin Assessment Completed by  Valeria, RN and JUDY Streeter.    Head WDL  Ears WDL  Nose WDL  Mouth WDL  Neck WDL  Breast/Chest WDL  Shoulder Blades WDL  Spine WDL  (R) Arm/Elbow/Hand WDL  (L) Arm/Elbow/Hand WDL  Abdomen WDL  Groin WDL  Scrotum/Coccyx/Buttocks WDL  (R) Leg Scar  (L) Leg WDL  (R) Heel/Foot/Toe WDL  (L) Heel/Foot/Toe Ulcer(s)          Devices In Places Tele Box and Pulse Ox      Interventions In Place N/A    Possible Skin Injury Yes    Pictures Uploaded Into Epic No, needs to be completed, pt refused to let RN take down dressing   Wound Consult Placed Yes  RN Wound Prevention Protocol Ordered No

## 2022-02-23 NOTE — THERAPY
"Occupational Therapy   Initial Evaluation     Patient Name: Navin Suazo  Age:  89 y.o., Sex:  male  Medical Record #: [id number]  Today's Date: 2/23/2022       Precautions: Fall Risk,Posterior Hip Precautions,Weight Bearing As Tolerated Right Lower Extremity    Assessment  Patient is 89 y.o. male with a past medical history of recent ORIF right femur on 1/21/2022, CAD, CKD, hyperlipidemia, duodenal ulcers admitted 2/22/2022 for syncope while sitting..  Additional factors influencing patient status / progress: Per chart review pt was WBAT RLE with Post hip precautions.  Pt has been at [facility] since the end of Jan.  Today pt is supervised with bed mobility & ADL transfers.  Pt was educated on Post Hip precautions & provided with a written handout.  Pt does not have good recall of his hip precautions, despite being educated previously. Pt appears to have cognitive impairments.  Pt stated he has a LH reacher & LH shoe horn for home use?  Pt was not able to adhere to his Post hip precautions during LB dressing today.  Given pt's previous stay at SNF & his current function pt is likely functioning close to his baseline.  Pt will need supervision during LB dressing upon D/C home.    Patient will not be actively followed for occupational therapy services at this time, however may be seen if requested by physician for 1 more visit within 30 days to address any discharge or equipment needs.        Plan    Recommend Occupational Therapy for Evaluation only.    DC Equipment Recommendations: Tub / Shower Seat,Hand Held Shower  Discharge Recommendations: Recommend home health for continued occupational therapy services     Subjective    \"I'll be fine once I get home, my daughter will help if I need it\"     Objective       02/23/22 1021   Prior Living Situation   Prior Services Intermittent Physical Support for ADL Per Family   Housing / Facility 2 Story House   Steps Into Home 0   Steps In Home 12   Rail Both Rail (Steps in Home) "   Bathroom Set up Walk In Shower;Grab Bars   Equipment Owned Front-Wheel Walker;Grab Bar(s) In Tub / Shower  (pt stated his son put in taller toilets?)   Lives with - Patient's Self Care Capacity Spouse   Comments Per chart & discussion with Nsg pt's daughter is to come pick pt up today for D/C.  Pt was not a reliable historian.  Pt has been at SNF since the end of Jan following his GLF & hip fx.   Cognition    Cognition / Consciousness X   Speech/ Communication Delayed Responses;Hard of Hearing   Safety Awareness Impaired   New Learning Impaired   Comments pt unable to recall his Post hip precautions & has not been able to demonstrate the precautions during ADL's today despite being at SNF for the past 3 weeks   Balance Assessment   Sitting Balance (Static) Good   Sitting Balance (Dynamic) Fair +   Standing Balance (Static) Fair   Standing Balance (Dynamic) Fair -   Weight Shift Sitting Good   Weight Shift Standing Fair   Bed Mobility    Supine to Sit Supervised   Sit to Supine Supervised   Scooting Supervised   Rolling Supervised   ADL Assessment   Eating Modified Independent   Grooming Supervision;Standing   Upper Body Dressing Supervision   Lower Body Dressing Minimal Assist   Toileting Supervision   Comments pt stated he has a LH reacher & LH shoe horn at home   Functional Mobility   Sit to Stand Supervised   Bed, Chair, Wheelchair Transfer Supervised   Toilet Transfers Supervised   Mobility pt able to amb with FWW to bathroom with supervision   Session Information   Date / Session Number  2/22- D/C needs only

## 2022-02-23 NOTE — DISCHARGE PLANNING
Received Choice form at 1400  Agency/Facility Name: [facility]  Referral sent per Choice form @ [phone]
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
## 2022-02-24 NOTE — DISCHARGE INSTRUCTIONS
Syncope  Syncope is when you pass out (faint) for a short time. It is caused by a sudden decrease in blood flow to the brain. Signs that you may be about to pass out include:  · Feeling dizzy or light-headed.  · Feeling sick to your stomach (nauseous).  · Seeing all white or all black.  · Having cold, clammy skin.  If you pass out, get help right away. Call your local emergency services (911 in the [place] Do not drive yourself to the hospital.  Follow these instructions at home:  Watch for any changes in your symptoms. Take these actions to stay safe and help with your symptoms:  Lifestyle  · Do not drive, use machinery, or play sports until your doctor says it is okay.  · Do not drink alcohol.  · Do not use any products that contain nicotine or tobacco, such as cigarettes and e-cigarettes. If you need help quitting, ask your doctor.  · Drink enough fluid to keep your pee (urine) pale yellow.  General instructions  · Take over-the-counter and prescription medicines only as told by your doctor.  · If you are taking blood pressure or heart medicine, sit up and stand up slowly. Spend a few minutes getting ready to sit and then stand. This can help you feel less dizzy.  · Have someone stay with you until you feel stable.  · If you start to feel like you might pass out, lie down right away and raise (elevate) your feet above the level of your heart. Breathe deeply and steadily. Wait until all of the symptoms are gone.  · Keep all follow-up visits as told by your doctor. This is important.  Get help right away if:  · You have a very bad headache.  · You pass out once or more than once.  · You have pain in your chest, belly, or back.  · You have a very fast or uneven heartbeat (palpitations).  · It hurts to breathe.  · You are bleeding from your mouth or your bottom (rectum).  · You have black or tarry poop (stool).  · You have jerky movements that you cannot control (seizure).  · You are confused.  · You have trouble  walking.  · You are very weak.  · You have vision problems.  These symptoms may be an emergency. Do not wait to see if the symptoms will go away. Get medical help right away. Call your local emergency services (911 in the [place] Do not drive yourself to the hospital.  Summary  · Syncope is when you pass out (faint) for a short time. It is caused by a sudden decrease in blood flow to the brain.  · Signs that you may be about to faint include feeling dizzy, light-headed, or sick to your stomach, seeing all white or all black, or having cold, clammy skin.  · If you start to feel like you might pass out, lie down right away and raise (elevate) your feet above the level of your heart. Breathe deeply and steadily. Wait until all of the symptoms are gone.  This information is not intended to replace advice given to you by your health care provider. Make sure you discuss any questions you have with your health care provider.  Discharge Instructions    Discharged to home by car with relative. Discharged via wheelchair, hospital escort: Yes.  Special equipment needed: Not Applicable    Be sure to schedule a follow-up appointment with your primary care doctor or any specialists as instructed.     Discharge Plan:   Diet Plan: Discussed  Activity Level: Discussed  Confirmed Follow up Appointment: Patient to Call and Schedule Appointment  Confirmed Symptoms Management: Discussed  Medication Reconciliation Updated: Yes  Influenza Vaccine Indication: Not indicated: Previously immunized this influenza season and > 8 years of age    I understand that a diet low in cholesterol, fat, and sodium is recommended for good health. Unless I have been given specific instructions below for another diet, I accept this instruction as my diet prescription.   Other diet: regular    Special Instructions: None    · Is patient discharged on Warfarin / Coumadin?   No     Depression / Suicide Risk    As you are discharged from [facility]  facility, it is important to learn how to keep safe from harming yourself.    Recognize the warning signs:  · Abrupt changes in personality, positive or negative- including increase in energy   · Giving away possessions  · Change in eating patterns- significant weight changes-  positive or negative  · Change in sleeping patterns- unable to sleep or sleeping all the time   · Unwillingness or inability to communicate  · Depression  · Unusual sadness, discouragement and loneliness  · Talk of wanting to die  · Neglect of personal appearance   · Rebelliousness- reckless behavior  · Withdrawal from people/activities they love  · Confusion- inability to concentrate     If you or a loved one observes any of these behaviors or has concerns about self-harm, here's what you can do:  · Talk about it- your feelings and reasons for harming yourself  · Remove any means that you might use to hurt yourself (examples: pills, rope, extension cords, firearm)  · Get professional help from the community (Mental Health, Substance Abuse, psychological counseling)  · Do not be alone:Call your Safe Contact- someone whom you trust who will be there for you.  · Call your local CRISIS HOTLINE 494-6244 or 421-865-7921  · Call your local Children's Mobile Crisis Response Team [place] (161) 716-3711 or www.[employer]  · Call the toll free National Suicide Prevention Hotlines   · National Suicide Prevention Lifeline 993-051-KAHI (7084)  · National Hope Line Network 800-SUICIDE (463-9243)

## 2022-02-24 NOTE — PROGRESS NOTES
Pt was discharged with discharge paperwork and wound supplies. Pts son at bedside. Reinforced hip precautions with pt and son and instructed them about appointments and wound care. Pt and son acknowledged instructions. Pt was escorted off the floor in wheelchair by RN and pt helped to son's car.

## 2022-02-28 NOTE — DISCHARGE PLANNING
Received message from [facility] Supervisor that daughter Kaitlynn was calling and wanted someone to call her back. Called Kaitlynn @ [phone] and spoke with her. Kaitlynn had question about D/C paperwork and medications. CM updated Kaitlynn about [facility] and gave [place]'s information and told her CM would escalate to Nursing and have someone call her regarding D/C instructions and Meds. Spoke with Terri Aquino and updated about daughters question.

## 2022-08-22 PROBLEM — M25.512 CHRONIC PAIN OF BOTH SHOULDERS: Status: ACTIVE | Noted: 2022-08-18

## 2022-08-22 PROBLEM — R41.89 COGNITIVE DECLINE: Status: ACTIVE | Noted: 2022-08-18

## 2022-08-22 PROBLEM — S72.031D: Status: ACTIVE | Noted: 2022-02-02

## 2022-08-22 PROBLEM — R29.898 WEAKNESS OF BOTH LOWER EXTREMITIES: Status: ACTIVE | Noted: 2022-08-18

## 2022-08-22 PROBLEM — Z96.641 PRESENCE OF RIGHT ARTIFICIAL HIP JOINT: Status: ACTIVE | Noted: 2022-02-02

## 2022-08-22 PROBLEM — G89.29 CHRONIC PAIN OF BOTH SHOULDERS: Status: ACTIVE | Noted: 2022-08-18

## 2022-08-22 PROBLEM — Z91.81 RISK FOR FALLS: Status: ACTIVE | Noted: 2022-08-18

## 2022-08-22 PROBLEM — K26.3 ACUTE DUODENAL ULCER WITHOUT HEMORRHAGE OR PERFORATION: Status: ACTIVE | Noted: 2022-02-02

## 2022-08-22 PROBLEM — M25.511 CHRONIC PAIN OF BOTH SHOULDERS: Status: ACTIVE | Noted: 2022-08-18

## 2022-09-14 PROBLEM — N39.0 URINARY TRACT INFECTION WITH HEMATURIA: Status: ACTIVE | Noted: 2022-08-31

## 2022-09-14 PROBLEM — R31.9 URINARY TRACT INFECTION WITH HEMATURIA: Status: ACTIVE | Noted: 2022-08-31

## 2022-12-30 PROBLEM — S06.0X0S: Status: ACTIVE | Noted: 2022-09-27

## 2023-01-20 ENCOUNTER — HOSPITAL ENCOUNTER (OUTPATIENT)
Facility: MEDICAL CENTER | Age: 88
End: 2023-01-20
Attending: NURSE PRACTITIONER
Payer: MEDICARE

## 2023-01-20 LAB
FORWARD REASON: SPWHY: NORMAL
FORWARD REASON: SPWHY: NORMAL
FORWARDED TO LAB: SPWHR: NORMAL
FORWARDED TO LAB: SPWHR: NORMAL
SPECIMEN SENT (2ND): SPWT2: NORMAL
SPECIMEN SENT (3RD): SPWT3: NORMAL
SPECIMEN SENT: SPWT1: NORMAL
SPECIMEN SENT: SPWT1: NORMAL

## 2023-02-05 PROBLEM — K26.3 ACUTE DUODENAL ULCER WITHOUT HEMORRHAGE OR PERFORATION: Status: RESOLVED | Noted: 2022-02-02 | Resolved: 2023-02-05

## 2023-02-05 PROBLEM — L97.511 SKIN ULCER OF TOE OF RIGHT FOOT, LIMITED TO BREAKDOWN OF SKIN (HCC): Status: ACTIVE | Noted: 2023-01-27

## 2023-02-05 PROBLEM — G62.2 PERIPHERAL NEUROPATHY CAUSED BY TOXIN (HCC): Status: ACTIVE | Noted: 2023-01-27

## 2023-02-20 NOTE — THERAPY
"Occupational Therapy  Daily Treatment     Patient Name: Navin Suazo  Age:  89 y.o., Sex:  male  Medical Record #: [id number]  Today's Date: 1/27/2022       Precautions: Fall Risk,Posterior Hip Precautions,Weight Bearing As Tolerated Right Lower Extremity  Comments: [place]     Assessment    Pt seen for OT tx. Pt mobilized to EOB and completed 1 sit <> stand. Pt trained on LB dressing using AE; demo difficulty coordinated AE. Pt demos improved cognition overall, but continues to have deficits. Pt was able to state 3/3 of spinal precautions, but require intermittent VC to follow. Pt completed seated grooming. Declined up to chair due to fatigue. Pt is making progress towards goals, but continues to be limited by: decreased activity tolerance, decreased functional mobility, decreased balance, & cognitive deficits. Will continue to benefit from acute OT.     Plan    Continue current treatment plan.    DC Equipment Recommendations: Unable to determine at this time  Discharge Recommendations: Recommend post-acute placement for additional occupational therapy services prior to discharge home    Subjective    \"I do not want to get up, I am too tired.\"      Objective       01/27/22 1750   Cognition    Level of Consciousness Alert   Safety Awareness Impaired   New Learning Impaired   Attention Impaired   Sequencing Impaired   Comments Improved cognition overall, but continues to have deficits.   Other Treatments   Other Treatments Provided Ongoing education on posterior hip precautions    Balance   Sitting Balance (Static) Fair +   Sitting Balance (Dynamic) Fair   Standing Balance (Static) Poor +   Standing Balance (Dynamic) Poor   Weight Shift Sitting Fair   Weight Shift Standing Poor   Comments FWW   Bed Mobility    Supine to Sit Contact Guard Assist   Sit to Supine Contact Guard Assist   Scooting Standby Assist   Activities of Daily Living   Grooming Supervision;Seated  (Oral care, hair combing )   Lower Body Dressing " Moderate Assist  (doff/don socks with AE)   Toileting   (NT; declined need)   Functional Mobility   Sit to Stand Moderate Assist   Bed, Chair, Wheelchair Transfer Refused  (due to fatigue )   Toilet Transfers   (NT; declined need)   Mobility EOB <> standing with FWW, seated scooting    Short Term Goals   Short Term Goal # 1 pt will complete grooming standing at sink w/spv    Goal Outcome # 1 Progressing slower than expected   Short Term Goal # 2 pt will complete toilet txf w/spv    Goal Outcome # 2   (NT this tx session)   Short Term Goal # 3 pt will complete LB dressing w/use of AE and spv    Goal Outcome # 3 Progressing as expected          31-Jan-2023 01:41

## 2023-03-20 PROBLEM — Z13.9 ENCOUNTER FOR SCREENING INVOLVING SOCIAL DETERMINANTS OF HEALTH (SDOH): Status: ACTIVE | Noted: 2023-03-20

## 2023-04-17 PROBLEM — R44.3 HALLUCINATIONS: Status: ACTIVE | Noted: 2023-04-17

## 2023-05-15 PROBLEM — F29 PSYCHOSIS (HCC): Status: ACTIVE | Noted: 2023-04-17

## 2023-05-16 PROBLEM — F01.52 VASCULAR DEMENTIA WITH DELUSIONS (HCC): Status: ACTIVE | Noted: 2023-05-15

## 2023-05-16 PROBLEM — I65.1 VERTEBROBASILAR DOLICHOECTASIA: Status: ACTIVE | Noted: 2023-05-15

## 2023-05-16 PROBLEM — I10 PRIMARY HYPERTENSION: Status: ACTIVE | Noted: 2023-05-15

## 2023-05-16 PROBLEM — F01.B0 MODERATE VASCULAR DEMENTIA (HCC): Status: ACTIVE | Noted: 2023-05-15

## 2023-06-12 PROBLEM — R55 NEAR SYNCOPE: Status: ACTIVE | Noted: 2023-06-10

## 2023-06-12 PROBLEM — R11.2 INTRACTABLE NAUSEA AND VOMITING: Status: ACTIVE | Noted: 2023-06-10

## 2023-06-15 PROBLEM — K52.9 ACUTE GASTROENTERITIS: Status: ACTIVE | Noted: 2023-06-12

## 2023-06-15 PROBLEM — I12.9 BENIGN HYPERTENSION WITH CKD (CHRONIC KIDNEY DISEASE) STAGE III: Status: ACTIVE | Noted: 2023-05-15

## 2023-06-15 PROBLEM — N18.30 BENIGN HYPERTENSION WITH CKD (CHRONIC KIDNEY DISEASE) STAGE III: Status: ACTIVE | Noted: 2023-05-15

## 2023-06-15 PROBLEM — R39.81 FUNCTIONAL URINARY INCONTINENCE: Status: ACTIVE | Noted: 2023-06-12

## 2023-07-25 PROBLEM — N39.0 RECURRENT UTI: Status: ACTIVE | Noted: 2023-07-25

## 2025-07-14 NOTE — PROGRESS NOTES
[facility]                                     Progress Note  /  Patient Name: Sherly Yeung  Date: 7/14/2025         Service Type: Individual      Session Start Time:8:02 Session End Time: 8:58    Session Length: 56    Session #: 33    Attendees: Client attended alone    Service Modality:  Video Visit:      Provider verified identity through the following two step process.  Patient provided:  Patient is known previously to provider    Telemedicine Visit: The patient's condition can be safely assessed and treated via synchronous audio and visual telemedicine encounter.      Reason for Telemedicine Visit: Services only offered telehealth    Originating Site (Patient Location): Patient's home    Distant Site (Provider Location): Provider Remote Setting- Home Office    Consent:  The patient/guardian has verbally consented to: the potential risks and benefits of telemedicine (video visit) versus in person care; bill my insurance or make self-payment for services provided; and responsibility for payment of non-covered services.     Patient would like the video invitation sent by:  My Chart    Mode of Communication:  Video Conference via Amwell    Distant Location (Provider):  Off-site    As the provider I attest to compliance with applicable laws and regulations related to telemedicine.    DATA  Extended Session (53+ minutes):   - Patient's presenting concerns require more intensive intervention than could be completed within the usual service-family issues involving relationship with her son due her MH issues and memory issues from ECT intervention. Needed more time to process her sadness around this.   Interactive Complexity: No  Crisis: No      Progress Since Last Session (Related to Symptoms / Goals / Homework   Symptoms: Worsening : depressed mood, anxious, sad    Homework: Partially completed      Episode of Care Goals: Satisfactory progress - ACTION (Actively working towards change);  Pt off the floor for surgery    Intervened by reinforcing change plan / affirming steps taken     Current / Ongoing Stressors and Concerns: Patient reported having been experiencing several years reactions from the dry mouth medication from her dentist which she then decided to stop using.  Much has been going on in her life specifically feeling so anxious when it comes to her relationship with her 32-year-old son.  Patient has been trying to rebuild the relationship with her son who has left her to his father after the parents .  At the time the patient was severely depressed and was receiving ECT.  Patient does not recall any of the report from the son on how she might have thrown him out from the house.  Patient shares how this has been deeply affecting her and wanting to figure out how to address this issue.  She notes that part of her memory has been erased by the ACT.  Patient will indicate to her son about the effect of the depression during her time with him and the intervention that that she stated that had impact on her memory.  Patient with reflect on the discussion today on how she can prepare a conversation with her son.  Patient to practice some mind for exercise during the session to ground herself which she is eager to continue on her own daily.  Otherwise, she states that she had just started working on organizing her home and cleaning and had come to the conclusion about her own reaction to her 's actions especially when she is not involved in the decision making.  Patient notes that she has been working hard on this and she feels better at this time around.  No other concerns discussed today.  No safety concern reported today.  Her next visit is on August 20.  Patient will review the boundary tool, in a critic too, and will complete her previous home assignments that she has not been able to get to.         Treatment Objective(s) Addressed in This Session:       Intervention:  DBT: Introduced the patient to  mindfulness exercise and encouraged the patient to continue to use them daily; discussed about boundary tool as well as the Inner critic video.  These were also sent via my chart.    MI Intervention: Expressed Empathy/Understanding, Supported Autonomy, Collaboration, Evocation, Permission to raise concern or advise, and Open-ended questions     Change Talk Expressed by the Patient: Taking steps    Provider Response to Change Talk: E - Evoked more info from patient about behavior change, A - Affirmed patient's thoughts, decisions, or attempts at behavior change, R - Reflected patient's change talk, and S - Summarized patient's change talk statements     Assessments completed prior to visit:2/23/2023    The following assessments were completed by patient for this visit:  PHQ2:       4/17/2025     8:20 AM 3/13/2025     1:02 PM 3/10/2025     7:26 AM 1/23/2025     3:02 PM 1/13/2025     8:46 AM 10/14/2024    12:47 PM 4/18/2024    10:22 AM   PHQ-2 ( 1999 [employer])   Q1: Little interest or pleasure in doing things 0 0 0 0 0 0 0   Q2: Feeling down, depressed or hopeless 0 1 0 0 0 0 0   PHQ-2 Score 0 1 0 0 0 0 0     PHQ9:       12/9/2024     6:15 AM 1/14/2025     8:01 AM 3/10/2025     9:08 AM 5/1/2025     1:54 PM 6/9/2025     7:56 AM 6/23/2025     1:59 PM 7/13/2025    12:46 PM   PHQ-9 SCORE   PHQ-9 Total Score MyChart 4 (Minimal depression) 2 (Minimal depression) 4 (Minimal depression) 4 (Minimal depression) 4 (Minimal depression) 2 (Minimal depression) 1 (Minimal depression)   PHQ-9 Total Score 4  2  4  4  4  2  1        Patient-reported     GAD2:       5/14/2024     9:32 AM 8/22/2024     9:18 AM 9/16/2024     8:17 AM 11/4/2024    10:30 AM 2/22/2025     9:06 AM 3/10/2025     9:09 AM 4/30/2025    11:49 AM   ROCKY-2   Feeling nervous, anxious, or on edge 0 1 1 0 1 1 1   Not being able to stop or control worrying 0 0 0 0 0 0 0   ROCKY-2 Total Score 0 1 1 0  1  1  1        Patient-reported     GAD7:       2/19/2024     1:36 PM  3/25/2024     4:52 PM 6/12/2024     9:56 PM 8/27/2024     5:53 PM 11/18/2024     8:50 AM 5/2/2025     5:35 PM 7/14/2025     9:46 AM   ROCKY-7 SCORE   Total Score 8 (mild anxiety) 10 (moderate anxiety)        Total Score 8 10 3 2 2 8 7     CAGE-AID:       2/9/2023    11:52 AM 7/17/2023     9:10 AM   CAGE-AID Total Score   Total Score 0 0   Total Score MyChart 0 (A total score of 2 or greater is considered clinically significant)      PROMIS 10-Global Health (all questions and answers displayed):       5/14/2024     9:34 AM 8/22/2024     9:19 AM 9/16/2024     8:19 AM 11/4/2024    10:32 AM 2/22/2025     9:08 AM 3/10/2025     9:10 AM 4/30/2025    11:51 AM   PROMIS 10   In general, would you say your health is: Very good Good Good Good Good Good Good   In general, would you say your quality of life is: Very good Very good Very good Very good Very good Very good Very good   In general, how would you rate your physical health? Good Good Good Good Good Good Good   In general, how would you rate your mental health, including your mood and your ability to think? Very good Very good Very good Very good Very good Very good Good   In general, how would you rate your satisfaction with your social activities and relationships? Very good Very good Very good Very good Good Good Good   In general, please rate how well you carry out your usual social activities and roles Good Fair Good Good Good Good Good   To what extent are you able to carry out your everyday physical activities such as walking, climbing stairs, carrying groceries, or moving a chair? Moderately Moderately Moderately Moderately A little A little Moderately   In the past 7 days, how often have you been bothered by emotional problems such as feeling anxious, depressed, or irritable? Never Rarely Rarely Rarely Rarely Rarely Sometimes   In the past 7 days, how would you rate your fatigue on average? Moderate Moderate Moderate Moderate Moderate Moderate Moderate   In the  past 7 days, how would you rate your pain on average, where 0 means no pain, and 10 means worst imaginable pain? 5 5 5 4 5 5 5   In general, would you say your health is: 4 3 3 3 3 3 3   In general, would you say your quality of life is: 4 4 4 4 4 4 4   In general, how would you rate your physical health? 3 3 3 3 3 3 3   In general, how would you rate your mental health, including your mood and your ability to think? 4 4 4 4 4 4 3   In general, how would you rate your satisfaction with your social activities and relationships? 4 4 4 4 3 3 3   In general, please rate how well you carry out your usual social activities and roles. (This includes activities at home, at work and in your community, and responsibilities as a parent, child, spouse, employee, friend, etc.) 3 2 3 3 3 3 3   To what extent are you able to carry out your everyday physical activities such as walking, climbing stairs, carrying groceries, or moving a chair? 3 3 3 3 2 2 3   In the past 7 days, how often have you been bothered by emotional problems such as feeling anxious, depressed, or irritable? 1 2 2 2 2 2 3   In the past 7 days, how would you rate your fatigue on average? 3 3 3 3 3 3 3   In the past 7 days, how would you rate your pain on average, where 0 means no pain, and 10 means worst imaginable pain? 5 5 5 4 5 5 5   Global Mental Health Score 17 16 16 16 15  15  13    Global Physical Health Score 12 12 12 12 11  11  12    PROMIS TOTAL - SUBSCORES 29 28 28 28 26  26  25        Patient-reported     PROMIS 10-Global Health (only subscores and total score):       5/14/2024     9:34 AM 8/22/2024     9:19 AM 9/16/2024     8:19 AM 11/4/2024    10:32 AM 2/22/2025     9:08 AM 3/10/2025     9:10 AM 4/30/2025    11:51 AM   PROMIS-10 Scores Only   Global Mental Health Score 17 16 16 16 15  15  13    Global Physical Health Score 12 12 12 12 11  11  12    PROMIS TOTAL - SUBSCORES 29 28 28 28 26  26  25        Patient-reported     [place] Suicide Severity  Rating Scale (Short Version)      7/17/2023     9:09 AM 9/13/2023     6:03 AM 12/6/2023     1:21 AM 2/16/2024    12:38 PM 4/13/2024     9:13 AM 5/24/2024     6:44 PM 10/19/2024     8:27 PM   [place] Suicide Severity Rating (Short Version)   Over the past 2 weeks have you felt down, depressed, or hopeless?  yes no no      Over the past 2 weeks have you had thoughts of killing yourself?  no no no      Have you ever attempted to kill yourself?  no no no      Q1 Wished to be Dead (Past Month)     0-->no 0-->no 0-->no   Q2 Suicidal Thoughts (Past Month)     0-->no 0-->no 0-->no   Q6 Suicide Behavior (Lifetime)     0-->no 0-->no 0-->no   Level of Risk per Screen     no risks indicated  no risks indicated  no risks indicated    1. Wish to be Dead (Since Last Contact) N         2. Non-Specific Active Suicidal Thoughts (Since Last Contact) N         Actual Attempt (Since Last Contact) N         Has subject engaged in non-suicidal self-injurious behavior? (Since Last Contact) N         Interrupted Attempts (Since Last Contact) N         Aborted or Self-Interrupted Attempt (Since Last Contact) N         Preparatory Acts or Behavior (Since Last Contact) N         Suicide (Since Last Contact) N         Calculated C-SSRS Risk Score (Since Last Contact) No Risk Indicated             Data saved with a previous flowsheet row definition         ASSESSMENT: Current Emotional / Mental Status (status of significant symptoms):   Risk status (Self / Other harm or suicidal ideation)   Patient denies current fears or concerns for personal safety.   Patient denies current or recent suicidal ideation or behaviors.   Patient denies current or recent homicidal ideation or behaviors.   Patient denies current or recent self injurious behavior or ideation.   Patient denies other safety concerns.   Patient reports there has been no change in risk factors since their last session.     Patient reports there has been no change in protective factors  "since their last session.     Recommended that patient call 911 or go to the local ED should there be a change in any of these risk factors   Would call [phone] if any SI.     Appearance:   Appropriate    Eye Contact:   Good    Psychomotor Behavior: Normal    Attitude:   Cooperative    Orientation:   Person Place Time Situation   Speech    Rate / Production: Normal/ Responsive    Volume:  Normal    Mood:    Anxious  Depressed  Irritable    Affect:    Appropriate    Thought Content:  Clear    Thought Form:  Coherent  Logical    Insight:    Good      Medication Review:   No changes to current psychiatric medication(s)     Medication Compliance:   Yes     Changes in Health Issues:   None reported     Chemical Use Review:   Substance Use: Chemical use reviewed, no active concerns identified      Tobacco Use: No current tobacco use.      Diagnosis:  1. ROCKY (generalized anxiety disorder)      Collateral Reports Completed:   Not Applicable    PLAN: (Patient Tasks / Therapist Tasks / Other):  Patient will continue to keep her body temperature in check  Patient will read and practice the boundary to center via my chart  Patient will read the handout and watch the video on \"inner critic\"  Patient next visit is August 20th     GERMAN Farmer           ______________________________________________________________________________    Individual Treatment Plan    Patient's Name: Sherly Yeung  YOB: 1965    Date of Creation: 2/23/2023    Date Treatment Plan Last Reviewed/Revised: 5/02/202    DSM5 Diagnoses: 296.32 (F33.1) Major Depressive Disorder, Recurrent Episode, Moderate With anxious distress or 300.02 (F41.1) Generalized Anxiety Disorder  Grief reaction [F43.21]    Psychosocial / Contextual Factors: grief: father recently passed away. Family dynamic- relationship with son, mom. Feeling overwhelmed.    PROMIS (reviewed every 90 days): 25    Referral / Collaboration:    Referral to another " "professional/service is not indicated at this time..    Anticipated number of session for this episode of care: 9-12 sessions  Anticipation frequency of session: Biweekly  Anticipated Duration of each session: 38-52 minutes  Treatment plan will be reviewed in 90 days or when goals have been changed.     MeasurableTreatment Goal(s) related to diagnosis / functional impairment(s)    Goal 1: Patient will Accept the loss of beloved one and return to stable level of functioning as evidenced by a higher level of functioning as a result of acceptance.   Redevelop a supportive social system and improve interpersonal relationships and Express unresolved emotions regarding loss which brings anxiety and depression mood.      I will know I've met my goal when I have more energy and  I am able to celebrate good life with my father Vs the sadness of losing him.Redevelop a supportive social system and improve interpersonal relationships\"    Objective #A (Patient Action)    Patient will Identify negative self-talk and behaviors: challenge core beliefs, myths, and actions.  Status: Continued - Date(s):    5/02/2025    Intervention(s)  Therapist will teach emotional recognition/identification. Support in her in the process    Objective #B  Patient will identify at least 3 fears / thoughts that contribute to feeling anxious.  Status: Continued - Date(s):    1/14/2025    Intervention(s)  Therapist will teach thought challenging with CBT.    Objective #C  Patient will Identify negative self-talk and behaviors: challenge core beliefs, myths, and actions.  Status: Continued - Date(s):    5/02/2025    Intervention(s)  Therapist will provide space and time to process thoughts and feelings around current stressors. provide support and coping skills to help move on in life.    Patient has reviewed and agreed to the above plan.    GERMAN Farmer  May 2nd,2025    Answers for HPI/ROS submitted by the patient on 4/20/2023  If you " checked off any problems, how difficult have these problems made it for you to do your work, take care of things at home, or get along with other people?: Somewhat difficult  PHQ9 TOTAL SCORE: 5    Answers for HPI/ROS submitted by the patient on 4/26/2023  If you checked off any problems, how difficult have these problems made it for you to do your work, take care of things at home, or get along with other people?: Somewhat difficult  PHQ9 TOTAL SCORE: 3    Answers for HPI/ROS submitted by the patient on 5/4/2023  If you checked off any problems, how difficult have these problems made it for you to do your work, take care of things at home, or get along with other people?: Somewhat difficult  PHQ9 TOTAL SCORE: 4  Answers for HPI/ROS submitted by the patient on 7/17/2023  If you checked off any problems, how difficult have these problems made it for you to do your work, take care of things at home, or get along with other people?: Somewhat difficult  PHQ9 TOTAL SCORE: 5    Answers submitted by the patient for this visit:  Patient Health Questionnaire (Submitted on 7/30/2023)  If you checked off any problems, how difficult have these problems made it for you to do your work, take care of things at home, or get along with other people?: Somewhat difficult  PHQ9 TOTAL SCORE: 5  Answers submitted by the patient for this visit:  Patient Health Questionnaire (Submitted on 8/20/2023)  If you checked off any problems, how difficult have these problems made it for you to do your work, take care of things at home, or get along with other people?: Somewhat difficult  PHQ9 TOTAL SCORE: 5  Answers submitted by the patient for this visit:  Patient Health Questionnaire (Submitted on 9/18/2023)  If you checked off any problems, how difficult have these problems made it for you to do your work, take care of things at home, or get along with other people?: Somewhat difficult  PHQ9 TOTAL SCORE: 4  Answers submitted by the patient for  this visit:  Patient Health Questionnaire (Submitted on 10/2/2023)  If you checked off any problems, how difficult have these problems made it for you to do your work, take care of things at home, or get along with other people?: Somewhat difficult  PHQ9 TOTAL SCORE: 5    Answers submitted by the patient for this visit:  Patient Health Questionnaire (Submitted on 10/15/2023)  If you checked off any problems, how difficult have these problems made it for you to do your work, take care of things at home, or get along with other people?: Somewhat difficult  PHQ9 TOTAL SCORE: 6  ROCKY-7 (Submitted on 10/10/2023)  ROCKY 7 TOTAL SCORE: 5    Answers submitted by the patient for this visit:  Patient Health Questionnaire (Submitted on 11/16/2023)  If you checked off any problems, how difficult have these problems made it for you to do your work, take care of things at home, or get along with other people?: Somewhat difficult  PHQ9 TOTAL SCORE: 4    Answers submitted by the patient for this visit:  Patient Health Questionnaire (Submitted on 11/28/2023)  If you checked off any problems, how difficult have these problems made it for you to do your work, take care of things at home, or get along with other people?: Somewhat difficult  PHQ9 TOTAL SCORE: 5    Answers submitted by the patient for this visit:  Patient Health Questionnaire (Submitted on 12/13/2023)  If you checked off any problems, how difficult have these problems made it for you to do your work, take care of things at home, or get along with other people?: Somewhat difficult  PHQ9 TOTAL SCORE: 5    Answers submitted by the patient for this visit:  Patient Health Questionnaire (Submitted on 12/26/2023)  If you checked off any problems, how difficult have these problems made it for you to do your work, take care of things at home, or get along with other people?: Somewhat difficult  PHQ9 TOTAL SCORE: 5    Answers submitted by the patient for this visit:  Patient Health  Questionnaire (Submitted on 1/10/2024)  If you checked off any problems, how difficult have these problems made it for you to do your work, take care of things at home, or get along with other people?: Somewhat difficult  PHQ9 TOTAL SCORE: 5  ROCKY-7 (Submitted on 1/7/2024)  ROCKY 7 TOTAL SCORE: 5    Answers submitted by the patient for this visit:  Patient Health Questionnaire (Submitted on 1/25/2024)  If you checked off any problems, how difficult have these problems made it for you to do your work, take care of things at home, or get along with other people?: Very difficult  PHQ9 TOTAL SCORE: 8  ROCKY-7 (Submitted on 1/22/2024)  ROCKY 7 TOTAL SCORE: 6    Answers submitted by the patient for this visit:  Patient Health Questionnaire (Submitted on 2/21/2024)  If you checked off any problems, how difficult have these problems made it for you to do your work, take care of things at home, or get along with other people?: Very difficult  PHQ9 TOTAL SCORE: 16  ROCKY-7 (Submitted on 2/19/2024)  ROCKY 7 TOTAL SCORE: 8    Answers submitted by the patient for this visit:  Patient Health Questionnaire (Submitted on 4/22/2024)  If you checked off any problems, how difficult have these problems made it for you to do your work, take care of things at home, or get along with other people?: Somewhat difficult  PHQ9 TOTAL SCORE: 2    Answers submitted by the patient for this visit:  Patient Health Questionnaire (Submitted on 5/6/2024)  If you checked off any problems, how difficult have these problems made it for you to do your work, take care of things at home, or get along with other people?: Somewhat difficult  PHQ9 TOTAL SCORE: 1    Answers submitted by the patient for this visit:  Patient Health Questionnaire (Submitted on 5/21/2024)  If you checked off any problems, how difficult have these problems made it for you to do your work, take care of things at home, or get along with other people?: Not difficult at all  PHQ9 TOTAL SCORE:  1    Answers submitted by the patient for this visit:  Patient Health Questionnaire (Submitted on 6/11/2024)  If you checked off any problems, how difficult have these problems made it for you to do your work, take care of things at home, or get along with other people?: Somewhat difficult  PHQ9 TOTAL SCORE: 1    Answers submitted by the patient for this visit:  Patient Health Questionnaire (Submitted on 8/26/2024)  If you checked off any problems, how difficult have these problems made it for you to do your work, take care of things at home, or get along with other people?: Somewhat difficult  PHQ9 TOTAL SCORE: 3    Answers submitted by the patient for this visit:  Patient Health Questionnaire (Submitted on 9/16/2024)  If you checked off any problems, how difficult have these problems made it for you to do your work, take care of things at home, or get along with other people?: Somewhat difficult  PHQ9 TOTAL SCORE: 4    Answers submitted by the patient for this visit:  Patient Health Questionnaire (Submitted on 10/21/2024)  If you checked off any problems, how difficult have these problems made it for you to do your work, take care of things at home, or get along with other people?: Very difficult  PHQ9 TOTAL SCORE: 4    Answers submitted by the patient for this visit:  Patient Health Questionnaire (Submitted on 11/17/2024)  If you checked off any problems, how difficult have these problems made it for you to do your work, take care of things at home, or get along with other people?: Somewhat difficult  PHQ9 TOTAL SCORE: 4    Answers submitted by the patient for this visit:  Patient Health Questionnaire (Submitted on 12/9/2024)  If you checked off any problems, how difficult have these problems made it for you to do your work, take care of things at home, or get along with other people?: Somewhat difficult  PHQ9 TOTAL SCORE: 4    Answers submitted by the patient for this visit:  Patient Health Questionnaire  (Submitted on 1/14/2025)  If you checked off any problems, how difficult have these problems made it for you to do your work, take care of things at home, or get along with other people?: Somewhat difficult  PHQ9 TOTAL SCORE: 2    Answers submitted by the patient for this visit:  Patient Health Questionnaire (Submitted on 5/1/2025)  If you checked off any problems, how difficult have these problems made it for you to do your work, take care of things at home, or get along with other people?: Somewhat difficult  PHQ9 TOTAL SCORE: 4    Answers submitted by the patient for this visit:  Patient Health Questionnaire (Submitted on 6/9/2025)  If you checked off any problems, how difficult have these problems made it for you to do your work, take care of things at home, or get along with other people?: Somewhat difficult  PHQ9 TOTAL SCORE: 4    Answers submitted by the patient for this visit:  Patient Health Questionnaire (Submitted on 7/13/2025)  If you checked off any problems, how difficult have these problems made it for you to do your work, take care of things at home, or get along with other people?: Somewhat difficult  PHQ9 TOTAL SCORE: 1

## (undated) DEVICE — SET EXTENSION WITH 2 PORTS (48EA/CA) ***PART #2C8610 IS A SUBSTITUTE*****

## (undated) DEVICE — KIT ANESTHESIA W/CIRCUIT & 3/LT BAG W/FILTER (20EA/CA)

## (undated) DEVICE — DRESSING POST OP BORDER 4 X 10 (5EA/BX)

## (undated) DEVICE — GLOVE BIOGEL SZ 7.5 SURGICAL PF LTX - (50PR/BX 4BX/CA)

## (undated) DEVICE — GLOVE BIOGEL INDICATOR SZ 8 SURGICAL PF LTX - (50/BX 4BX/CA)

## (undated) DEVICE — GOWN WARMING STANDARD FLEX - (30/CA)

## (undated) DEVICE — ELECTRODE 850 FOAM ADHESIVE - HYDROGEL RADIOTRNSPRNT (50/PK)

## (undated) DEVICE — SUTURE 1 VICRYL PLUS CTX - 8 X 18 INCH (12/BX)

## (undated) DEVICE — BLADE SAGITTAL SAW DUAL CUT 75.0 X 25.0MM (1/EA)

## (undated) DEVICE — PROTECTOR ULNA NERVE - (36PR/CA)

## (undated) DEVICE — MASK ANESTHESIA ADULT  - (100/CA)

## (undated) DEVICE — STAPLER SKIN DISP - (6/BX 10BX/CA) VISISTAT

## (undated) DEVICE — LACTATED RINGERS INJ 1000 ML - (14EA/CA 60CA/PF)

## (undated) DEVICE — ELECTRODE DUAL RETURN W/ CORD - (50/PK)

## (undated) DEVICE — FILM CASSETTE ENDO

## (undated) DEVICE — SODIUM CHL IRRIGATION 0.9% 1000ML (12EA/CA)

## (undated) DEVICE — HEAD HOLDER JUNIOR/ADULT

## (undated) DEVICE — MANIFOLD NEPTUNE 1 PORT (20/PK)

## (undated) DEVICE — KIT CUSTOM PROCEDURE SINGLE FOR ENDO  (15/CA)

## (undated) DEVICE — CONNECTOR 5-IN-1 STERILE - (25EA/BX)

## (undated) DEVICE — SET LEADWIRE 5 LEAD BEDSIDE DISPOSABLE ECG (1SET OF 5/EA)

## (undated) DEVICE — SUCTION INSTRUMENT YANKAUER BULBOUS TIP W/O VENT (50EA/CA)

## (undated) DEVICE — CHLORAPREP 26 ML APPLICATOR - ORANGE TINT(25/CA)

## (undated) DEVICE — SUTURE 5 TI-CRON HOS-14 - (36/BX)

## (undated) DEVICE — GLOVE BIOGEL PI ORTHO SZ 8 PF LF (40PR/BX)

## (undated) DEVICE — TUBE CONNECTING SUCTION - CLEAR PLASTIC STERILE 72 IN (50EA/CA)

## (undated) DEVICE — DRAPE STRLE REG TOWEL 18X24 - (10/BX 4BX/CA)"

## (undated) DEVICE — CANNULA O2 COMFORT SOFT EAR ADULT 7 FT TUBING (50/CA)

## (undated) DEVICE — SUTURE 2-0 VICRYL PLUS CT-1 - 8 X 18 INCH(12/BX)

## (undated) DEVICE — NEPTUNE 4 PORT MANIFOLD - (20/PK)

## (undated) DEVICE — WATER IRRIGATION STERILE 1000ML (12EA/CA)

## (undated) DEVICE — SLEEVE, VASO, THIGH, MED

## (undated) DEVICE — MIXER BONE CEMENT REVOLUTION - W/FEMORAL PRESSURIZER (6/CA)

## (undated) DEVICE — GLOVE SZ 8 BIOGEL PI MICRO - PF LF (50PR/BX)

## (undated) DEVICE — KIT HIP PREP IM ENCHANCE TOTAL (5EA/BX)

## (undated) DEVICE — PACK TOTAL HIP - (1/CA)

## (undated) DEVICE — SENSOR SPO2 NEO LNCS ADHESIVE (20/BX) SEE USER NOTES

## (undated) DEVICE — CANISTER SUCTION 3000ML MECHANICAL FILTER AUTO SHUTOFF MEDI-VAC NONSTERILE LF DISP  (40EA/CA)

## (undated) DEVICE — SUTURE GENERAL

## (undated) DEVICE — TOWEL STOP TIMEOUT SAFETY FLAG (40EA/CA)

## (undated) DEVICE — CANISTER SUCTION RIGID RED 1500CC (40EA/CA)

## (undated) DEVICE — BITE BLOCK ADULT 60FR (100EA/CA)

## (undated) DEVICE — TUBING CLEARLINK DUO-VENT - C-FLO (48EA/CA)